# Patient Record
Sex: FEMALE | Race: WHITE | NOT HISPANIC OR LATINO | Employment: OTHER | URBAN - METROPOLITAN AREA
[De-identification: names, ages, dates, MRNs, and addresses within clinical notes are randomized per-mention and may not be internally consistent; named-entity substitution may affect disease eponyms.]

---

## 2017-03-08 ENCOUNTER — PATIENT MESSAGE (OUTPATIENT)
Dept: OBSTETRICS AND GYNECOLOGY | Facility: CLINIC | Age: 57
End: 2017-03-08

## 2017-03-08 ENCOUNTER — TELEPHONE (OUTPATIENT)
Dept: OBSTETRICS AND GYNECOLOGY | Facility: CLINIC | Age: 57
End: 2017-03-08

## 2017-03-08 DIAGNOSIS — Z12.31 VISIT FOR SCREENING MAMMOGRAM: Primary | ICD-10-CM

## 2017-03-08 NOTE — TELEPHONE ENCOUNTER
Orders placed! Per pt request    Hi Dr. Riggs!  I have an appointment for my annual physical on March 17, 2017.  I got a letter that I need a referral for a Mammogram.  Can you please provide a referral?     Thanks!   Mariah Rice

## 2017-03-17 ENCOUNTER — OFFICE VISIT (OUTPATIENT)
Dept: OBSTETRICS AND GYNECOLOGY | Facility: CLINIC | Age: 57
End: 2017-03-17
Payer: COMMERCIAL

## 2017-03-17 VITALS
SYSTOLIC BLOOD PRESSURE: 102 MMHG | HEIGHT: 64 IN | WEIGHT: 150.13 LBS | BODY MASS INDEX: 25.63 KG/M2 | DIASTOLIC BLOOD PRESSURE: 64 MMHG

## 2017-03-17 DIAGNOSIS — Z01.419 ENCOUNTER FOR GYNECOLOGICAL EXAMINATION WITHOUT ABNORMAL FINDING: Primary | ICD-10-CM

## 2017-03-17 DIAGNOSIS — R10.2 PELVIC CRAMPING: ICD-10-CM

## 2017-03-17 DIAGNOSIS — N95.1 PERIMENOPAUSAL: ICD-10-CM

## 2017-03-17 LAB
BILIRUB UR QL STRIP: NEGATIVE
CLARITY UR REFRACT.AUTO: CLEAR
COLOR UR AUTO: YELLOW
GLUCOSE UR QL STRIP: NEGATIVE
HGB UR QL STRIP: NEGATIVE
KETONES UR QL STRIP: NEGATIVE
LEUKOCYTE ESTERASE UR QL STRIP: NEGATIVE
MICROSCOPIC COMMENT: NORMAL
NITRITE UR QL STRIP: NEGATIVE
PH UR STRIP: 5 [PH] (ref 5–8)
PROT UR QL STRIP: NEGATIVE
RBC #/AREA URNS AUTO: 0 /HPF (ref 0–4)
SP GR UR STRIP: 1.03 (ref 1–1.03)
SQUAMOUS #/AREA URNS AUTO: 0 /HPF
URN SPEC COLLECT METH UR: NORMAL
UROBILINOGEN UR STRIP-ACNC: NEGATIVE EU/DL
WBC #/AREA URNS AUTO: 0 /HPF (ref 0–5)

## 2017-03-17 PROCEDURE — 99999 PR PBB SHADOW E&M-EST. PATIENT-LVL III: CPT | Mod: PBBFAC,,, | Performed by: OBSTETRICS & GYNECOLOGY

## 2017-03-17 PROCEDURE — 87086 URINE CULTURE/COLONY COUNT: CPT

## 2017-03-17 PROCEDURE — 99396 PREV VISIT EST AGE 40-64: CPT | Mod: S$GLB,,, | Performed by: OBSTETRICS & GYNECOLOGY

## 2017-03-17 PROCEDURE — 81001 URINALYSIS AUTO W/SCOPE: CPT

## 2017-03-17 NOTE — MR AVS SNAPSHOT
[facility] - OB/GYN 5th Floor  [address]  [facility] [address]  Phone: [phone]                  Marilee Colmenares   3/17/2017 2:15 PM   Office Visit    Description:  Female : 1960   Provider:  Roseline Riggs MD   Department:  [facility] - OB/GYN 5th Floor           Reason for Visit     Well Woman                To Do List           Future Appointments        Provider Department Dept Phone    3/20/2017 9:40 AM [facility] [id number] SCREEN INT MED [facility] [phone]      Goals (5 Years of Data)     None      [facility]     [facility] Care [facility] -  Assistance  Registered nurses in the [facility] provide clinical advisement, health education, appointment booking, and other advisory services.  Call for this free service at [phone].             Medications           Message regarding Medications     Verify the changes and/or additions to your medication regime listed below are the same as discussed with your clinician today.  If any of these changes or additions are incorrect, please notify your healthcare provider.             Verify that the below list of medications is an accurate representation of the medications you are currently taking.  If none reported, the list may be blank. If incorrect, please contact your healthcare provider. Carry this list with you in case of emergency.           Current Medications     albuterol 90 mcg/actuation inhaler Inhale 2 puffs into the lungs every 6 (six) hours as needed for Wheezing.    aspirin (ECOTRIN) 325 MG EC tablet Take 1 tablet (325 mg total) by mouth once daily.    atorvastatin (LIPITOR) 20 MG tablet TAKE 1 TABLET (20 MG TOTAL) BY MOUTH ONCE DAILY.    desloratadine (CLARINEX) 5 mg tablet TAKE 1 TABLET (5 MG TOTAL) BY MOUTH ONCE DAILY.    FLUARIX QUAD 7191-7052, PF, 60 mcg (15 mcg x 4)/0.5 mL Syrg TO BE ADMINISTERED BY PHARMACIST FOR IMMUNIZATION    guaifenesin (MUCINEX) 600 mg 12 hr tablet  "Take 1,200 mg by mouth 2 (two) times daily.    montelukast (SINGULAIR) 10 mg tablet TAKE 1 TABLET (10 MG TOTAL) BY MOUTH EVERY EVENING.    ranitidine (ZANTAC) 150 MG capsule Take 150 mg by mouth 2 (two) times daily.             Clinical Reference Information           Your Vitals Were     BP Height Weight Last Period BMI    102/64 5' 4" (1.626 m) 68.1 kg (150 lb 2.1 oz) 04/21/2012 25.77 kg/m2      Blood Pressure          Most Recent Value    BP  102/64      Allergies as of 3/17/2017     Tetracyclines    Ceclor [Cefaclor]    Keflex [Cephalexin]      Immunizations Administered on Date of Encounter - 3/17/2017     None      Language Assistance Services     ATTENTION: Language assistance services are available, free of charge. Please call [phone].      ATENCIÓN: Si jacqueline art, tiene a palomares disposición servicios gratuitos de asistencia lingüística. Llame al [phone].     DONNIE Ý: N?u b?n nói Ti?ng Vi?t, có các d?ch v? h? tr? ngôn ng? mi?n phí dành cho b?n. G?i s? [phone].         [facility] - OB[facility]N 5th Floor complies with applicable Federal civil rights laws and does not discriminate on the basis of race, color, national origin, age, disability, or sex.        "

## 2017-03-17 NOTE — PROGRESS NOTES
CC: Well woman exam    Marilee Colmenares is a 56 y.o. female  presents for a well woman exam.  LMP: Patient's last menstrual period was 2012..    She had an episode where her lower pelvic area was cramping as if she was going to have a cycle.  Has a cycle about once a year. NO other   GYN concerns    Past Medical History:   Diagnosis Date    Abnormal Pap smear     Asthma     DJD (degenerative joint disease) of knee     GERD (gastroesophageal reflux disease)     Stroke      Past Surgical History:   Procedure Laterality Date    BREAST SURGERY   and     right side and left side/begin fibroid tumor    CERVIX SURGERY      colpo 2002      skin cancers      TONSILLECTOMY      childhood    TONSILLECTOMY, ADENOIDECTOMY      childhood     Social History     Social History    Marital status:      Spouse name: N/A    Number of children: N/A    Years of education: N/A     Occupational History          Social History Main Topics    Smoking status: Former Smoker    Smokeless tobacco: Never Used      Comment: quit >28 years ago    Alcohol use 6.0 oz/week     10 Glasses of wine per week      Comment: has at least one drink daily/10-12 per week    Drug use: No    Sexual activity: Yes     Partners: Male     Birth control/ protection: Post-menopausal     Other Topics Concern    None     Social History Narrative    Retired from     Now working on [employer]         Family History   Problem Relation Age of Onset    Stroke Mother     Stroke Father     Diabetes Maternal Aunt     Cancer Maternal Aunt      thyroid cancer    Melanoma Maternal Aunt     Heart disease Maternal Uncle     Heart disease Paternal Uncle     Cancer Paternal Uncle      prostate cancer    Heart disease Maternal Grandmother     Diabetes Maternal Grandmother     Heart disease Maternal Grandfather     Heart disease Paternal Grandmother     Heart disease  "Paternal Grandfather     Cancer Paternal Grandfather      bone cancer    Cancer Paternal Aunt      stomach cancer    Breast cancer Neg Hx     Colon cancer Neg Hx     Ovarian cancer Neg Hx      OB History      Para Term  AB TAB SAB Ectopic Multiple Living    1 1                  /64  Ht 5' 4" (1.626 m)  Wt 68.1 kg (150 lb 2.1 oz)  LMP 2012  BMI 25.77 kg/m2      ROS:    ROS:  GENERAL: Denies weight gain or weight loss. Feeling well overall.   SKIN: Denies rash or lesions.   HEAD: Denies head injury or headache.   NODES: Denies enlarged lymph nodes.   CHEST: Denies chest pain or shortness of breath.   CARDIOVASCULAR: Denies palpitations or left sided chest pain.   ABDOMEN: No abdominal pain, constipation, diarrhea, nausea, vomiting or rectal bleeding.   URINARY: No frequency, dysuria, hematuria, or burning on urination.  REPRODUCTIVE: See HPI.   BREASTS: The patient performs breast self-examination and denies pain, lumps, or nipple discharge.   HEMATOLOGIC: No easy bruisability or excessive bleeding.   MUSCULOSKELETAL: Denies joint pain or swelling.   NEUROLOGIC: Denies syncope or weakness.   PSYCHIATRIC: Denies depression, anxiety or mood swings.    PHYSICAL EXAM:    APPEARANCE: Well nourished, well developed, in no acute distress.  AFFECT: WNL, alert and oriented x 3  SKIN: No acne or hirsutism  NECK: Neck symmetric without masses or thyromegaly  NODES: No inguinal, cervical, axillary, or femoral lymph node enlargement  CHEST: Good respiratory effect  ABDOMEN: Soft.  No tenderness or masses.  No hepatosplenomegaly.  No hernias.  BREASTS: Symmetrical, no skin changes or visible lesions.  No palpable masses, nipple discharge bilaterally.  PELVIC: Normal external genitalia without lesions.  Normal hair distribution.  Adequate perineal body, normal urethral meatus.  Vagina moist and well rugated without lesions or discharge.  Cervix pink, without lesions, discharge or tenderness.  No " significant cystocele or rectocele.  Bimanual exam shows uterus to be normal size, regular, mobile and nontender.  Adnexa without masses or tenderness.    RECTAL: Rectovaginal exam confirms above with normal sphincter tone, no masses.  EXTREMITIES: No edema.      ICD-10-CM ICD-9-CM    1. Encounter for gynecological examination without abnormal finding Z01.419 V72.31    2. Pelvic cramping R10.2 625.9 Urine culture      Urinalysis   3. Perimenopausal N95.1 627.2          Patient was counseled today on A.C.S. Pap guidelines and recommendations for yearly pelvic exams, mammograms and monthly self breast exams; to see her PCP for other health maintenance.     F/U with me if she has another episode of pelvic pain- CONSIDER pelvic US.   Precautions given

## 2017-03-19 LAB — BACTERIA UR CULT: NO GROWTH

## 2017-03-20 ENCOUNTER — HOSPITAL ENCOUNTER (OUTPATIENT)
Dept: RADIOLOGY | Facility: HOSPITAL | Age: 57
Discharge: HOME | End: 2017-03-20
Attending: OBSTETRICS & GYNECOLOGY
Payer: COMMERCIAL

## 2017-03-20 DIAGNOSIS — Z12.31 VISIT FOR SCREENING MAMMOGRAM: ICD-10-CM

## 2017-03-20 PROCEDURE — 77067 SCR MAMMO BI INCL CAD: CPT | Mod: 26,,, | Performed by: RADIOLOGY

## 2017-03-20 PROCEDURE — 77067 SCR MAMMO BI INCL CAD: CPT | Mod: TC

## 2017-08-10 RX ORDER — DESLORATADINE 5 MG/1
TABLET ORAL
Qty: 90 TABLET | Refills: 2 | Status: SHIPPED | OUTPATIENT
Start: 2017-08-10 | End: 2018-05-29 | Stop reason: SDUPTHER

## 2017-09-07 RX ORDER — ATORVASTATIN CALCIUM 20 MG/1
TABLET, FILM COATED ORAL
Qty: 90 TABLET | Refills: 3 | OUTPATIENT
Start: 2017-09-07

## 2017-09-12 ENCOUNTER — OFFICE VISIT (OUTPATIENT)
Dept: INTERNAL MEDICINE | Facility: CLINIC | Age: 57
End: 2017-09-12
Payer: COMMERCIAL

## 2017-09-12 VITALS
WEIGHT: 153.88 LBS | BODY MASS INDEX: 26.27 KG/M2 | HEIGHT: 64 IN | RESPIRATION RATE: 16 BRPM | DIASTOLIC BLOOD PRESSURE: 53 MMHG | SYSTOLIC BLOOD PRESSURE: 98 MMHG | HEART RATE: 69 BPM | TEMPERATURE: 98 F

## 2017-09-12 DIAGNOSIS — G47.62 NOCTURNAL LEG CRAMPS: ICD-10-CM

## 2017-09-12 DIAGNOSIS — Z00.00 ANNUAL PHYSICAL EXAM: Primary | ICD-10-CM

## 2017-09-12 DIAGNOSIS — E01.0 THYROMEGALY: ICD-10-CM

## 2017-09-12 DIAGNOSIS — E78.5 DYSLIPIDEMIA: ICD-10-CM

## 2017-09-12 DIAGNOSIS — I63.9 COMPLETED STROKE: ICD-10-CM

## 2017-09-12 PROCEDURE — 99396 PREV VISIT EST AGE 40-64: CPT | Mod: S$GLB,,, | Performed by: INTERNAL MEDICINE

## 2017-09-12 PROCEDURE — 99999 PR PBB SHADOW E&M-EST. PATIENT-LVL III: CPT | Mod: PBBFAC,,, | Performed by: INTERNAL MEDICINE

## 2017-09-12 RX ORDER — ATORVASTATIN CALCIUM 20 MG/1
20 TABLET, FILM COATED ORAL DAILY
Qty: 90 TABLET | Refills: 3 | Status: SHIPPED | OUTPATIENT
Start: 2017-09-12 | End: 2018-08-30 | Stop reason: SDUPTHER

## 2017-09-14 ENCOUNTER — LAB VISIT (OUTPATIENT)
Dept: LAB | Facility: HOSPITAL | Age: 57
End: 2017-09-14
Attending: INTERNAL MEDICINE
Payer: COMMERCIAL

## 2017-09-14 DIAGNOSIS — E78.5 DYSLIPIDEMIA: ICD-10-CM

## 2017-09-14 DIAGNOSIS — I63.9 COMPLETED STROKE: ICD-10-CM

## 2017-09-14 DIAGNOSIS — Z00.00 ANNUAL PHYSICAL EXAM: ICD-10-CM

## 2017-09-14 DIAGNOSIS — E01.0 THYROMEGALY: ICD-10-CM

## 2017-09-14 LAB
ALBUMIN SERPL BCP-MCNC: 3.9 G/DL
ALP SERPL-CCNC: 72 U/L
ALT SERPL W/O P-5'-P-CCNC: 21 U/L
ANION GAP SERPL CALC-SCNC: 10 MMOL/L
AST SERPL-CCNC: 28 U/L
BASOPHILS # BLD AUTO: 0.05 K/UL
BASOPHILS NFR BLD: 1.1 %
BILIRUB SERPL-MCNC: 0.8 MG/DL
BUN SERPL-MCNC: 15 MG/DL
CALCIUM SERPL-MCNC: 9.4 MG/DL
CHLORIDE SERPL-SCNC: 106 MMOL/L
CHOLEST SERPL-MCNC: 169 MG/DL
CHOLEST/HDLC SERPL: 2.2 {RATIO}
CK SERPL-CCNC: 85 U/L
CO2 SERPL-SCNC: 25 MMOL/L
CREAT SERPL-MCNC: 0.8 MG/DL
DIFFERENTIAL METHOD: ABNORMAL
EOSINOPHIL # BLD AUTO: 0.3 K/UL
EOSINOPHIL NFR BLD: 6.8 %
ERYTHROCYTE [DISTWIDTH] IN BLOOD BY AUTOMATED COUNT: 13.1 %
EST. GFR  (AFRICAN AMERICAN): >60 ML/MIN/1.73 M^2
EST. GFR  (NON AFRICAN AMERICAN): >60 ML/MIN/1.73 M^2
GLUCOSE SERPL-MCNC: 94 MG/DL
HCT VFR BLD AUTO: 45.3 %
HDLC SERPL-MCNC: 77 MG/DL
HDLC SERPL: 45.6 %
HGB BLD-MCNC: 15.1 G/DL
LDLC SERPL CALC-MCNC: 79.4 MG/DL
LYMPHOCYTES # BLD AUTO: 1.8 K/UL
LYMPHOCYTES NFR BLD: 40.4 %
MCH RBC QN AUTO: 32.4 PG
MCHC RBC AUTO-ENTMCNC: 33.3 G/DL
MCV RBC AUTO: 97 FL
MONOCYTES # BLD AUTO: 0.4 K/UL
MONOCYTES NFR BLD: 10 %
NEUTROPHILS # BLD AUTO: 1.8 K/UL
NEUTROPHILS NFR BLD: 41.5 %
NONHDLC SERPL-MCNC: 92 MG/DL
PLATELET # BLD AUTO: 229 K/UL
PMV BLD AUTO: 10.2 FL
POTASSIUM SERPL-SCNC: 4.4 MMOL/L
PROT SERPL-MCNC: 6.9 G/DL
RBC # BLD AUTO: 4.66 M/UL
SODIUM SERPL-SCNC: 141 MMOL/L
T4 FREE SERPL-MCNC: 0.82 NG/DL
TRIGL SERPL-MCNC: 63 MG/DL
TSH SERPL DL<=0.005 MIU/L-ACNC: 4.38 UIU/ML
WBC # BLD AUTO: 4.38 K/UL

## 2017-09-14 PROCEDURE — 82550 ASSAY OF CK (CPK): CPT

## 2017-09-14 PROCEDURE — 36415 COLL VENOUS BLD VENIPUNCTURE: CPT | Mod: PO

## 2017-09-14 PROCEDURE — 80061 LIPID PANEL: CPT

## 2017-09-14 PROCEDURE — 80053 COMPREHEN METABOLIC PANEL: CPT

## 2017-09-14 PROCEDURE — 84439 ASSAY OF FREE THYROXINE: CPT

## 2017-09-14 PROCEDURE — 84443 ASSAY THYROID STIM HORMONE: CPT

## 2017-09-14 PROCEDURE — 85025 COMPLETE CBC W/AUTO DIFF WBC: CPT

## 2017-09-18 NOTE — PROGRESS NOTES
Subjective:       Patient ID: Marilee Colmenares is a 57 y.o. female.    Chief Complaint: Annual Exam  HISTORY OF PRESENT ILLNESS:  A 57-year-old white female patient who had seen Dr. Parter but she is currently left the clinic.  She is coming in for an annual   review.  The patient had lab work done at the time of this exam, urinalysis was   normal.  Thyroid function normal.  CBC normal, lipids normal, chemistries   normal.  Her TSH is 4.38.  CPK is normal.  T4 is normal.  The patient sees Dr. Dexter for allergies,,  gynecologist per .  She sees skin doctor Dr. Parikh and   used to see Dr. Vizcaino for Neurology, but is no longer seeing those doctors   since they have left.  She had a stroke involving the right hand in 2015.  She   does have a family history of thyroid disease in her aunt who had cancer, and    sister who had hyperthyroidism.  The only complaint she has is left leg cramps,   mostly at night.  She does not do much in the way of physical activity.  The   patient has no other complaints.    PHYSICAL EXAMINATION:  VITAL SIGNS:  Normal.  SKIN:  Fair and healthy.  HEENT:  Clear.  NECK:  Shows no stiffness, adenopathy or thyroid enlargement.  CHEST:  Clear to percussion and auscultation.  HEART:  There is no murmur or gallop.  ABDOMEN:  Nontender without any organomegaly.  EXTREMITIES:  Show normal muscles, joints and pulses.  She has no tenderness in   her legs currently.  She has no nodularity to the muscles.  She has good pulses.    No edema.  NEUROLOGIC:  Appears normal including relative to right hand function.    The patient did have an enlarged right side of her thyroid.  On the exam,   because of her cramps, I sent her for CPK and that was normal then she was sent   for ultrasound of her thyroid and that is pending.    IMPRESSION:  1.  Status post stroke in 2015 involving the right hand with good recovery.  2.  Right thyromegaly with an elevated TSH.  I have told although that likely   will put  her on a small dose of thyroid just based on her thyroid being   enlarged, but under these circumstances that she does not have any abnormality   on her scan, we will put her on a dose that point of 0.05.  3.  Leg cramps.  She is concerned that might be from her statin medication,   which is a 20 mg dose of Lipitor.  She wanted to know if we could reduce her   dose of Lipitor based on her cholesterol, which is 169 and I will likely tell   her that it is probably not advisable and that her leg cramps are not from the   medication most likely.  I have told her that already, telling her that mostly   it is with activity with the statins and instead to try magnesium tablet   over-the-counter at night, daily as needed for the cramping.  4.  History of nasal allergies.  I will be her PCP if she wishes.  Otherwise, we   will follow up on her thyroid and the discussion about her Lipitor in about   three weeks.      BREANN/BISI  dd: 09/17/2017 22:25:46 (CDT)  td: 09/18/2017 02:01:59 (CDT)  Doc ID   #[id number]  Job ID #[id number]    CC:     [facility] [id number]  [facility] [place]  Review of Systems   Constitutional: Negative.  Negative for activity change and unexpected weight change.   HENT: Positive for rhinorrhea. Negative for congestion, hearing loss, sinus pressure, sneezing, sore throat, trouble swallowing and voice change.    Eyes: Positive for discharge and visual disturbance.   Respiratory: Positive for wheezing. Negative for cough, chest tightness and shortness of breath.    Cardiovascular: Negative.  Negative for chest pain, palpitations and leg swelling.   Gastrointestinal: Negative.  Negative for blood in stool, constipation, diarrhea and vomiting.   Endocrine: Negative for polydipsia and polyuria.   Genitourinary: Negative for difficulty urinating, dysuria, flank pain, frequency, hematuria, menstrual problem, pelvic pain, urgency, vaginal bleeding and vaginal discharge.   Musculoskeletal: Positive for arthralgias and myalgias. Negative for joint  swelling, neck pain and neck stiffness.   Skin: Negative.    Neurological: Negative.  Negative for dizziness, seizures, weakness, light-headedness, numbness and headaches.   Psychiatric/Behavioral: Negative for agitation, behavioral problems, confusion, dysphoric mood and sleep disturbance. The patient is not nervous/anxious.        Objective:      Physical Exam   Constitutional: She is oriented to person, place, and time. She appears well-developed and well-nourished.   Eyes: EOM are normal. Pupils are equal, round, and reactive to light.   Neck: Normal range of motion. Neck supple. No JVD present. Thyromegaly present.   Cardiovascular: Normal rate, regular rhythm, normal heart sounds and intact distal pulses.  Exam reveals no gallop.    No murmur heard.  Pulmonary/Chest: Breath sounds normal. She has no wheezes. She has no rales.   Abdominal: Soft. Bowel sounds are normal. She exhibits no mass. There is no tenderness.   Musculoskeletal: Normal range of motion.   Lymphadenopathy:     She has no cervical adenopathy.   Neurological: She is alert and oriented to person, place, and time. She has normal reflexes. No cranial nerve deficit.   Skin: No rash noted. No erythema.   Psychiatric: She has a normal mood and affect. Judgment normal.       Assessment:       1. Annual physical exam    2. Completed stroke    3. Dyslipidemia    4. Thyromegaly    5. Nocturnal leg cramps        Plan:

## 2017-09-19 ENCOUNTER — HOSPITAL ENCOUNTER (OUTPATIENT)
Dept: RADIOLOGY | Facility: HOSPITAL | Age: 57
Discharge: HOME | End: 2017-09-19
Attending: INTERNAL MEDICINE
Payer: COMMERCIAL

## 2017-09-19 DIAGNOSIS — E01.0 THYROMEGALY: ICD-10-CM

## 2017-09-19 PROCEDURE — 76536 US EXAM OF HEAD AND NECK: CPT | Mod: TC

## 2017-09-19 PROCEDURE — 76536 US EXAM OF HEAD AND NECK: CPT | Mod: 26,,, | Performed by: RADIOLOGY

## 2017-09-25 ENCOUNTER — HOSPITAL ENCOUNTER (EMERGENCY)
Facility: HOSPITAL | Age: 57
Discharge: HOME | End: 2017-09-25
Attending: EMERGENCY MEDICINE | Admitting: ORTHOPAEDIC SURGERY
Payer: COMMERCIAL

## 2017-09-25 VITALS
RESPIRATION RATE: 20 BRPM | OXYGEN SATURATION: 98 % | DIASTOLIC BLOOD PRESSURE: 73 MMHG | BODY MASS INDEX: 25.61 KG/M2 | HEIGHT: 64 IN | TEMPERATURE: 98 F | WEIGHT: 150 LBS | HEART RATE: 67 BPM | SYSTOLIC BLOOD PRESSURE: 140 MMHG
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
## 2017-09-25 DIAGNOSIS — S52.532A CLOSED COLLES' FRACTURE OF LEFT RADIUS, INITIAL ENCOUNTER: Primary | ICD-10-CM
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
## 2017-09-25 DIAGNOSIS — T14.8XXA FRACTURE: ICD-10-CM
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
## 2017-09-25 DIAGNOSIS — S52.592A OTHER CLOSED FRACTURE OF DISTAL END OF LEFT RADIUS, INITIAL ENCOUNTER: ICD-10-CM

## 2017-09-25 LAB
ANION GAP SERPL CALC-SCNC: 10 MMOL/L
BASOPHILS # BLD AUTO: 0.04 K/UL
BASOPHILS NFR BLD: 0.5 %
BUN SERPL-MCNC: 20 MG/DL
CALCIUM SERPL-MCNC: 10 MG/DL
CHLORIDE SERPL-SCNC: 107 MMOL/L
CO2 SERPL-SCNC: 24 MMOL/L
CREAT SERPL-MCNC: 0.8 MG/DL
DIFFERENTIAL METHOD: ABNORMAL
EOSINOPHIL # BLD AUTO: 0.2 K/UL
EOSINOPHIL NFR BLD: 2.9 %
ERYTHROCYTE [DISTWIDTH] IN BLOOD BY AUTOMATED COUNT: 13.4 %
EST. GFR  (AFRICAN AMERICAN): >60 ML/MIN/1.73 M^2
EST. GFR  (NON AFRICAN AMERICAN): >60 ML/MIN/1.73 M^2
GLUCOSE SERPL-MCNC: 120 MG/DL
HCT VFR BLD AUTO: 44.4 %
HGB BLD-MCNC: 15.5 G/DL
INR PPP: 1
LYMPHOCYTES # BLD AUTO: 1.1 K/UL
LYMPHOCYTES NFR BLD: 15.4 %
MCH RBC QN AUTO: 32.4 PG
MCHC RBC AUTO-ENTMCNC: 34.9 G/DL
MCV RBC AUTO: 93 FL
MONOCYTES # BLD AUTO: 0.5 K/UL
MONOCYTES NFR BLD: 6.6 %
NEUTROPHILS # BLD AUTO: 5.4 K/UL
NEUTROPHILS NFR BLD: 74.5 %
PLATELET # BLD AUTO: 219 K/UL
PMV BLD AUTO: 10.1 FL
POTASSIUM SERPL-SCNC: 4.2 MMOL/L
PROTHROMBIN TIME: 11 SEC
RBC # BLD AUTO: 4.78 M/UL
SODIUM SERPL-SCNC: 141 MMOL/L
WBC # BLD AUTO: 7.28 K/UL

## 2017-09-25 PROCEDURE — 63600175 PHARM REV CODE 636 W HCPCS: Performed by: EMERGENCY MEDICINE

## 2017-09-25 PROCEDURE — 96375 TX/PRO/DX INJ NEW DRUG ADDON: CPT

## 2017-09-25 PROCEDURE — 93005 ELECTROCARDIOGRAM TRACING: CPT

## 2017-09-25 PROCEDURE — 85025 COMPLETE CBC W/AUTO DIFF WBC: CPT

## 2017-09-25 PROCEDURE — 99285 EMERGENCY DEPT VISIT HI MDM: CPT | Mod: 25

## 2017-09-25 PROCEDURE — 63600175 PHARM REV CODE 636 W HCPCS: Performed by: ORTHOPAEDIC SURGERY

## 2017-09-25 PROCEDURE — 93010 ELECTROCARDIOGRAM REPORT: CPT | Mod: ,,, | Performed by: INTERNAL MEDICINE

## 2017-09-25 PROCEDURE — 25605 CLTX DST RDL FX/EPHYS SEP W/: CPT | Mod: LT

## 2017-09-25 PROCEDURE — 96374 THER/PROPH/DIAG INJ IV PUSH: CPT

## 2017-09-25 PROCEDURE — 99285 EMERGENCY DEPT VISIT HI MDM: CPT | Mod: ,,, | Performed by: EMERGENCY MEDICINE

## 2017-09-25 PROCEDURE — 85610 PROTHROMBIN TIME: CPT

## 2017-09-25 PROCEDURE — 25000003 PHARM REV CODE 250: Performed by: ORTHOPAEDIC SURGERY

## 2017-09-25 PROCEDURE — 80048 BASIC METABOLIC PNL TOTAL CA: CPT

## 2017-09-25 RX ORDER — MORPHINE SULFATE 4 MG/ML
4 INJECTION, SOLUTION INTRAMUSCULAR; INTRAVENOUS
Status: COMPLETED | OUTPATIENT
Start: 2017-09-25 | End: 2017-09-25

## 2017-09-25 RX ORDER — LIDOCAINE HYDROCHLORIDE 10 MG/ML
20 INJECTION INFILTRATION; PERINEURAL ONCE
Status: COMPLETED | OUTPATIENT
Start: 2017-09-25 | End: 2017-09-25

## 2017-09-25 RX ORDER — MIDAZOLAM HYDROCHLORIDE 1 MG/ML
2 INJECTION INTRAMUSCULAR; INTRAVENOUS ONCE
Status: COMPLETED | OUTPATIENT
Start: 2017-09-25 | End: 2017-09-25

## 2017-09-25 RX ORDER — HYDROCODONE BITARTRATE AND ACETAMINOPHEN 5; 325 MG/1; MG/1
1 TABLET ORAL EVERY 4 HOURS PRN
Qty: 18 TABLET | Refills: 0 | Status: SHIPPED | OUTPATIENT
Start: 2017-09-25 | End: 2018-03-14

## 2017-09-25 RX ORDER — ONDANSETRON 2 MG/ML
4 INJECTION INTRAMUSCULAR; INTRAVENOUS
Status: COMPLETED | OUTPATIENT
Start: 2017-09-25 | End: 2017-09-25

## 2017-09-25 RX ADMIN — MORPHINE SULFATE 4 MG: 4 INJECTION INTRAVENOUS at 06:09

## 2017-09-25 RX ADMIN — MIDAZOLAM HYDROCHLORIDE 2 MG: 1 INJECTION, SOLUTION INTRAMUSCULAR; INTRAVENOUS at 07:09

## 2017-09-25 RX ADMIN — LIDOCAINE HYDROCHLORIDE 20 ML: 10 INJECTION, SOLUTION INFILTRATION; PERINEURAL at 07:09

## 2017-09-25 RX ADMIN — ONDANSETRON 4 MG: 2 INJECTION INTRAMUSCULAR; INTRAVENOUS at 06:09

## 2017-09-25 NOTE — ED PROVIDER NOTES
Encounter Date: 9/25/2017    SCRIBE #1 NOTE: I, Fred Duran, am scribing for, and in the presence of,  Dr. Brown . I have scribed the entire note.       History     Chief Complaint   Patient presents with    Fall     trip and fall, left wrist and hand deformity, gave 100 fentantyl given per ems.      Time patient was seen by the provider: 5:48 PM    The patient is a 57 y.o. Right hand dominant female with hx of: hyperlipidemia, prior stroke with no residual deficits presents with a complaint of left wrist pain. Pt states she was walking her dog and missed a step and fell on her outstretched left arm. Pt denies head trama, LOC, headache, chest pain, SOB, abdominal pain. No n/t.          The history is provided by the patient.     Review of patient's allergies indicates:   Allergen Reactions    Tetracyclines Dermatitis    Ceclor [cefaclor] Other (See Comments)     Canker sores    Keflex [cephalexin] Other (See Comments)     Canker sores     Past Medical History:   Diagnosis Date    Abnormal Pap smear     Asthma     DJD (degenerative joint disease) of knee     GERD (gastroesophageal reflux disease)     Stroke 2015     Past Surgical History:   Procedure Laterality Date    BREAST SURGERY  1990 and 2001    right side and left side/begin fibroid tumor    CERVIX SURGERY      colpo 2002      skin cancers      TONSILLECTOMY      childhood    TONSILLECTOMY, ADENOIDECTOMY      childhood     Family History   Problem Relation Age of Onset    Stroke Mother     Stroke Father     Diabetes Maternal Aunt     Cancer Maternal Aunt      thyroid cancer    Melanoma Maternal Aunt     Heart disease Maternal Uncle     Heart disease Paternal Uncle     Cancer Paternal Uncle      prostate cancer    Heart disease Maternal Grandmother     Diabetes Maternal Grandmother     Heart disease Maternal Grandfather     Heart disease Paternal Grandmother     Heart disease Paternal Grandfather     Cancer Paternal  Grandfather      bone cancer    Cancer Paternal Aunt      stomach cancer    Breast cancer Neg Hx     Colon cancer Neg Hx     Ovarian cancer Neg Hx      Social History   Substance Use Topics    Smoking status: Former Smoker    Smokeless tobacco: Never Used      Comment: quit >28 years ago    Alcohol use 6.0 oz/week     10 Glasses of wine per week      Comment: has at least one drink daily/10-12 per week     Review of Systems   Constitutional: Negative for fever.   HENT: Negative for sore throat.    Respiratory: Negative for shortness of breath.    Cardiovascular: Negative for chest pain.   Gastrointestinal: Negative for nausea.   Genitourinary: Negative for dysuria.   Musculoskeletal: Negative for back pain.        Pt endorses left wrist pain    Skin: Negative for rash.   Neurological: Negative for dizziness, weakness and headaches.   Hematological: Does not bruise/bleed easily.       Physical Exam     Initial Vitals [09/25/17 1622]   BP Pulse Resp Temp SpO2   130/69 65 20 98.2 °F (36.8 °C) 100 %      MAP       89.33         Physical Exam    Nursing note and vitals reviewed.  Constitutional: She appears well-developed and well-nourished. She is not diaphoretic. No distress.   Pt has a good radial pulse and a good cap refill    HENT:   Head: Normocephalic and atraumatic.   Eyes: Pupils are equal, round, and reactive to light.   Neck: Normal range of motion.   Cardiovascular: Normal rate, regular rhythm, normal heart sounds and intact distal pulses.   No murmur heard.  Pulmonary/Chest: Breath sounds normal. No respiratory distress.   Abdominal: Soft. She exhibits no distension. There is no tenderness.   Musculoskeletal:   left wrist deformity  neurovascularly intact   Neurological: She is alert and oriented to person, place, and time.   Pt has normal sensation to light touch distally    Skin: Skin is warm and dry.         ED Course   Procedures  Labs Reviewed   CBC W/ AUTO DIFFERENTIAL   BASIC METABOLIC PANEL    PROTIME-INR     EKG Readings: (Independently Interpreted)   Sinus rhythm rate 62, no acute ischemic changes       X-Rays:   Independently Interpreted Readings:   Other Readings:  XR Lt wrist- comminuted distal radius fx with dorsal ang, involving joint  XR forearm- no additional acute fx  XR elbow-  No acute fracture    Medical Decision Making:   Initial Assessment:   58 y/o Rt hand dominant F with lt wrist pain after fall  Obvious deformity of Lt wrist, no e/o break in skin, neurovascularly intact  XR wrist to evaluate fx as well as XR forearm and elbow  Preop labs sent as well as ecg  Ortho consulted for reduction  Post reduction XR ordered    Clinical Tests:   Lab Tests: Ordered and Reviewed  Radiological Study: Ordered and Reviewed            Scribe Attestation:   Scribe #1: I performed the above scribed service and the documentation accurately describes the services I performed. I attest to the accuracy of the note.    Attending Attestation:           Physician Attestation for Scribe:  Physician Attestation Statement for Scribe #1: I, Dr. Brown, reviewed documentation, as scribed by Fred Duran  in my presence, and it is both accurate and complete.                 ED Course      Clinical Impression:   Diagnoses of Fall, Fall, and Fall were pertinent to this visit.    Disposition:   Disposition: Discharged  Condition: Stable                        Lizette Brown MD  09/25/17 0403

## 2017-09-25 NOTE — ED NOTES
Family member came to triage, wanting ice pack , given to pt and in 10/10 pain, deformity noted, charge nurse aware changed to irma 2

## 2017-09-25 NOTE — ED TRIAGE NOTES
Marilee Colmenares, a 57 y.o. female presents to the ED w/ a c/c of a left wrist deformity. Pt states she was walking dog when the dog tripped her.  Denies hitting head/LOC. Given 100mcg of fentanyl via EMS. ABCs intact.       Chief Complaint   Patient presents with    Fall     trip and fall, left wrist and hand deformity, gave 100 fentantyl given per ems.      Review of patient's allergies indicates:   Allergen Reactions    Tetracyclines Dermatitis    Ceclor [cefaclor] Other (See Comments)     Canker sores    Keflex [cephalexin] Other (See Comments)     Canker sores     Past Medical History:   Diagnosis Date    Abnormal Pap smear     Asthma     DJD (degenerative joint disease) of knee     GERD (gastroesophageal reflux disease)     Stroke 2015

## 2017-09-25 NOTE — ED NOTES
LOC: The patient is awake, alert and aware of environment with an appropriate affect, the patient is oriented x 3 and speaking appropriately.  APPEARANCE: Patient resting comfortably and in no acute distress, patient is clean and well groomed, patient's clothing is properly fastened.  SKIN: The skin is warm and dry, patient has normal skin turgor and moist mucus membranes, skin intact, no breakdown or brusing noted.  MUSKULOSKELETAL: Pt has hand and wrist deformity of the left wrist. Cap refill <3.   RESPIRATORY: Airway is open and patent, respirations are spontaneous, patient has a normal effort and rate. Breath sounds are clear and equal bilaterally.  CARDIAC: Normal heart sounds. No peripheral edema.  ABDOMEN: Soft and non tender to palpation, no distention noted. Bowel sounds present.  NEURO: No neuro deficits, hand grasp equal, no drift noted, no facial droop noted. Speech is clear.

## 2017-09-26 ENCOUNTER — TELEPHONE (OUTPATIENT)
Dept: ORTHOPEDICS | Facility: CLINIC | Age: 57
End: 2017-09-26

## 2017-09-26 DIAGNOSIS — S52.502A DISTAL RADIUS FRACTURE, LEFT: ICD-10-CM

## 2017-09-26 DIAGNOSIS — S62.102A WRIST FRACTURE, LEFT, CLOSED, INITIAL ENCOUNTER: Primary | ICD-10-CM

## 2017-09-26 NOTE — CONSULTS
Orthopaedic Surgery  Consult Note    Marileebandar Colmenares  09/25/2017    HPI:    CC: left wrist pain    Patient is a 57 y.o. female with PMHx significant for asthma, OA of the knee, GERD, and stroke presents with left wrist pain after a fall earlier today. She was walking her dog when the dog pulled at the leash and she fell forward onto her outstretched left hand. She did not hit her head or lose consciousness. She denies pain in any other locations. She does not have numbness or tingling.     Past Medical History:   Diagnosis Date    Abnormal Pap smear     Asthma     DJD (degenerative joint disease) of knee     GERD (gastroesophageal reflux disease)     Stroke 2015     Past Surgical History:   Procedure Laterality Date    BREAST SURGERY  1990 and 2001    right side and left side/begin fibroid tumor    CERVIX SURGERY      colpo 2002      skin cancers      TONSILLECTOMY      childhood    TONSILLECTOMY, ADENOIDECTOMY      childhood     Family History   Problem Relation Age of Onset    Stroke Mother     Stroke Father     Diabetes Maternal Aunt     Cancer Maternal Aunt      thyroid cancer    Melanoma Maternal Aunt     Heart disease Maternal Uncle     Heart disease Paternal Uncle     Cancer Paternal Uncle      prostate cancer    Heart disease Maternal Grandmother     Diabetes Maternal Grandmother     Heart disease Maternal Grandfather     Heart disease Paternal Grandmother     Heart disease Paternal Grandfather     Cancer Paternal Grandfather      bone cancer    Cancer Paternal Aunt      stomach cancer    Breast cancer Neg Hx     Colon cancer Neg Hx     Ovarian cancer Neg Hx      Social History     Social History    Marital status:      Spouse name: N/A    Number of children: N/A    Years of education: N/A     Occupational History          Social History Main Topics    Smoking status: Former Smoker    Smokeless tobacco: Never Used      Comment: quit >28 years  ago    Alcohol use 6.0 oz/week     10 Glasses of wine per week      Comment: has at least one drink daily/10-12 per week    Drug use: No    Sexual activity: Yes     Partners: Male     Birth control/ protection: Post-menopausal     Other Topics Concern    Not on file     Social History Narrative    Retired from     Now working on novel writing and blogger         No current facility-administered medications on file prior to encounter.      Current Outpatient Prescriptions on File Prior to Encounter   Medication Sig    albuterol 90 mcg/actuation inhaler Inhale 2 puffs into the lungs every 6 (six) hours as needed for Wheezing.    aspirin (ECOTRIN) 325 MG EC tablet Take 1 tablet (325 mg total) by mouth once daily.    atorvastatin (LIPITOR) 20 MG tablet Take 1 tablet (20 mg total) by mouth once daily.    desloratadine (CLARINEX) 5 mg tablet TAKE 1 TABLET (5 MG TOTAL) BY MOUTH ONCE DAILY.    FLUARIX QUAD 9000-5041, PF, 60 mcg (15 mcg x 4)/0.5 mL Syrg TO BE ADMINISTERED BY PHARMACIST FOR IMMUNIZATION    guaifenesin (MUCINEX) 600 mg 12 hr tablet Take 1,200 mg by mouth 2 (two) times daily.    montelukast (SINGULAIR) 10 mg tablet TAKE 1 TABLET (10 MG TOTAL) BY MOUTH EVERY EVENING.    ranitidine (ZANTAC) 150 MG capsule Take 150 mg by mouth 2 (two) times daily.         Review of Systems:    Patient denies constitutional symptoms, cardiac symptoms, respiratory symptoms, GI symptoms, psychiatric symptoms, endocrine related symptoms.  The remainder of the musculoskeletal ROS is included in the HPI.    Physical Exam:    Temp:  [98.2 °F (36.8 °C)] 98.2 °F (36.8 °C)  Pulse:  [62-65] 62  Resp:  [20] 20  SpO2:  [99 %-100 %] 99 %  BP: (130-151)/(69-74) 148/74    PE:    AA&O x 4.  NAD  HEENT:  NCAT, sclera nonicteric  Lungs:  Respirations are equal and unlabored.  CV:  2+ bilateral upper and lower extremity pulses.  Skin:  Intact throughout.    MSK:  LUE: Skin intact, moderate swelling and obvious deformity  of the wrist; TTP around wrist; SILT throughout M/U/R nerve distributions; grossly motor intact AIN/PIN/R/U nerves; brisk cap refill, 2+ DR pulse    Diagnostic Results:  Radiographs of left wrist demonstrate dorsally displaced, comminuted distal radius fracture    A/P:  57 y.o. female with a closed left distal radius fracture  - Reduced and splinted in ED under hematoma block  - NWB to LUE, pt encouraged to keep extremity iced and elevated at all times  - Pt will need operative fixation of this fracture. I have explained the risks, benefits, and alternatives of the procedure in detail. The patient voices understanding and all questions have been answered. The patient agrees to proceed as planned. Pre-op workup in ED.  - F/u this week with Dr. Rojo for pre-op with plans for ORIF.    Procedure note:  After time out was performed and patient ID, side, and site were verified, the left wrist was sterilly prepped in the standard fashion. A 22-gauge needle was introduced into the fracture site without complication. 20cc of 1% lidocaine was then injected to the fracture site without difficulty. After adequate analgesia, the fracture was closed reduced under c-arm guidance and adequate reduction was obtained. A sugartong splint was applied and then post-reduction films were obtained which verified maintenance of the reduction. The patient tolerated the procedure well with no complications. Blood loss was minimal.    Rosanne Green MD PGY-2  Orthopaedic Surgery Resident

## 2017-09-26 NOTE — DISCHARGE INSTRUCTIONS
Tylenol of needed for pain.  Call Dr. Rojo tomorrow for an appointment as soon as possible.  Return to ED for numbness, tingling, worsening pain or any other concerns.

## 2017-09-26 NOTE — ED NOTES
Report received from Milvia RN. Care assumed. Pt resting comfortably and independently repositioned in stretcher with bed locked in lowest position for safety. NAD and patient states she feels a little better. Respirations even and unlabored and visible chest rise noted. Patient offered bathroom assistance and denies need at this time. Pt instructed to call if assistance is needed.. No needs at this time. Will continue to monitor. Call light within reach. Family at bedside.

## 2017-09-26 NOTE — TELEPHONE ENCOUNTER
----- Message from Kyleigh Henderson sent at 9/26/2017  1:03 PM CDT -----  Contact: pt  X_  1st Request  _  2nd Request  _  3rd Request    ---FST Request---    Who:BLOSSOM GOODRICH [[id number]]    Why: Patient states she has a fractured left wrist patient would like to be seen today... Please contact to further discuss and advise     What Number to Call Back: [phone]    When to Expect a call back: (Before the end of the day)   -- if call after 3:00 call back will be tomorrow.

## 2017-09-27 ENCOUNTER — ANESTHESIA EVENT (OUTPATIENT)
Dept: SURGERY | Facility: OTHER | Age: 57
End: 2017-09-27
Payer: COMMERCIAL

## 2017-09-27 ENCOUNTER — ANESTHESIA (OUTPATIENT)
Dept: SURGERY | Facility: OTHER | Age: 57
End: 2017-09-27
Payer: COMMERCIAL

## 2017-09-27 ENCOUNTER — HOSPITAL ENCOUNTER (OUTPATIENT)
Facility: OTHER | Age: 57
Discharge: HOME | End: 2017-09-27
Attending: ORTHOPAEDIC SURGERY | Admitting: ORTHOPAEDIC SURGERY
Payer: COMMERCIAL

## 2017-09-27 VITALS
BODY MASS INDEX: 25.61 KG/M2 | RESPIRATION RATE: 18 BRPM | WEIGHT: 150 LBS | OXYGEN SATURATION: 97 % | DIASTOLIC BLOOD PRESSURE: 64 MMHG | SYSTOLIC BLOOD PRESSURE: 128 MMHG | HEART RATE: 70 BPM | HEIGHT: 64 IN | TEMPERATURE: 98 F

## 2017-09-27 DIAGNOSIS — S52.502A DISTAL RADIUS FRACTURE, LEFT: ICD-10-CM

## 2017-09-27 DIAGNOSIS — S62.102A WRIST FRACTURE, LEFT, CLOSED, INITIAL ENCOUNTER: ICD-10-CM

## 2017-09-27 PROCEDURE — 25609 OPTX DST RD XART FX/EP SEP3+: CPT | Mod: LT,,, | Performed by: ORTHOPAEDIC SURGERY

## 2017-09-27 PROCEDURE — S0077 INJECTION, CLINDAMYCIN PHOSP: HCPCS | Performed by: ORTHOPAEDIC SURGERY

## 2017-09-27 PROCEDURE — C1769 GUIDE WIRE: HCPCS | Performed by: ORTHOPAEDIC SURGERY

## 2017-09-27 PROCEDURE — 71000015 HC POSTOP RECOV 1ST HR: Performed by: ORTHOPAEDIC SURGERY

## 2017-09-27 PROCEDURE — 25000003 PHARM REV CODE 250: Performed by: ORTHOPAEDIC SURGERY

## 2017-09-27 PROCEDURE — 63600175 PHARM REV CODE 636 W HCPCS: Performed by: ORTHOPAEDIC SURGERY

## 2017-09-27 PROCEDURE — C1713 ANCHOR/SCREW BN/BN,TIS/BN: HCPCS | Performed by: ORTHOPAEDIC SURGERY

## 2017-09-27 PROCEDURE — 27201423 OPTIME MED/SURG SUP & DEVICES STERILE SUPPLY: Performed by: ORTHOPAEDIC SURGERY

## 2017-09-27 PROCEDURE — 25000003 PHARM REV CODE 250: Performed by: ANESTHESIOLOGY

## 2017-09-27 PROCEDURE — 63600175 PHARM REV CODE 636 W HCPCS: Performed by: ANESTHESIOLOGY

## 2017-09-27 PROCEDURE — 37000008 HC ANESTHESIA 1ST 15 MINUTES: Performed by: ORTHOPAEDIC SURGERY

## 2017-09-27 PROCEDURE — 36000711: Performed by: ORTHOPAEDIC SURGERY

## 2017-09-27 PROCEDURE — 36000710: Performed by: ORTHOPAEDIC SURGERY

## 2017-09-27 PROCEDURE — 63600175 PHARM REV CODE 636 W HCPCS: Performed by: NURSE ANESTHETIST, CERTIFIED REGISTERED

## 2017-09-27 PROCEDURE — 37000009 HC ANESTHESIA EA ADD 15 MINS: Performed by: ORTHOPAEDIC SURGERY

## 2017-09-27 DEVICE — SCREW BONE 2.3 X 18MM: Type: IMPLANTABLE DEVICE | Site: WRIST | Status: FUNCTIONAL

## 2017-09-27 DEVICE — SCREW BNE LOK HEXLB 3.5X10: Type: IMPLANTABLE DEVICE | Site: WRIST | Status: FUNCTIONAL

## 2017-09-27 DEVICE — GUIDEWIRE ORTHO .054 X 6 IN: Type: IMPLANTABLE DEVICE | Site: WRIST | Status: FUNCTIONAL

## 2017-09-27 DEVICE — SCREW BNE LOK HEXLB 3.5X12: Type: IMPLANTABLE DEVICE | Site: WRIST | Status: FUNCTIONAL

## 2017-09-27 DEVICE — SCREW BNE N LOK HEXLB 3.5X12: Type: IMPLANTABLE DEVICE | Site: WRIST | Status: FUNCTIONAL

## 2017-09-27 DEVICE — SCREW BONE 2.3 X 16MM: Type: IMPLANTABLE DEVICE | Site: WRIST | Status: FUNCTIONAL

## 2017-09-27 DEVICE — SCREW BONE LOK CONG 2.3X20MM: Type: IMPLANTABLE DEVICE | Site: WRIST | Status: FUNCTIONAL

## 2017-09-27 DEVICE — PLATE BONE ACULOC 2 STANDARD: Type: IMPLANTABLE DEVICE | Site: WRIST | Status: FUNCTIONAL

## 2017-09-27 DEVICE — IMPLANTABLE DEVICE: Type: IMPLANTABLE DEVICE | Site: WRIST | Status: FUNCTIONAL

## 2017-09-27 RX ORDER — MIDAZOLAM HYDROCHLORIDE 1 MG/ML
2 INJECTION INTRAMUSCULAR; INTRAVENOUS ONCE
Status: COMPLETED | OUTPATIENT
Start: 2017-09-27 | End: 2017-09-27

## 2017-09-27 RX ORDER — FENTANYL CITRATE 50 UG/ML
100 INJECTION, SOLUTION INTRAMUSCULAR; INTRAVENOUS EVERY 5 MIN PRN
Status: COMPLETED | OUTPATIENT
Start: 2017-09-27 | End: 2017-09-27

## 2017-09-27 RX ORDER — PROPOFOL 10 MG/ML
VIAL (ML) INTRAVENOUS CONTINUOUS PRN
Status: DISCONTINUED | OUTPATIENT
Start: 2017-09-27 | End: 2017-09-27

## 2017-09-27 RX ORDER — DIPHENHYDRAMINE HYDROCHLORIDE 50 MG/ML
12.5 INJECTION INTRAMUSCULAR; INTRAVENOUS EVERY 30 MIN PRN
Status: DISCONTINUED | OUTPATIENT
Start: 2017-09-27 | End: 2017-09-27 | Stop reason: HOSPADM

## 2017-09-27 RX ORDER — ROPIVACAINE HYDROCHLORIDE 5 MG/ML
INJECTION, SOLUTION EPIDURAL; INFILTRATION; PERINEURAL
Status: DISCONTINUED | OUTPATIENT
Start: 2017-09-27 | End: 2017-09-27

## 2017-09-27 RX ORDER — OXYCODONE AND ACETAMINOPHEN 7.5; 325 MG/1; MG/1
1 TABLET ORAL EVERY 4 HOURS PRN
Qty: 30 TABLET | Refills: 0 | Status: SHIPPED | OUTPATIENT
Start: 2017-09-27 | End: 2018-03-14

## 2017-09-27 RX ORDER — ONDANSETRON 8 MG/1
8 TABLET, ORALLY DISINTEGRATING ORAL EVERY 8 HOURS PRN
Status: DISCONTINUED | OUTPATIENT
Start: 2017-09-27 | End: 2017-09-27 | Stop reason: HOSPADM

## 2017-09-27 RX ORDER — OXYCODONE HYDROCHLORIDE 5 MG/1
5 TABLET ORAL
Status: DISCONTINUED | OUTPATIENT
Start: 2017-09-27 | End: 2017-09-27 | Stop reason: HOSPADM

## 2017-09-27 RX ORDER — SODIUM CHLORIDE, SODIUM LACTATE, POTASSIUM CHLORIDE, CALCIUM CHLORIDE 600; 310; 30; 20 MG/100ML; MG/100ML; MG/100ML; MG/100ML
INJECTION, SOLUTION INTRAVENOUS CONTINUOUS PRN
Status: DISCONTINUED | OUTPATIENT
Start: 2017-09-27 | End: 2017-09-27

## 2017-09-27 RX ORDER — KETOROLAC TROMETHAMINE 30 MG/ML
15 INJECTION, SOLUTION INTRAMUSCULAR; INTRAVENOUS ONCE
Status: COMPLETED | OUTPATIENT
Start: 2017-09-27 | End: 2017-09-27

## 2017-09-27 RX ORDER — CLINDAMYCIN PHOSPHATE 900 MG/50ML
900 INJECTION, SOLUTION INTRAVENOUS
Status: COMPLETED | OUTPATIENT
Start: 2017-09-27 | End: 2017-09-27

## 2017-09-27 RX ORDER — MEPERIDINE HYDROCHLORIDE 50 MG/ML
12.5 INJECTION INTRAMUSCULAR; INTRAVENOUS; SUBCUTANEOUS ONCE AS NEEDED
Status: DISCONTINUED | OUTPATIENT
Start: 2017-09-27 | End: 2017-09-27 | Stop reason: HOSPADM

## 2017-09-27 RX ORDER — BACITRACIN 50000 [IU]/1
INJECTION, POWDER, FOR SOLUTION INTRAMUSCULAR
Status: DISCONTINUED | OUTPATIENT
Start: 2017-09-27 | End: 2017-09-27 | Stop reason: HOSPADM

## 2017-09-27 RX ORDER — LIDOCAINE HCL/EPINEPHRINE/PF 2%-1:200K
VIAL (ML) INJECTION
Status: DISCONTINUED | OUTPATIENT
Start: 2017-09-27 | End: 2017-09-27

## 2017-09-27 RX ORDER — SODIUM CHLORIDE 0.9 % (FLUSH) 0.9 %
3 SYRINGE (ML) INJECTION
Status: DISCONTINUED | OUTPATIENT
Start: 2017-09-27 | End: 2017-09-27 | Stop reason: HOSPADM

## 2017-09-27 RX ORDER — HYDROMORPHONE HYDROCHLORIDE 2 MG/ML
0.4 INJECTION, SOLUTION INTRAMUSCULAR; INTRAVENOUS; SUBCUTANEOUS EVERY 5 MIN PRN
Status: DISCONTINUED | OUTPATIENT
Start: 2017-09-27 | End: 2017-09-27 | Stop reason: HOSPADM

## 2017-09-27 RX ORDER — ACETAMINOPHEN 325 MG/1
650 TABLET ORAL EVERY 4 HOURS PRN
Status: DISCONTINUED | OUTPATIENT
Start: 2017-09-27 | End: 2017-09-27 | Stop reason: HOSPADM

## 2017-09-27 RX ORDER — ONDANSETRON 2 MG/ML
4 INJECTION INTRAMUSCULAR; INTRAVENOUS DAILY PRN
Status: DISCONTINUED | OUTPATIENT
Start: 2017-09-27 | End: 2017-09-27 | Stop reason: HOSPADM

## 2017-09-27 RX ORDER — FENTANYL CITRATE 50 UG/ML
25 INJECTION, SOLUTION INTRAMUSCULAR; INTRAVENOUS EVERY 5 MIN PRN
Status: DISCONTINUED | OUTPATIENT
Start: 2017-09-27 | End: 2017-09-27 | Stop reason: HOSPADM

## 2017-09-27 RX ORDER — OXYCODONE HYDROCHLORIDE 5 MG/1
10 TABLET ORAL EVERY 4 HOURS PRN
Status: DISCONTINUED | OUTPATIENT
Start: 2017-09-27 | End: 2017-09-27 | Stop reason: HOSPADM

## 2017-09-27 RX ORDER — KETOROLAC TROMETHAMINE 30 MG/ML
15 INJECTION, SOLUTION INTRAMUSCULAR; INTRAVENOUS ONCE
Status: DISCONTINUED | OUTPATIENT
Start: 2017-09-27 | End: 2017-09-27

## 2017-09-27 RX ORDER — ACETAMINOPHEN 10 MG/ML
INJECTION, SOLUTION INTRAVENOUS
Status: DISCONTINUED | OUTPATIENT
Start: 2017-09-27 | End: 2017-09-27

## 2017-09-27 RX ADMIN — KETOROLAC TROMETHAMINE 15 MG: 30 INJECTION, SOLUTION INTRAMUSCULAR at 07:09

## 2017-09-27 RX ADMIN — ROPIVACAINE HYDROCHLORIDE 20 ML: 5 INJECTION, SOLUTION EPIDURAL; INFILTRATION; PERINEURAL at 04:09

## 2017-09-27 RX ADMIN — SODIUM CHLORIDE, SODIUM LACTATE, POTASSIUM CHLORIDE, AND CALCIUM CHLORIDE: 600; 310; 30; 20 INJECTION, SOLUTION INTRAVENOUS at 07:09

## 2017-09-27 RX ADMIN — MIDAZOLAM HYDROCHLORIDE 2 MG: 1 INJECTION INTRAMUSCULAR; INTRAVENOUS at 05:09

## 2017-09-27 RX ADMIN — PROPOFOL 50 MCG/KG/MIN: 10 INJECTION, EMULSION INTRAVENOUS at 05:09

## 2017-09-27 RX ADMIN — FENTANYL CITRATE 100 MCG: 50 INJECTION, SOLUTION INTRAMUSCULAR; INTRAVENOUS at 04:09

## 2017-09-27 RX ADMIN — ACETAMINOPHEN 1000 MG: 10 INJECTION, SOLUTION INTRAVENOUS at 04:09

## 2017-09-27 RX ADMIN — FENTANYL CITRATE 25 MCG: 50 INJECTION, SOLUTION INTRAMUSCULAR; INTRAVENOUS at 07:09

## 2017-09-27 RX ADMIN — MIDAZOLAM HYDROCHLORIDE 2 MG: 1 INJECTION INTRAMUSCULAR; INTRAVENOUS at 04:09

## 2017-09-27 RX ADMIN — SODIUM CHLORIDE, SODIUM LACTATE, POTASSIUM CHLORIDE, AND CALCIUM CHLORIDE: 600; 310; 30; 20 INJECTION, SOLUTION INTRAVENOUS at 04:09

## 2017-09-27 RX ADMIN — CLINDAMYCIN PHOSPHATE 900 MG: 18 INJECTION, SOLUTION INTRAVENOUS at 05:09

## 2017-09-27 RX ADMIN — LIDOCAINE HYDROCHLORIDE,EPINEPHRINE BITARTRATE 20 ML: 20; .005 INJECTION, SOLUTION EPIDURAL; INFILTRATION; INTRACAUDAL; PERINEURAL at 04:09

## 2017-09-27 NOTE — INTERVAL H&P NOTE
The patient has been examined and the H&P has been reviewed:    I concur with the findings and changes have been noted since the H&P was written: Patient is admitted now for ORIF left wrist fracture    Anesthesia/Surgery risks, benefits and alternative options discussed and understood by patient/family.          Active Hospital Problems    Diagnosis  POA    Distal radius fracture, left [S52.599A]  Yes      Resolved Hospital Problems    Diagnosis Date Resolved POA   No resolved problems to display.

## 2017-09-27 NOTE — H&P (VIEW-ONLY)
Orthopaedic Surgery  Consult Note    Marileebandar Colmenares  09/25/2017    HPI:    CC: left wrist pain    Patient is a 57 y.o. female with PMHx significant for asthma, OA of the knee, GERD, and stroke presents with left wrist pain after a fall earlier today. She was walking her dog when the dog pulled at the leash and she fell forward onto her outstretched left hand. She did not hit her head or lose consciousness. She denies pain in any other locations. She does not have numbness or tingling.     Past Medical History:   Diagnosis Date    Abnormal Pap smear     Asthma     DJD (degenerative joint disease) of knee     GERD (gastroesophageal reflux disease)     Stroke 2015     Past Surgical History:   Procedure Laterality Date    BREAST SURGERY  1990 and 2001    right side and left side/begin fibroid tumor    CERVIX SURGERY      colpo 2002      skin cancers      TONSILLECTOMY      childhood    TONSILLECTOMY, ADENOIDECTOMY      childhood     Family History   Problem Relation Age of Onset    Stroke Mother     Stroke Father     Diabetes Maternal Aunt     Cancer Maternal Aunt      thyroid cancer    Melanoma Maternal Aunt     Heart disease Maternal Uncle     Heart disease Paternal Uncle     Cancer Paternal Uncle      prostate cancer    Heart disease Maternal Grandmother     Diabetes Maternal Grandmother     Heart disease Maternal Grandfather     Heart disease Paternal Grandmother     Heart disease Paternal Grandfather     Cancer Paternal Grandfather      bone cancer    Cancer Paternal Aunt      stomach cancer    Breast cancer Neg Hx     Colon cancer Neg Hx     Ovarian cancer Neg Hx      Social History     Social History    Marital status:      Spouse name: N/A    Number of children: N/A    Years of education: N/A     Occupational History          Social History Main Topics    Smoking status: Former Smoker    Smokeless tobacco: Never Used      Comment: quit >28 years  ago    Alcohol use 6.0 oz/week     10 Glasses of wine per week      Comment: has at least one drink daily/10-12 per week    Drug use: No    Sexual activity: Yes     Partners: Male     Birth control/ protection: Post-menopausal     Other Topics Concern    Not on file     Social History Narrative    Retired from     Now working on novel writing and blogger         No current facility-administered medications on file prior to encounter.      Current Outpatient Prescriptions on File Prior to Encounter   Medication Sig    albuterol 90 mcg/actuation inhaler Inhale 2 puffs into the lungs every 6 (six) hours as needed for Wheezing.    aspirin (ECOTRIN) 325 MG EC tablet Take 1 tablet (325 mg total) by mouth once daily.    atorvastatin (LIPITOR) 20 MG tablet Take 1 tablet (20 mg total) by mouth once daily.    desloratadine (CLARINEX) 5 mg tablet TAKE 1 TABLET (5 MG TOTAL) BY MOUTH ONCE DAILY.    FLUARIX QUAD 3002-4621, PF, 60 mcg (15 mcg x 4)/0.5 mL Syrg TO BE ADMINISTERED BY PHARMACIST FOR IMMUNIZATION    guaifenesin (MUCINEX) 600 mg 12 hr tablet Take 1,200 mg by mouth 2 (two) times daily.    montelukast (SINGULAIR) 10 mg tablet TAKE 1 TABLET (10 MG TOTAL) BY MOUTH EVERY EVENING.    ranitidine (ZANTAC) 150 MG capsule Take 150 mg by mouth 2 (two) times daily.         Review of Systems:    Patient denies constitutional symptoms, cardiac symptoms, respiratory symptoms, GI symptoms, psychiatric symptoms, endocrine related symptoms.  The remainder of the musculoskeletal ROS is included in the HPI.    Physical Exam:    Temp:  [98.2 °F (36.8 °C)] 98.2 °F (36.8 °C)  Pulse:  [62-65] 62  Resp:  [20] 20  SpO2:  [99 %-100 %] 99 %  BP: (130-151)/(69-74) 148/74    PE:    AA&O x 4.  NAD  HEENT:  NCAT, sclera nonicteric  Lungs:  Respirations are equal and unlabored.  CV:  2+ bilateral upper and lower extremity pulses.  Skin:  Intact throughout.    MSK:  LUE: Skin intact, moderate swelling and obvious deformity  of the wrist; TTP around wrist; SILT throughout M/U/R nerve distributions; grossly motor intact AIN/PIN/R/U nerves; brisk cap refill, 2+ DR pulse    Diagnostic Results:  Radiographs of left wrist demonstrate dorsally displaced, comminuted distal radius fracture    A/P:  57 y.o. female with a closed left distal radius fracture  - Reduced and splinted in ED under hematoma block  - NWB to LUE, pt encouraged to keep extremity iced and elevated at all times  - Pt will need operative fixation of this fracture. I have explained the risks, benefits, and alternatives of the procedure in detail. The patient voices understanding and all questions have been answered. The patient agrees to proceed as planned. Pre-op workup in ED.  - F/u this week with Dr. Rjoo for pre-op with plans for ORIF.    Procedure note:  After time out was performed and patient ID, side, and site were verified, the left wrist was sterilly prepped in the standard fashion. A 22-gauge needle was introduced into the fracture site without complication. 20cc of 1% lidocaine was then injected to the fracture site without difficulty. After adequate analgesia, the fracture was closed reduced under c-arm guidance and adequate reduction was obtained. A sugartong splint was applied and then post-reduction films were obtained which verified maintenance of the reduction. The patient tolerated the procedure well with no complications. Blood loss was minimal.    Rosanne Green MD PGY-2  Orthopaedic Surgery Resident

## 2017-09-27 NOTE — ANESTHESIA PREPROCEDURE EVALUATION
09/27/2017  Marilee Mckee is a 57 y.o., female.    Anesthesia Evaluation    I have reviewed the Patient Summary Reports.    I have reviewed the Nursing Notes.   I have reviewed the Medications.     Review of Systems  Anesthesia Hx:  Denies Family Hx of Anesthesia complications.   Denies Personal Hx of Anesthesia complications.   Social:  Non-Smoker    Hematology/Oncology:  Hematology Normal   Oncology Normal     EENT/Dental:EENT/Dental Normal   Cardiovascular:   Exercise tolerance: good hyperlipidemia    Pulmonary:   Asthma (no inhaler x months)    Renal/:  Renal/ Normal     Hepatic/GI:   GERD    Musculoskeletal:   Arthritis     Neurological:   CVA (unknown etiology), no residual symptoms    Endocrine:  Endocrine Normal    Dermatological:  Skin Normal    Psych:  Psychiatric Normal           Physical Exam  General:  Well nourished    Airway/Jaw/Neck:  Airway Findings: Mouth Opening: Normal Mallampati: II  TM Distance: Normal, at least 6 cm  Jaw/Neck Findings:   TMJ pops    Dental:  Dental Findings: In tact        Mental Status:  Mental Status Findings:  Cooperative, Alert and Oriented         Anesthesia Plan  Type of Anesthesia, risks & benefits discussed:  Anesthesia Type:  regional  Patient's Preference:   Intra-op Monitoring Plan: standard ASA monitors  Intra-op Monitoring Plan Comments:   Post Op Pain Control Plan:   Post Op Pain Control Plan Comments:   Induction:   IV  Beta Blocker:         Informed Consent: Patient understands risks and agrees with Anesthesia plan.  Questions answered. Anesthesia consent signed with patient.  ASA Score: 3     Day of Surgery Review of History & Physical:    H&P update referred to the surgeon.         Ready For Surgery From Anesthesia Perspective.

## 2017-09-27 NOTE — ANESTHESIA PROCEDURE NOTES
Axillary block    Patient location during procedure: holding area    Reason for block: primary anesthetic   Diagnosis:  (Wrist fracture, left, closed, initial encounter (S62.102A))   Start time: 9/27/2017 4:16 PM  Timeout: 9/27/2017 4:15 PM   End time: 9/27/2017 4:22 PM  Staffing  Anesthesiologist: DENICE DALEY  Performed: anesthesiologist   Preanesthetic Checklist  Completed: patient identified, site marked, surgical consent, pre-op evaluation, timeout performed, IV checked, risks and benefits discussed and monitors and equipment checked  Peripheral Block  Patient position: supine  Prep: ChloraPrep  Patient monitoring: heart rate, cardiac monitor, continuous pulse ox and frequent blood pressure checks  Block type: axillary  Laterality: left  Injection technique: single shot  Needle  Needle type: short-bevel   Needle gauge: 22 G  Needle length: 3.5 in  Needle localization: nerve stimulator and ultrasound guidance   -ultrasound image captured on disc.  Assessment  Injection assessment: negative aspiration, negative parasthesia and local visualized surrounding nerve  Heart rate change: no  Medications:  Bolus administered: 20 mL of 1.5 lidocaine (+ rop 0.5% 20cc)  Epinephrine added: 5 mcg/mL (1/200,000)

## 2017-09-28 ENCOUNTER — TELEPHONE (OUTPATIENT)
Dept: ORTHOPEDICS | Facility: CLINIC | Age: 57
End: 2017-09-28

## 2017-09-28 NOTE — TELEPHONE ENCOUNTER
----- Message from Liane Fischer sent at 9/28/2017 11:07 AM CDT -----  Contact: BLOSSOM MORENO [[id number]]  _x  1st Request  _  2nd Request  _  3rd Request        Who: BLOSSOM MORENO [[id number]]    Why: Patient would like to schedule a one week post op appt from her procedure she had on yesterday 9/27.  (next available 11/1)    What Number to Call Back: [phone]    When to Expect a call back: (Before the end of the day)   -- if call after 3:00 call back will be tomorrow.

## 2017-09-28 NOTE — DISCHARGE INSTRUCTIONS
Anesthesia: Monitored Anesthesia Care (MAC)    Anesthesia Safety  · Have an adult family member or friend drive you home after the procedure.  · For the first 24 hours after your surgery:  ¨ Do not drive or use heavy equipment.  ¨ Do not make important decisions or sign documents.  ¨ Avoid alcohol.  ¨ Have someone stay with you, if possible. They can watch for problems and help keep you safe.

## 2017-09-28 NOTE — PLAN OF CARE
Marilee VENUS Mckee has met all discharge criteria from Phase II. Vital Signs are stable, ambulating  without difficulty. Discharge instructions given, patient verbalized understanding. Discharged from facility via wheelchair in stable condition.

## 2017-09-28 NOTE — ANESTHESIA POSTPROCEDURE EVALUATION
"Anesthesia Post Evaluation    Patient: Marilee Mckee    Procedure(s) Performed: Procedure(s) (LRB):  OPEN REDUCTION INTERNAL FIXATION- DISTAL RADIUS (Left)    Final Anesthesia Type: regional  Patient location during evaluation: [facility]  Patient participation: Yes- Able to Participate  Level of consciousness: awake and alert  Post-procedure vital signs: reviewed and stable  Pain management: adequate  Airway patency: patent  PONV status at discharge: No PONV  Anesthetic complications: no      Cardiovascular status: blood pressure returned to baseline  Respiratory status: unassisted and spontaneous ventilation  Hydration status: euvolemic  Follow-up not needed.        Visit Vitals  /74 (BP Location: Left arm, Patient Position: Sitting)   Pulse 70   Temp 37.1 °C (98.8 °F) (Oral)   Resp 16   Ht 5' 4" (1.626 m)   Wt 68 kg (150 lb)   LMP 04/21/2012   SpO2 96%   BMI 25.75 kg/m²       Pain/Karie Score: Pain Assessment Performed: Yes (9/27/2017  3:25 PM)  Presence of Pain: complains of pain/discomfort (9/27/2017  3:25 PM)      "

## 2017-09-28 NOTE — BRIEF OP NOTE
[facility]  Brief Operative Note     SUMMARY     Surgery Date: 9/27/2017     Surgeon(s) and Role:     * Gary Regan Jr., MD - Primary    Assisting Surgeon: None    Pre-op Diagnosis:  Wrist fracture, left, closed, initial encounter [S62.102A]    Post-op Diagnosis:  Post-Op Diagnosis Codes:     * Wrist fracture, left, closed, initial encounter [S62.102A]    Procedure(s) (LRB):  OPEN REDUCTION INTERNAL FIXATION- DISTAL RADIUS (Left)    Anesthesia: Regional    Description of the findings of the procedure: left wrist fx    Findings/Key Components: ORIF left wrist    Estimated Blood Loss: * No values recorded between 9/27/2017  5:44 PM and 9/27/2017  7:17 PM *         Specimens:   Specimen (12h ago through future)    None          Discharge Note    SUMMARY     Admit Date: 9/27/2017    Discharge Date and Time:  09/27/2017 7:17 PM    Hospital Course (synopsis of major diagnoses, care, treatment, and services provided during the course of the hospital stay): see op note     Final Diagnosis: Post-Op Diagnosis Codes:     * Wrist fracture, left, closed, initial encounter [S62.102A]    Disposition: Home or Self Care    Follow Up/Patient Instructions:     Medications:  Reconciled Home Medications:   Current Discharge Medication List      START taking these medications    Details   oxycodone-acetaminophen (PERCOCET) 7.5-325 mg per tablet Take 1 tablet by mouth every 4 (four) hours as needed for Pain.  Qty: 30 tablet, Refills: 0         CONTINUE these medications which have NOT CHANGED    Details   atorvastatin (LIPITOR) 20 MG tablet Take 1 tablet (20 mg total) by mouth once daily.  Qty: 90 tablet, Refills: 3      desloratadine (CLARINEX) 5 mg tablet TAKE 1 TABLET (5 MG TOTAL) BY MOUTH ONCE DAILY.  Qty: 90 tablet, Refills: 2      guaifenesin (MUCINEX) 600 mg 12 hr tablet Take 1,200 mg by mouth 2 (two) times daily.      hydrocodone-acetaminophen 5-325mg (NORCO) 5-325 mg per tablet Take 1 tablet by mouth  every 4 (four) hours as needed for Pain.  Qty: 18 tablet, Refills: 0      montelukast (SINGULAIR) 10 mg tablet TAKE 1 TABLET (10 MG TOTAL) BY MOUTH EVERY EVENING.  Qty: 90 tablet, Refills: 3      ranitidine (ZANTAC) 150 MG capsule Take 150 mg by mouth 2 (two) times daily.        albuterol 90 mcg/actuation inhaler Inhale 2 puffs into the lungs every 6 (six) hours as needed for Wheezing.  Qty: 18 g, Refills: 0      aspirin (ECOTRIN) 325 MG EC tablet Take 1 tablet (325 mg total) by mouth once daily.  Refills: 0      FLUARIX QUAD 1017-6488, PF, 60 mcg (15 mcg x 4)/0.5 mL Syrg TO BE ADMINISTERED BY PHARMACIST FOR IMMUNIZATION  Refills: 0             Discharge Procedure Orders  Diet general     Shower on day dressing removed (No bath)     Call MD for:  temperature >100.4     Call MD for:  persistent nausea and vomiting     Call MD for:  severe uncontrolled pain     Leave dressing on - Keep it clean, dry, and intact until clinic visit     Keep surgical extremity elevated       Follow-up Information     [facility]. Schedule an appointment as soon as possible for a visit in 1 week.    Specialty:  Orthopedics  Why:  For wound re-check  Contact information:  [address], [address]  [facility] [address] [phone]  Additional information:  [facility], [address], [address]

## 2017-09-28 NOTE — OP NOTE
DATE OF PROCEDURE:  09/27/2017.    PREOPERATIVE DIAGNOSIS:  Left distal radius fracture, three or more fragments,   intraarticular, displaced.    POSTOPERATIVE DIAGNOSIS:  Left distal radius fracture, three or more fragments,   intraarticular, displaced.    OPERATIVE PROCEDURE:  Open reduction and internal fixation of left distal radius   fracture, three or more fragments intraarticular using Acumed volar locking   plate.    SURGEON:  Gary Regan Jr., M.D.    ANESTHESIA:  Regional block.    ESTIMATED BLOOD LOSS:  20 mL    COMPLICATIONS:  None.    SPECIMENS:  None.    BRIEF INDICATIONS:  This is a 57-year-old female fell on her outstretched left   hand sustained complex fracture, intra-articular, displaced left distal radius,   taken to Surgery for the above procedure.    OPERATIVE PROCEDURE IN DETAIL:  After operative consent was obtained, the   patient was brought to the Operating Room, placed supine on the operating room   table.  Anesthesia by regional block was performed by the Anesthesia staff.    After the left arm was fully anesthetized, the tourniquet was applied to the   left arm and the left upper extremity was prepped and draped out in the normal   sterile fashion.  Esmarch used to exsanguinate the limb and the tourniquet   inflated to 225 mmHg.    Following this, a volar longitudinal incision was made over the FCR tendon with   a #15 blade.  Full-thickness skin flaps were elevated and hemostasis was   achieved with the Bovie.  Deep fascia was opened.  Deep retractors were placed   and the fracture site was identified.  It was very comminuted.  It was carefully   disimpacted with the [place] elevator reducing the articular surface and   elevating it.  All fragments were carefully interdigitated in and an Acumed   volar locking plate was applied and pins distally and proximally.  The C-arm   brought in to confirm the reduction to be near anatomic.  Screws were then   placed accordingly with a combination  of locking and nonlocking screws   proximally and distally achieving good purchase.  Care was taken use   fluoroscopy.  An x-ray views to ensure that no intraarticular screws were   penetrating and the screw lengths adjusted accordingly.  After final placement   of screws, range of motion of the wrist was checked including DRUJ and flexion   extension noted to be good and full without impingement.  The wound was   irrigated with antibiotic saline solution.  The deep fascia was closed with 3-0   Vicryl, subcutaneous tissue closed with 3-0 Vicryl, Steri-Strips on the skin.    Sterile bulky bandage was applied followed by volar splint.  Tourniquet was   deflated.  The patient was brought to the Recovery Room in stable condition.    All sponge and needle counts were reported as correct.  No complications.      GERRY/BISI  dd: 09/27/2017 19:20:51 (CDT)  td: 09/28/2017 02:01:03 (CDT)  Doc ID   #[id number]  Job ID #[id number]    CC:

## 2017-09-28 NOTE — TELEPHONE ENCOUNTER
I spoke with the patient and made her a post op appointment. She was made aware of date, time and location.

## 2017-10-04 ENCOUNTER — OFFICE VISIT (OUTPATIENT)
Dept: ORTHOPEDICS | Facility: CLINIC | Age: 57
End: 2017-10-04
Attending: ORTHOPAEDIC SURGERY
Payer: COMMERCIAL

## 2017-10-04 ENCOUNTER — TELEPHONE (OUTPATIENT)
Dept: INTERNAL MEDICINE | Facility: CLINIC | Age: 57
End: 2017-10-04

## 2017-10-04 VITALS — BODY MASS INDEX: 25.61 KG/M2 | HEIGHT: 64 IN | WEIGHT: 150 LBS

## 2017-10-04 DIAGNOSIS — E01.0 THYROMEGALY: Primary | ICD-10-CM

## 2017-10-04 DIAGNOSIS — S52.532D CLOSED COLLES' FRACTURE OF LEFT RADIUS WITH ROUTINE HEALING, SUBSEQUENT ENCOUNTER: Primary | ICD-10-CM

## 2017-10-04 PROCEDURE — 99999 PR PBB SHADOW E&M-EST. PATIENT-LVL II: CPT | Mod: PBBFAC,,, | Performed by: ORTHOPAEDIC SURGERY

## 2017-10-04 PROCEDURE — 99024 POSTOP FOLLOW-UP VISIT: CPT | Mod: S$GLB,,, | Performed by: ORTHOPAEDIC SURGERY

## 2017-10-04 RX ORDER — HYDROCODONE BITARTRATE AND ACETAMINOPHEN 7.5; 325 MG/1; MG/1
1 TABLET ORAL EVERY 4 HOURS PRN
Qty: 40 TABLET | Refills: 0 | Status: SHIPPED | OUTPATIENT
Start: 2017-10-04 | End: 2018-03-14

## 2017-10-04 NOTE — TELEPHONE ENCOUNTER
New patient had labs 9/14 and ultrasound 9/19.  Don't see we ever reported to patient results.    Ultrasound shows nodules.      Looks like she was in er 5 days after ultrasound for fracture and surgery.  Please advise what I should report to patient.    Thanks jami

## 2017-10-05 NOTE — TELEPHONE ENCOUNTER
Her testing was done except for the ultrassound.  I told her that we would put her on small dose of thyroid and recheck levels in 3 mo.  Ultrasound shows several small nodules but they think these should be aspirated to I have ordered that to be done with radiology

## 2017-10-05 NOTE — TELEPHONE ENCOUNTER
I told her dr's comments on labs, and ultrasound.  She is willing to schedule biopsy 1st week of November.  Recently fracture her arm and doesn' t have full use and  has oral surgery 10/20.    I sent raiza message to help her get appt booked.

## 2017-10-13 ENCOUNTER — TELEPHONE (OUTPATIENT)
Dept: INTERNAL MEDICINE | Facility: CLINIC | Age: 57
End: 2017-10-13

## 2017-10-13 NOTE — TELEPHONE ENCOUNTER
----- Message from Renée Luna sent at 10/13/2017  8:20 AM CDT -----  Just to make you aware.    Dr Andujar entered a referral for patient to have a FNA (US FINE NEEDLE ASPIRATION THYROID MULTI SITE (XPD).  When I spoke with patient to schedule she wanted to have this procedure done in November due to previous commitments.   The [place] Dept/Doctor has authorized this procedure for patient and she is on the list to be scheduled for November when that schedule opens up.  Patient is aware of all the above information.    Thyromegaly [E01.0]    Thanks, Isha

## 2017-10-18 ENCOUNTER — HOSPITAL ENCOUNTER (OUTPATIENT)
Dept: RADIOLOGY | Facility: OTHER | Age: 57
Discharge: HOME | End: 2017-10-18
Attending: ORTHOPAEDIC SURGERY
Payer: COMMERCIAL

## 2017-10-18 ENCOUNTER — OFFICE VISIT (OUTPATIENT)
Dept: ORTHOPEDICS | Facility: CLINIC | Age: 57
End: 2017-10-18
Attending: ORTHOPAEDIC SURGERY
Payer: COMMERCIAL

## 2017-10-18 VITALS — WEIGHT: 150 LBS | BODY MASS INDEX: 25.61 KG/M2 | HEIGHT: 64 IN

## 2017-10-18 DIAGNOSIS — M25.532 LEFT WRIST PAIN: ICD-10-CM

## 2017-10-18 DIAGNOSIS — M25.532 LEFT WRIST PAIN: Primary | ICD-10-CM

## 2017-10-18 DIAGNOSIS — S52.532D CLOSED COLLES' FRACTURE OF LEFT RADIUS WITH ROUTINE HEALING, SUBSEQUENT ENCOUNTER: ICD-10-CM

## 2017-10-18 PROCEDURE — 99024 POSTOP FOLLOW-UP VISIT: CPT | Mod: S$GLB,,, | Performed by: ORTHOPAEDIC SURGERY

## 2017-10-18 PROCEDURE — 99999 PR PBB SHADOW E&M-EST. PATIENT-LVL II: CPT | Mod: PBBFAC,,, | Performed by: ORTHOPAEDIC SURGERY

## 2017-10-18 PROCEDURE — 73100 X-RAY EXAM OF WRIST: CPT | Mod: TC,LT

## 2017-10-18 PROCEDURE — 73100 X-RAY EXAM OF WRIST: CPT | Mod: 26,LT,, | Performed by: RADIOLOGY

## 2017-10-18 NOTE — PROGRESS NOTES
HISTORY OF PRESENT ILLNESS:  Ms. Rice is three weeks out, ORIF of left   distal radius fracture.  She is doing well.  No problems reported.  No numbness   or tingling reported.    PHYSICAL EXAMINATION:  LEFT HAND AND WRIST:  Incision looks good, well healed volarly.  Swelling   minimal.  Range of motion of fingers full.   strength decreased.  Sensation   intact.  Range of motion of wrists limited.    X-RAYS:  AP and lateral, left wrist, demonstrate well-aligned distal radius   fracture, good position, hardware intact.    PLAN:  We will order some therapy at the [facility] to start OT all her   hand and wrist.  She can also do some exercise at home including range of motion   and warm water soaks.  Routine wound care, part-time use of the wrist splint.    She can have it off at home.  No heavy lifting.  Follow up in three to four   weeks.      KERI  dd: 10/18/2017 14:01:29 (CDT)  td: 10/19/2017 05:03:02 (CDT)  Doc ID   #[id number]  Job ID #[id number]    CC:

## 2017-10-23 ENCOUNTER — CLINICAL SUPPORT (OUTPATIENT)
Dept: REHABILITATION | Facility: HOSPITAL | Age: 57
End: 2017-10-23
Attending: ORTHOPAEDIC SURGERY
Payer: COMMERCIAL

## 2017-10-23 DIAGNOSIS — M25.532 LEFT WRIST PAIN: ICD-10-CM

## 2017-10-23 DIAGNOSIS — Z74.09 IMPAIRED MOBILITY AND ADLS: ICD-10-CM

## 2017-10-23 DIAGNOSIS — Z78.9 IMPAIRED MOBILITY AND ADLS: ICD-10-CM

## 2017-10-23 DIAGNOSIS — M25.639 DECREASED ROM OF WRIST: ICD-10-CM

## 2017-10-23 PROCEDURE — G8988 SELF CARE GOAL STATUS: HCPCS | Mod: CK,PO

## 2017-10-23 PROCEDURE — G8987 SELF CARE CURRENT STATUS: HCPCS | Mod: CL,PO

## 2017-10-23 PROCEDURE — 97165 OT EVAL LOW COMPLEX 30 MIN: CPT | Mod: PO

## 2017-10-23 PROCEDURE — 97018 PARAFFIN BATH THERAPY: CPT | Mod: PO

## 2017-10-23 NOTE — PATIENT INSTRUCTIONS
Extension (Active With Finger Flexion)        With fingers curled, bend hand back at wrist. Hold ____ seconds.  Repeat ____ times. Do ____ sessions per day.           Flexion (Active)        Bend wrist down with fingers curled.  Repeat ____ times. Do ____ sessions per day.            AROM: Wrist Ulnar Deviation        With right thumb down, bend wrist up.  Repeat ____ times per set. Do ____ sets per session. Do ____ sessions per day         Radial Deviation (Active With Finger Flexion)        With fingers curled, bend wrist toward thumb side.  Repeat ____ times. Do ____ sessions per day.          AROM: Forearm Pronation / Supination        With right arm in handshake position, slowly rotate palm down until stretch is felt. Relax. Then rotate palm up until stretch is felt.  Repeat ____ times per set. Do ____ sets per session. Do ____ sessions per day.    Copyright © [employer]. All rights reserved.

## 2017-10-23 NOTE — PLAN OF CARE
Occupational Therapy -Hand / Wrist  Evaluation    Patient: Marilee Mckee  Date of Evaluation: 10/23/2017  Referring Physician: Gary Regan Jr., *  Diagnosis: S/P ORIF   1. Left wrist pain     2. Decreased ROM of wrist     3. Impaired mobility and ADLs       MRN: [id number]  Age: 57 y.o.  Sex: female     Referral Orders:   Eval and treat       Start Time: 3  End Time: 4  Total Time: 60     Hand dominance: Right      Subjective     Marilee is a 57 y.o. female that presents to clinic s/p ORIF of L wrist secondary to  resulting from fall Injury/surgery occurred on 4 weeks ago.  X-ray and MRI was taken and revealed comminuted intra artiucluar distal radius fracture. Pt reports that motion increases her pain and  reports her pain at worst is a 5 on the VAS. No cultural, environmental, or spiritual barriers identified to treatment or learning.    Pain:  Functional Pain Scale Rating 0-10: 3/10 on average  Location: Left Wrist  Description: Aching and Burning      Functional Limitations: Patient presents with the following functional Limitations affecting ADLS, work and leisure tasks:   Previous functional status includes: Independent with all ADLs.     Current FunctionalStatus:   Exercise routine prior to onset:    DME owned:  None   Home/Living environment : lives with their spouse      Limitation of Functional Status as follows:   ADLs/IADLs:     - Feeding: Cutting and preparing food     - Bathing: Unable to shave under arms     - Dressing: Difficulty with butttons and fasteners     - Driving: Driving one handed and requires extended time      Leisure: Needlework/Sewing      Occupation:  Retired      Past Medical History/Physical Systems Review:   Past Medical History:   Diagnosis Date    Abnormal Pap smear     Asthma     DJD (degenerative joint disease) of knee     GERD (gastroesophageal reflux disease)     Stroke 2015     Review of patient's allergies indicates:   Allergen Reactions    Tetracyclines  Dermatitis    Ceclor [cefaclor] Other (See Comments)     Canker sores    Keflex [cephalexin] Other (See Comments)     Canker sores    Coconut Hives    Shrimp Hives     Current Outpatient Prescriptions   Medication Sig    albuterol 90 mcg/actuation inhaler Inhale 2 puffs into the lungs every 6 (six) hours as needed for Wheezing.    aspirin (ECOTRIN) 325 MG EC tablet Take 1 tablet (325 mg total) by mouth once daily.    atorvastatin (LIPITOR) 20 MG tablet Take 1 tablet (20 mg total) by mouth once daily.    desloratadine (CLARINEX) 5 mg tablet TAKE 1 TABLET (5 MG TOTAL) BY MOUTH ONCE DAILY.    FLUARIX QUAD 4544-9410, PF, 60 mcg (15 mcg x 4)/0.5 mL Syrg TO BE ADMINISTERED BY PHARMACIST FOR IMMUNIZATION    guaifenesin (MUCINEX) 600 mg 12 hr tablet Take 1,200 mg by mouth 2 (two) times daily.    hydrocodone-acetaminophen 5-325mg (NORCO) 5-325 mg per tablet Take 1 tablet by mouth every 4 (four) hours as needed for Pain.    hydrocodone-acetaminophen 7.5-325mg (NORCO) 7.5-325 mg per tablet Take 1 tablet by mouth every 4 (four) hours as needed for Pain.    montelukast (SINGULAIR) 10 mg tablet TAKE 1 TABLET (10 MG TOTAL) BY MOUTH EVERY EVENING.    oxycodone-acetaminophen (PERCOCET) 7.5-325 mg per tablet Take 1 tablet by mouth every 4 (four) hours as needed for Pain.    ranitidine (ZANTAC) 150 MG capsule Take 150 mg by mouth 2 (two) times daily.       No current facility-administered medications for this visit.          Barriers:  Environmental Concerns/ Fall Risk:  None  Barriers to Learning: None   Cultural/Spiritual : None   Developmental/Education: None   Abuse/ Neglect: none   Nutritional Deficit: None   Language: None   Hearing/Vision Deficit: None   Other: Hx of CVA    Objective     Observation:   Arrived with pre hetal wrist cock up donned  Slight swelling noted dorsally on wrist    Sensation:   Grossly Intact    Edema: Circumferential measurements:    Right Left   MPs 17.7 cm 18.1 cm   PPC (Proximal Swann  Crease) 18.5 cm 18.7 cm   PWC (Proximal Wrist Crease) 15.0 cm 17.0 cm       Range of Motion:      AROM   THUMB                          MP Ext/Flex 0/45                         IP Ext/Flex 0/52                         Swann ABD 45                         Radial ABD 40                         Opposition 0       Wrist ROM: left   EVAL   Flexion 43   Extension 27   Radial Deviation 15   Ulnar Deviation 20   Supination 70   Pronation 75       OUTCOME MEASURE: FOTO    Category: Self Care      Current Score  = 31% or 69% impaired  Goal at Discharge Score = 57% or 47% impaired    Score interpretation is as follows:  CL = least 60% but < 80% impaired, limited or restricted    Treatment today included: Patient received paraffin bath to L hand(s) for 10 minutes to increase blood flow, circulation, pain management and for tissue elasticity prior to participation in HEP (wrist flex/ext, rd/ud, sup/pro) x 30 reps.     Assessment     This is a 57 y.o. female referred to outpatient occupational/hand therapy and presents with a medical diagnosis of s/p distal radius ORIF and demonstrates limitations as described in the problem list/chart below. Following brief medical record review it is determined that pt will benefit from occupational therapy services in order to maximize pain free and/or functional use of left wrist. The following goals were discussed with the patient and patient is in agreement with them as to be addressed in the treatment plan. Pt was given a HEP consisting of wrist flex/ext, rd/ud, sup/pro and scar massage. Pt verbally understood the instructions as they were given and demonstrated proper form and technique during therapy. Pt was advise to perform these exercises free of pain, and to stop performing them if pain occurs. The patient's rehab potential is good.     Profile and History Assessment of Occupational Performance Level of Clinical Decision Making Complexity Score   Occupational Profile:   Marilee DONOVAN  Rafi is a 57 y.o. female who lives with their spouse and is currently retiredAnnemarie M Rafi has difficulty with  feeding, bathing and dressing  driving/transportation management, housework/household chores and medication management  affecting his/her daily functional abilities. His/her main goal for therapy is pain free motion.     Comorbidities:   hx of CVA    Medical and Therapy History Review:   Brief               Performance Deficits    Physical:  Joint Mobility  Muscle Power/Strength  Skin Integrity/Scar Formation  Edema   Strength  Pinch Strength  Fine Motor Coordination  Pain    Cognitive:  No Deficits    Psychosocial:    No Deficits     Clinical Decision Making:  low    Assessment Process:  Comprehensive Assessments    Modification/Need for Assistance:  Not Necessary    Intervention Selection:  Multiple Treatment Options       low  Based on PMHX, co morbidities , data from assessments and functional level of assistance required with task and clinical presentation directly impacting function.         Goals: ( 8 weeks)   1)   Patient to be IND with HEP and modalities for pain management  2)   Increase ROM 10 degrees in all wrist planes of motion to increase functional hand use for dressing.  3)   Assess  and pinch.   4)   Decrease edema .2-.3 cm to increase joint mobility /flexibility for functional hand use.   5)   Patient to score at 57% or more on FOTO to demonstrate improved perception of functional LUE use.        Plan     Pt to be treated by Occupational Therapy 2 times per week for 8 weeks during the certification period from 10/23/17 to 12/18/17 to achieve the established goals.     Treatment to include: Paraffin, Fluidotherapy, Manual therapy/joint mobilizations, Modalities for pain management, US 3 mhz, Therapeutic exercises/activities., Strengthening, Orthotic Fabrication/Fit/Training, Edema Control, Scar Management, Electrical Modalities, Joint Protection and Energy Conservation,  as well as any other treatments deemed necessary based on the patient's needs or progress.

## 2017-10-26 ENCOUNTER — CLINICAL SUPPORT (OUTPATIENT)
Dept: REHABILITATION | Facility: HOSPITAL | Age: 57
End: 2017-10-26
Attending: ORTHOPAEDIC SURGERY
Payer: COMMERCIAL

## 2017-10-26 DIAGNOSIS — Z78.9 IMPAIRED MOBILITY AND ADLS: ICD-10-CM

## 2017-10-26 DIAGNOSIS — Z74.09 IMPAIRED MOBILITY AND ADLS: ICD-10-CM

## 2017-10-26 DIAGNOSIS — M25.639 DECREASED ROM OF WRIST: ICD-10-CM

## 2017-10-26 DIAGNOSIS — M25.532 LEFT WRIST PAIN: ICD-10-CM

## 2017-10-26 PROCEDURE — 97140 MANUAL THERAPY 1/> REGIONS: CPT | Mod: PO

## 2017-10-26 PROCEDURE — 97110 THERAPEUTIC EXERCISES: CPT | Mod: PO

## 2017-10-26 PROCEDURE — 97018 PARAFFIN BATH THERAPY: CPT | Mod: PO

## 2017-10-26 PROCEDURE — 97035 APP MDLTY 1+ULTRASOUND EA 15: CPT | Mod: PO

## 2017-10-26 NOTE — PROGRESS NOTES
"OT Daily Progress Note    Patient:  Marilee DONOVAN [facility] #:  [id number]   Date of Note: 10/26/2017   Referring Physician:  Gary Regan Jr., *  Diagnosis:  S/P Distal Radius ORIF  Encounter Diagnoses   Name Primary?    Left wrist pain     Decreased ROM of wrist     Impaired mobility and ADLs         Start Time: 11  End Time: 12  Total Time: 60 min  Group Time: -    Visit 2 of 20 authorized    Number of Cancellations: 0  Number of No Shows: 0    Subjective:   "The scar massage hurts"  Pain: 4 out of 10     Objective:   Patient seen by OT this session. Treatment  consist of the following:  Paraffin, Fluidotherapy, Ultrasound, manual therapy/joint mobilization, Therapeutic exercises, Joint protection and Entergy conservation    Patient received paraffin bath to L hand(s) for 10 minutes to increase blood flow, circulation, pain management and for tissue elasticity prior to therex. Patient received ultrasound to  L area to increase blood flow, circulation, tissue elasticity, pain management and for wound/scar management for 8 minutes @ 3.3 Mhz, Intensity 0.6 w/cm2 at 100* duty cycle. Performed scar massage to L volar wrist area for 10 minutes to decrease adhesions and improve tensile glide. Pt was instructed on and performed the following therapuetic exercises while in fludio for improved ROM: flex/ext, rd/ud, sup/pro, hand adduction, and light ball squeeze x 2 min each. Performed juxtaciser task for improved ROM of wrist x 3 min.     Assessment:  Pt will continue to benefit from skilled OT intervention.  Tolerated all treatment well today with no adverse effects.  Still stiffness noted with all movements. Compliant with HEP. Patient continues to demonstrate limitation  with  ROM, Joint mobility, Stiffness, Decreased functional use, Decreased strength, Continued pain and Continued inflammation all of which interfere with pt's vocational and leisurely pursuits.         New/Revised Goals: Continue POC  1)   " Patient to be IND with HEP and modalities for pain management  2)   Increase ROM 10 degrees in all wrist planes of motion to increase functional hand use for dressing.  3)   Assess  and pinch.   4)   Decrease edema .2-.3 cm to increase joint mobility /flexibility for functional hand use.   5)   Patient to score at 57% or more on FOTO to demonstrate improved perception of functional LUE use.      Plan:  Continue 2x week x 6 weeks  during the certification period,.

## 2017-10-31 ENCOUNTER — CLINICAL SUPPORT (OUTPATIENT)
Dept: REHABILITATION | Facility: HOSPITAL | Age: 57
End: 2017-10-31
Attending: ORTHOPAEDIC SURGERY
Payer: COMMERCIAL

## 2017-10-31 DIAGNOSIS — Z78.9 IMPAIRED MOBILITY AND ADLS: ICD-10-CM

## 2017-10-31 DIAGNOSIS — M25.532 LEFT WRIST PAIN: ICD-10-CM

## 2017-10-31 DIAGNOSIS — M25.639 DECREASED ROM OF WRIST: ICD-10-CM

## 2017-10-31 DIAGNOSIS — Z74.09 IMPAIRED MOBILITY AND ADLS: ICD-10-CM

## 2017-10-31 PROCEDURE — 97140 MANUAL THERAPY 1/> REGIONS: CPT | Mod: PO

## 2017-10-31 PROCEDURE — 97018 PARAFFIN BATH THERAPY: CPT | Mod: PO

## 2017-10-31 PROCEDURE — 97110 THERAPEUTIC EXERCISES: CPT | Mod: PO

## 2017-10-31 PROCEDURE — 97035 APP MDLTY 1+ULTRASOUND EA 15: CPT | Mod: PO

## 2017-10-31 NOTE — PROGRESS NOTES
"OT Daily Progress Note    Patient:  Marilee DONOVAN [facility] #:  [id number]   Date of Note: 10/31/2017   Referring Physician:  Gary Regan Jr., *  Diagnosis:  S/P Distal Radius ORIF  Encounter Diagnoses   Name Primary?    Left wrist pain     Decreased ROM of wrist     Impaired mobility and ADLs         Start Time: 10  End Time: 11  Total Time: 60 min  Group Time: -    Visit 3 of 20 authorized    Number of Cancellations: 0  Number of No Shows: 0    Subjective:   "its doing okay"  Pain: 4 out of 10     Objective:   Patient seen by OT this session. Treatment  consist of the following:  Paraffin, Fluidotherapy, Ultrasound, manual therapy/joint mobilization, Therapeutic exercises, Joint protection and Entergy conservation    Patient received paraffin bath to L hand(s) for 10 minutes to increase blood flow, circulation, pain management and for tissue elasticity prior to therex. Patient received ultrasound to  L area to increase blood flow, circulation, tissue elasticity, pain management and for wound/scar management for 8 minutes @ 3.3 Mhz, Intensity 0.6 w/cm2 at 100* duty cycle. Performed scar massage to L volar wrist area for 10 minutes to decrease adhesions and improve tensile glide. Pt was instructed on and performed the following therapuetic exercises: juxtaciser, tennis bal bounce and catch (supination/pronation),     Assessment:  Pt will continue to benefit from skilled OT intervention.  Tolerated all treatment well today with no adverse effects.  Still stiffness noted with all movements. Compliant with HEP. Patient continues to demonstrate limitation  with  ROM, Joint mobility, Stiffness, Decreased functional use, Decreased strength, Continued pain and Continued inflammation all of which interfere with pt's vocational and leisurely pursuits.         New/Revised Goals: Continue POC  1)   Patient to be IND with HEP and modalities for pain management  2)   Increase ROM 10 degrees in all wrist planes of motion " to increase functional hand use for dressing.  3)   Assess  and pinch.   4)   Decrease edema .2-.3 cm to increase joint mobility /flexibility for functional hand use.   5)   Patient to score at 57% or more on FOTO to demonstrate improved perception of functional LUE use.      Plan:  Continue 2x week x 6 weeks  during the certification period,.

## 2017-11-02 ENCOUNTER — CLINICAL SUPPORT (OUTPATIENT)
Dept: REHABILITATION | Facility: HOSPITAL | Age: 57
End: 2017-11-02
Attending: ORTHOPAEDIC SURGERY
Payer: COMMERCIAL

## 2017-11-02 DIAGNOSIS — Z74.09 IMPAIRED MOBILITY AND ADLS: ICD-10-CM

## 2017-11-02 DIAGNOSIS — M25.639 DECREASED ROM OF WRIST: ICD-10-CM

## 2017-11-02 DIAGNOSIS — Z78.9 IMPAIRED MOBILITY AND ADLS: ICD-10-CM

## 2017-11-02 DIAGNOSIS — M25.532 LEFT WRIST PAIN: ICD-10-CM

## 2017-11-02 PROCEDURE — 97018 PARAFFIN BATH THERAPY: CPT | Mod: PO

## 2017-11-02 PROCEDURE — 97035 APP MDLTY 1+ULTRASOUND EA 15: CPT | Mod: PO

## 2017-11-02 PROCEDURE — 97110 THERAPEUTIC EXERCISES: CPT | Mod: PO

## 2017-11-02 NOTE — PROGRESS NOTES
"OT Daily Progress Note    Patient:  Marilee DONOVAN [facility] #:  [id number]   Date of Note: 11/02/2017   Referring Physician:  Gary Regan Jr., *  Diagnosis:  S/P Distal Radius ORIF  Encounter Diagnoses   Name Primary?    Left wrist pain     Decreased ROM of wrist     Impaired mobility and ADLs         Start Time: 3  End Time: 4  Total Time: 60 min  Group Time: -    Visit 4 of 20 authorized    Number of Cancellations: 0  Number of No Shows: 0    Subjective:   "its doing ok, but its really hard for me to take out my contacts, I cant pinch"  Pain: 4 out of 10     Objective:   Patient seen by OT this session. Treatment  consist of the following:  Paraffin, Fluidotherapy, Ultrasound, manual therapy/joint mobilization, Therapeutic exercises, Joint protection and Entergy conservation    Patient received paraffin bath to L hand(s) for 10 minutes to increase blood flow, circulation, pain management and for tissue elasticity prior to therex. Patient received ultrasound to  L area to increase blood flow, circulation, tissue elasticity, pain management and for wound/scar management for 8 minutes @ 3.3 Mhz, Intensity 0.6 w/cm2 at 100* duty cycle. Performed scar massage to L volar wrist area for 10 minutes to decrease adhesions and improve tensile glide. Pt was instructed on and performed the following therapuetic exercises: juxtaciser, coordination balls tennis bal bounce and catch (supination/pronation), and added putty program (gross  and pinches) with yellow putty.    Assessment:  Pt will continue to benefit from skilled OT intervention.  Tolerated all treatment well today with no adverse effects.  Still stiffness noted with all movements. Edema controlled. Verbalized understanding with putty program. Compliant with HEP. Patient continues to demonstrate limitation  with  ROM, Joint mobility, Stiffness, Decreased functional use, Decreased strength, Continued pain and Continued inflammation all of which interfere " with pt's vocational and leisurely pursuits.         New/Revised Goals: Continue POC  1)   Patient to be IND with HEP and modalities for pain management  2)   Increase ROM 10 degrees in all wrist planes of motion to increase functional hand use for dressing.  3)   Assess  and pinch.   4)   Decrease edema .2-.3 cm to increase joint mobility /flexibility for functional hand use.   5)   Patient to score at 57% or more on FOTO to demonstrate improved perception of functional LUE use.      Plan:  Continue 2x week x 6 weeks  during the certification period,.

## 2017-11-06 ENCOUNTER — HOSPITAL ENCOUNTER (OUTPATIENT)
Dept: RADIOLOGY | Facility: HOSPITAL | Age: 57
Discharge: HOME | End: 2017-11-06
Attending: INTERNAL MEDICINE
Payer: COMMERCIAL

## 2017-11-06 DIAGNOSIS — E01.0 THYROMEGALY: ICD-10-CM

## 2017-11-06 PROCEDURE — 10022 US FINE NEEDLE ASPIRATION WITH IMAGING: CPT | Mod: ,,, | Performed by: RADIOLOGY

## 2017-11-06 PROCEDURE — 76942 ECHO GUIDE FOR BIOPSY: CPT | Mod: TC

## 2017-11-06 PROCEDURE — 88173 CYTOPATH EVAL FNA REPORT: CPT | Performed by: PATHOLOGY

## 2017-11-06 PROCEDURE — 76942 ECHO GUIDE FOR BIOPSY: CPT | Mod: 26,,, | Performed by: RADIOLOGY

## 2017-11-06 NOTE — H&P
Radiology History & Physical      SUBJECTIVE:     Chief Complaint: Thyroid nodules    History of Present Illness:  Marilee Mckee is a 57 y.o. female who presents for ultrasound-guided fine needle aspiration of bilateral thyroid nodules.  Past Medical History:   Diagnosis Date    Abnormal Pap smear     Asthma     DJD (degenerative joint disease) of knee     GERD (gastroesophageal reflux disease)     Stroke 2015     Past Surgical History:   Procedure Laterality Date    BREAST SURGERY  1990 and 2001    right side and left side/begin fibroid tumor    CERVIX SURGERY      colpo 2002      skin cancers      TONSILLECTOMY      childhood    TONSILLECTOMY, ADENOIDECTOMY      childhood       Home Meds:   Prior to Admission medications    Medication Sig Start Date End Date Taking? Authorizing Provider   albuterol 90 mcg/actuation inhaler Inhale 2 puffs into the lungs every 6 (six) hours as needed for Wheezing. 12/9/16 12/9/17  Nirav Prater MD   aspirin (ECOTRIN) 325 MG EC tablet Take 1 tablet (325 mg total) by mouth once daily. 5/21/15 12/9/16  Charlene Sue MD   atorvastatin (LIPITOR) 20 MG tablet Take 1 tablet (20 mg total) by mouth once daily. 9/12/17   Amadeo Andujar MD   desloratadine (CLARINEX) 5 mg tablet TAKE 1 TABLET (5 MG TOTAL) BY MOUTH ONCE DAILY. 8/10/17   Nirav Prater MD   FLUARIX QUAD [id number], PF, 60 mcg (15 mcg x 4)/0.5 mL Syrg TO BE ADMINISTERED BY PHARMACIST FOR IMMUNIZATION 9/15/16   Historical Provider, MD   guaifenesin (MUCINEX) 600 mg 12 hr tablet Take 1,200 mg by mouth 2 (two) times daily.    Historical Provider, MD   hydrocodone-acetaminophen 5-325mg (NORCO) 5-325 mg per tablet Take 1 tablet by mouth every 4 (four) hours as needed for Pain. 9/25/17   Lizette Brown MD   hydrocodone-acetaminophen 7.5-325mg (NORCO) 7.5-325 mg per tablet Take 1 tablet by mouth every 4 (four) hours as needed for Pain. 10/4/17   Gary Regan Jr., MD   montelukast (SINGULAIR) 10 mg tablet  TAKE 1 TABLET (10 MG TOTAL) BY MOUTH EVERY EVENING. 11/11/16   Nirav Prater MD   oxycodone-acetaminophen (PERCOCET) 7.5-325 mg per tablet Take 1 tablet by mouth every 4 (four) hours as needed for Pain. 9/27/17   Gary Regan Jr., MD   ranitidine (ZANTAC) 150 MG capsule Take 150 mg by mouth 2 (two) times daily.      Historical Provider, MD     Anticoagulants/Antiplatelets: aspirin    Allergies:   Review of patient's allergies indicates:   Allergen Reactions    Tetracyclines Dermatitis    Ceclor [cefaclor] Other (See Comments)     Canker sores    Keflex [cephalexin] Other (See Comments)     Canker sores    Coconut Hives    Shrimp Hives     Sedation History:  no adverse reactions    Review of Systems:   Hematological: no known coagulopathies  Respiratory: no shortness of breath  Cardiovascular: no chest pain  Gastrointestinal: no abdominal pain  Genito-Urinary: no dysuria  Musculoskeletal: negative  Neurological: no TIA or stroke symptoms         OBJECTIVE:     Vital Signs (Most Recent)       Physical Exam:    General: no acute distress  Mental Status: alert and oriented to person, place and time  HEENT: normocephalic, atraumatic  Chest: unlabored breathing  Heart: regular heart rate  Abdomen: nondistended  Extremity: moves all extremities    Laboratory  Lab Results   Component Value Date    INR 1.0 09/25/2017       Lab Results   Component Value Date    WBC 7.28 09/25/2017    HGB 15.5 09/25/2017    HCT 44.4 09/25/2017    MCV 93 09/25/2017     09/25/2017      Lab Results   Component Value Date     (H) 09/25/2017     09/25/2017    K 4.2 09/25/2017     09/25/2017    CO2 24 09/25/2017    BUN 20 09/25/2017    CREATININE 0.8 09/25/2017    CALCIUM 10.0 09/25/2017    ALT 21 09/14/2017    AST 28 09/14/2017    ALBUMIN 3.9 09/14/2017    BILITOT 0.8 09/14/2017    BILIDIR 0.3 06/10/2015       ASSESSMENT/PLAN:     Sedation Plan: local only  Patient will undergo ultrasound-guided fine needle  aspiration of bilateral thyroid nodules.    Anna Gamino MD  PGY-4  Department of Radiology  Pager: [phone]

## 2017-11-06 NOTE — PROCEDURES
Radiology Post-Procedure Note    Pre Op Diagnosis: Thyroid nodule    Post Op Diagnosis: same    Procedure: Ultrasound guided thyroid biopsy    Procedure performed by: Arabella Sykes MD    Written Informed Consent Obtained: Yes    Specimen Removed: YES 5 passes    Estimated Blood Loss: Minimal    Findings: Local anesthesia was used.    Fine needle aspiration was performed for evaluation of right sided thyroid nodule using ultrasound guidance.  Cytology was present during the examination to evaluate adequacy of the specimen which was sent to the laboratory for further analysis.    There were no complications and the patient tolerated the procedure well.  Please see Imaging report for further details.    Anna Gamino MD  PGY-4  Department of Radiology  Pager: [phone]

## 2017-11-07 ENCOUNTER — CLINICAL SUPPORT (OUTPATIENT)
Dept: REHABILITATION | Facility: HOSPITAL | Age: 57
End: 2017-11-07
Attending: ORTHOPAEDIC SURGERY
Payer: COMMERCIAL

## 2017-11-07 DIAGNOSIS — M25.532 LEFT WRIST PAIN: ICD-10-CM

## 2017-11-07 DIAGNOSIS — Z74.09 IMPAIRED MOBILITY AND ADLS: ICD-10-CM

## 2017-11-07 DIAGNOSIS — Z78.9 IMPAIRED MOBILITY AND ADLS: ICD-10-CM

## 2017-11-07 DIAGNOSIS — M25.639 DECREASED ROM OF WRIST: ICD-10-CM

## 2017-11-07 PROCEDURE — 97035 APP MDLTY 1+ULTRASOUND EA 15: CPT | Mod: PO

## 2017-11-07 PROCEDURE — 97018 PARAFFIN BATH THERAPY: CPT | Mod: PO

## 2017-11-07 PROCEDURE — 97110 THERAPEUTIC EXERCISES: CPT | Mod: PO

## 2017-11-07 PROCEDURE — 97140 MANUAL THERAPY 1/> REGIONS: CPT | Mod: PO

## 2017-11-07 NOTE — PROGRESS NOTES
"OT Daily Progress Note    Patient:  Marilee DONOVAN [facility] #:  [id number]   Date of Note: 11/07/2017   Referring Physician:  Gary Regan Jr., *  Diagnosis:  S/P Distal Radius ORIF  Encounter Diagnoses   Name Primary?    Left wrist pain     Decreased ROM of wrist     Impaired mobility and ADLs         Start Time: 3  End Time: 4  Total Time: 60 min  Group Time: -    Visit 4 of 20 authorized    Number of Cancellations: 0  Number of No Shows: 0    Subjective:   "its doing ok, but its really hard for me to take out my contacts, I cant pinch"  Pain: 4 out of 10     Objective:   Patient seen by OT this session. Treatment  consist of the following:  Paraffin, Fluidotherapy, Ultrasound, manual therapy/joint mobilization, Therapeutic exercises, Joint protection and Entergy conservation    Patient received paraffin bath to L hand(s) for 10 minutes to increase blood flow, circulation, pain management and for tissue elasticity prior to therex. Patient received ultrasound to  L area to increase blood flow, circulation, tissue elasticity, pain management and for wound/scar management for 8 minutes @ 3.3 Mhz, Intensity 0.6 w/cm2 at 100* duty cycle. Performed scar massage to L volar wrist area for 10 minutes to decrease adhesions and improve tensile glide. Pt was instructed on and performed the following therapuetic exercises: juxtaciser, coordination balls tennis bal bounce and catch (supination/pronation), and added putty program (gross  and pinches) with yellow putty. Received light PROM and LLPS in wrist flexion x 3 sets of 15 secs.     Assessment:  Pt will continue to benefit from skilled OT intervention.  Tolerated all treatment well today with no adverse effects.  Still stiffness noted with all movements. Edema controlled. Verbalized understanding with putty program. Continues to improve daily. Compliant with HEP. Patient continues to demonstrate limitation  with  ROM, Joint mobility, Stiffness, Decreased " functional use, Decreased strength, Continued pain and Continued inflammation all of which interfere with pt's vocational and leisurely pursuits.         New/Revised Goals: Continue POC  1)   Patient to be IND with HEP and modalities for pain management  2)   Increase ROM 10 degrees in all wrist planes of motion to increase functional hand use for dressing.  3)   Assess  and pinch.   4)   Decrease edema .2-.3 cm to increase joint mobility /flexibility for functional hand use.   5)   Patient to score at 57% or more on FOTO to demonstrate improved perception of functional LUE use.      Plan:  Continue 2x week x 6 weeks  during the certification period,.

## 2017-11-09 ENCOUNTER — CLINICAL SUPPORT (OUTPATIENT)
Dept: REHABILITATION | Facility: HOSPITAL | Age: 57
End: 2017-11-09
Attending: ORTHOPAEDIC SURGERY
Payer: COMMERCIAL

## 2017-11-09 DIAGNOSIS — M25.639 DECREASED ROM OF WRIST: ICD-10-CM

## 2017-11-09 DIAGNOSIS — Z74.09 IMPAIRED MOBILITY AND ADLS: ICD-10-CM

## 2017-11-09 DIAGNOSIS — Z78.9 IMPAIRED MOBILITY AND ADLS: ICD-10-CM

## 2017-11-09 DIAGNOSIS — M25.532 LEFT WRIST PAIN: ICD-10-CM

## 2017-11-09 PROCEDURE — 97110 THERAPEUTIC EXERCISES: CPT | Mod: PO

## 2017-11-09 PROCEDURE — 97018 PARAFFIN BATH THERAPY: CPT | Mod: PO

## 2017-11-09 NOTE — PROGRESS NOTES
"OT Daily Progress Note    Patient:  Marilee DONOVAN [facility] #:  [id number]   Date of Note: 11/09/2017   Referring Physician:  Gary Regan Jr., *  Diagnosis:  S/P Distal Radius ORIF  Encounter Diagnoses   Name Primary?    Left wrist pain     Decreased ROM of wrist     Impaired mobility and ADLs         Start Time: 2  End Time: 3  Total Time: 60 min  Group Time: -    Visit 5 of 20 authorized    Number of Cancellations: 0  Number of No Shows: 0    Subjective:   "I am doing so much better with my contacts"  Pain: 4 out of 10     Objective:   Patient seen by OT this session. Treatment  consist of the following:  Paraffin, Fluidotherapy, Ultrasound, manual therapy/joint mobilization, Therapeutic exercises, Joint protection and Entergy conservation    Patient received paraffin bath to L hand(s) for 10 minutes to increase blood flow, circulation, pain management and for tissue elasticity prior to therex. Patient received ultrasound to  L area to increase blood flow, circulation, tissue elasticity, pain management and for wound/scar management for 8 minutes @ 3.3 Mhz, Intensity 0.6 w/cm2 at 100* duty cycle. Performed scar massage to L volar wrist area for 10 minutes to decrease adhesions and improve tensile glide. Pt was instructed on and performed the following therapuetic exercises: juxtaciser, coordination balls tennis bal bounce and catch (supination/pronation), and added blooming to putty program. Received light PROM and LLPS in wrist flexion x 3 sets of 15 secs.     Assessment:  Pt will continue to benefit from skilled OT intervention.  Tolerated all treatment well today with no adverse effects.  Still stiffness noted with all movements. Edema controlled. Verbalized understanding with putty program. Minimal soreness reported but pt cotinues to  improve daily. Compliant with HEP. Patient continues to demonstrate limitation  with  ROM, Joint mobility, Stiffness, Decreased functional use, Decreased strength, " Continued pain and Continued inflammation all of which interfere with pt's vocational and leisurely pursuits.         New/Revised Goals: Continue POC  1)   Patient to be IND with HEP and modalities for pain management  2)   Increase ROM 10 degrees in all wrist planes of motion to increase functional hand use for dressing.  3)   Assess  and pinch.   4)   Decrease edema .2-.3 cm to increase joint mobility /flexibility for functional hand use.   5)   Patient to score at 57% or more on FOTO to demonstrate improved perception of functional LUE use.      Plan:  Continue 2x week x 6 weeks  during the certification period,.

## 2017-11-14 ENCOUNTER — CLINICAL SUPPORT (OUTPATIENT)
Dept: REHABILITATION | Facility: HOSPITAL | Age: 57
End: 2017-11-14
Attending: ORTHOPAEDIC SURGERY
Payer: COMMERCIAL

## 2017-11-14 DIAGNOSIS — Z74.09 IMPAIRED MOBILITY AND ADLS: ICD-10-CM

## 2017-11-14 DIAGNOSIS — Z78.9 IMPAIRED MOBILITY AND ADLS: ICD-10-CM

## 2017-11-14 DIAGNOSIS — M25.639 DECREASED ROM OF WRIST: ICD-10-CM

## 2017-11-14 DIAGNOSIS — M25.532 LEFT WRIST PAIN: ICD-10-CM

## 2017-11-14 PROCEDURE — 97110 THERAPEUTIC EXERCISES: CPT | Mod: PO

## 2017-11-14 PROCEDURE — 97140 MANUAL THERAPY 1/> REGIONS: CPT | Mod: PO

## 2017-11-14 PROCEDURE — 97035 APP MDLTY 1+ULTRASOUND EA 15: CPT | Mod: PO

## 2017-11-14 PROCEDURE — 97018 PARAFFIN BATH THERAPY: CPT | Mod: PO

## 2017-11-14 NOTE — Clinical Note
Pt. Sees you tomorrow. Attached note has current ROM measures. Flexion is area needing most attention. She is improving in all other areas. OT needs to continue. Sendy Mai OT is primary therapist.  Thanks, Krysta

## 2017-11-14 NOTE — PROGRESS NOTES
"OT Daily Progress Note    Patient:  Marilee DONOVAN [facility] #:  [id number]   Date of Note: 11/14/2017   Referring Physician:  Gary Regan Jr., *  Diagnosis:  S/P Distal Radius ORIF  Encounter Diagnoses   Name Primary?    Left wrist pain     Decreased ROM of wrist     Impaired mobility and ADLs         Start Time: 10  End Time: 11  Total Time: 60 min  Group Time: 0    Visit 6 of 20 authorized    Number of Cancellations: 0  Number of No Shows: 0    Subjective:   "I feel like the swelling is impeding my motion"  Pain: 4 out of 10     Objective:     Wrist ROM: left    EVAL 11/14/17   Flexion 43 35   Extension 27 35   Radial Deviation 15 16   Ulnar Deviation 20 30   Supination 70 88   Pronation 75 90       Patient seen by OT this session. Treatment  consist of the following:  Paraffin,  Ultrasound, manual therapy/joint mobilization, Therapeutic exercises,   Paraffin: 10 minutes with flexed wrist to increase blood flow, circulation, pain management and for tissue elasticity prior to therex.   US:  L  scar to increase flow, circulation, tissue elasticity, pain management and for wound/scar management for 8 minutes @ 3.3 Mhz, Intensity 0.6 w/cm2 at 100* duty cycle.  Manual therapy:  Performed scar massage to L volar wrist area for 10 minutes to decrease adhesions and improve tensile glide. Pt. Instructed on self massage for home.  Therapuetic exercises: juxtaciser, Received light PROM and LLPS in wrist flexion x 3 sets of 15 secs.     Assessment:  Pt will continue to benefit from skilled OT intervention.  Tolerated all treatment well today with no adverse effects.  Still stiffness noted with all movements. Edema controlled. Verbalized understanding with putty program. Minimal soreness reported but pt cotinues to  improve daily. Compliant with HEP. Patient continues to demonstrate limitation  with  ROM, Joint mobility, Stiffness, Decreased functional use, Decreased strength, Continued pain and Continued inflammation " all of which interfere with pt's vocational and leisurely pursuits.         New/Revised Goals: Continue POC  1)   Patient to be IND with HEP and modalities for pain management  2)   Increase ROM 10 degrees in all wrist planes of motion to increase functional hand use for dressing.  3)   Assess  and pinch.   4)   Decrease edema .2-.3 cm to increase joint mobility /flexibility for functional hand use.   5)   Patient to score at 57% or more on FOTO to demonstrate improved perception of functional LUE use.      Plan:  Continue 2x week x 6 weeks  during the certification period,.

## 2017-11-15 ENCOUNTER — HOSPITAL ENCOUNTER (OUTPATIENT)
Dept: RADIOLOGY | Facility: OTHER | Age: 57
Discharge: HOME | End: 2017-11-15
Attending: ORTHOPAEDIC SURGERY
Payer: COMMERCIAL

## 2017-11-15 ENCOUNTER — PATIENT MESSAGE (OUTPATIENT)
Dept: INTERNAL MEDICINE | Facility: CLINIC | Age: 57
End: 2017-11-15

## 2017-11-15 ENCOUNTER — OFFICE VISIT (OUTPATIENT)
Dept: ORTHOPEDICS | Facility: CLINIC | Age: 57
End: 2017-11-15
Attending: ORTHOPAEDIC SURGERY
Payer: COMMERCIAL

## 2017-11-15 VITALS — HEIGHT: 64 IN | BODY MASS INDEX: 25.61 KG/M2 | WEIGHT: 150 LBS

## 2017-11-15 DIAGNOSIS — M25.539 PAIN IN WRIST, UNSPECIFIED LATERALITY: ICD-10-CM

## 2017-11-15 DIAGNOSIS — S52.532D CLOSED COLLES' FRACTURE OF LEFT RADIUS WITH ROUTINE HEALING, SUBSEQUENT ENCOUNTER: ICD-10-CM

## 2017-11-15 DIAGNOSIS — M25.539 PAIN IN WRIST, UNSPECIFIED LATERALITY: Primary | ICD-10-CM

## 2017-11-15 PROCEDURE — 99999 PR PBB SHADOW E&M-EST. PATIENT-LVL III: CPT | Mod: PBBFAC,,, | Performed by: ORTHOPAEDIC SURGERY

## 2017-11-15 PROCEDURE — 73100 X-RAY EXAM OF WRIST: CPT | Mod: TC,LT

## 2017-11-15 PROCEDURE — 99024 POSTOP FOLLOW-UP VISIT: CPT | Mod: S$GLB,,, | Performed by: ORTHOPAEDIC SURGERY

## 2017-11-15 PROCEDURE — 73100 X-RAY EXAM OF WRIST: CPT | Mod: 26,LT,, | Performed by: RADIOLOGY

## 2017-11-15 RX ORDER — DICLOFENAC SODIUM 10 MG/G
2 GEL TOPICAL 2 TIMES DAILY
Qty: 1 TUBE | Refills: 3 | Status: SHIPPED | OUTPATIENT
Start: 2017-11-15 | End: 2017-12-20 | Stop reason: SDUPTHER

## 2017-11-15 NOTE — PROGRESS NOTES
HISTORY OF PRESENT ILLNESS:  Ms. Rice is about 7 weeks out from ORIF of   left distal radius fracture.  She is currently in therapy, making some progress,   still having some pain, but improving.  Therapist has recommended another month   of therapy and I agree.    PHYSICAL EXAMINATION:  LEFT HAND AND WRIST:  The volar incision looks good, well healed.  No evidence   of infection.  Minimal swelling.  Minimal tenderness.  Range of motion is   limited, however.  She has limited flexion to only about 20 degrees and   extension about 45.  Good pronation and supination.    PLAN:  I will order some more therapy for her and also I want her to discontinue   the brace that may help with some of her movement as well.  Increase activities   as tolerated.    X-rays AP and lateral of left wrist demonstrate almost completely healed distal   radius fracture, good position.    We may also try her on some Voltaren gel topically, especially since she has   some stomach issues related to aspirin.  Follow up in 1 month.      GERRY/IN  dd: 11/15/2017 14:40:36 (CST)  td: 11/16/2017 09:31:25 (CST)  Doc ID   #[id number]  Job ID #[id number]    CC:

## 2017-11-16 ENCOUNTER — CLINICAL SUPPORT (OUTPATIENT)
Dept: REHABILITATION | Facility: HOSPITAL | Age: 57
End: 2017-11-16
Attending: ORTHOPAEDIC SURGERY
Payer: COMMERCIAL

## 2017-11-16 DIAGNOSIS — Z74.09 IMPAIRED MOBILITY AND ADLS: ICD-10-CM

## 2017-11-16 DIAGNOSIS — M25.639 DECREASED ROM OF WRIST: ICD-10-CM

## 2017-11-16 DIAGNOSIS — Z78.9 IMPAIRED MOBILITY AND ADLS: ICD-10-CM

## 2017-11-16 DIAGNOSIS — M25.532 LEFT WRIST PAIN: ICD-10-CM

## 2017-11-16 PROCEDURE — 97110 THERAPEUTIC EXERCISES: CPT | Mod: PO

## 2017-11-16 PROCEDURE — 97018 PARAFFIN BATH THERAPY: CPT | Mod: PO

## 2017-11-16 PROCEDURE — 97035 APP MDLTY 1+ULTRASOUND EA 15: CPT | Mod: PO

## 2017-11-16 RX ORDER — MONTELUKAST SODIUM 10 MG/1
10 TABLET ORAL NIGHTLY
Qty: 90 TABLET | Refills: 3 | Status: SHIPPED | OUTPATIENT
Start: 2017-11-16 | End: 2018-11-07 | Stop reason: SDUPTHER

## 2017-11-16 NOTE — PROGRESS NOTES
"OT Daily Progress Note    Patient:  Marilee DONOVAN [facility] #:  [id number]   Date of Note: 11/16/2017   Referring Physician:  Gary Regan Jr., *  Diagnosis:  S/P Distal Radius ORIF  Encounter Diagnoses   Name Primary?    Left wrist pain     Decreased ROM of wrist     Impaired mobility and ADLs         Start Time: 1:00  End Time: 1:45   Total Time: 45 min  Group Time: 0    Visit 7 of 20 authorized    Number of Cancellations: 0  Number of No Shows: 0    Subjective:   "It feels pretty good today." pt reports compliance with HEP   Pain: 2 out of 10 - pain appears to increase with LLPS as noted in pt's facial expressions     Objective:   Patient seen by OT this session. Treatment  consist of the following:  Paraffin, Fluidotherapy, Ultrasound, manual therapy/joint mobilization, Therapeutic exercises, Joint protection and Entergy conservation     Patient received paraffin bath to L hand(s) for 10 minutes to increase blood flow, circulation, pain management and for tissue elasticity prior to therex. Patient received ultrasound to  L area to increase blood flow, circulation, tissue elasticity, pain management and for wound/scar management for 8 minutes @ 3.3 Mhz, Intensity 0.6 w/cm2 at 100% duty cycle. Pt was instructed on and performed the following therapuetic exercises: juxtaciser x 2 min, hammer stretch for supination/pronation x 15 reps, yellow putty gross squeezes x 20 reps; exercises performed for stretching, ROM, and strengthening.  Received light PROM and LLPS /c 2# weight in wrist flexion x 4 sets of 20 secs.       Assessment:  Pt will continue to benefit from skilled OT intervention.  Tolerated all treatment well today with no adverse effects.  Still stiffness noted with all movements; wrist flexion most limited range; demonstrates good supination/pronation with hammer stretch.  Edema controlled. Verbalized understanding with putty program. Minimal soreness reported but pt cotinues to  improve daily. " Compliant with HEP. Patient continues to demonstrate limitation  with  ROM, Joint mobility, Stiffness, Decreased functional use, Decreased strength, Continued pain and Continued inflammation all of which interfere with pt's vocational and leisurely pursuits.     New/Revised Goals: Continue POC  1)   Patient to be IND with HEP and modalities for pain management  2)   Increase ROM 10 degrees in all wrist planes of motion to increase functional hand use for dressing.  3)   Assess  and pinch.   4)   Decrease edema .2-.3 cm to increase joint mobility /flexibility for functional hand use.   5)   Patient to score at 57% or more on FOTO to demonstrate improved perception of functional LUE use.      Plan:  POC extended: 2 times weekly for 4 weeks to continue working towards goals.

## 2017-11-21 ENCOUNTER — CLINICAL SUPPORT (OUTPATIENT)
Dept: REHABILITATION | Facility: HOSPITAL | Age: 57
End: 2017-11-21
Attending: ORTHOPAEDIC SURGERY
Payer: COMMERCIAL

## 2017-11-21 DIAGNOSIS — Z74.09 IMPAIRED MOBILITY AND ADLS: ICD-10-CM

## 2017-11-21 DIAGNOSIS — M25.639 DECREASED ROM OF WRIST: ICD-10-CM

## 2017-11-21 DIAGNOSIS — Z78.9 IMPAIRED MOBILITY AND ADLS: ICD-10-CM

## 2017-11-21 DIAGNOSIS — M25.532 LEFT WRIST PAIN: ICD-10-CM

## 2017-11-21 PROCEDURE — 97018 PARAFFIN BATH THERAPY: CPT | Mod: PO

## 2017-11-21 PROCEDURE — 97110 THERAPEUTIC EXERCISES: CPT | Mod: PO

## 2017-11-21 PROCEDURE — 97140 MANUAL THERAPY 1/> REGIONS: CPT | Mod: PO

## 2017-11-21 NOTE — PROGRESS NOTES
"OT Daily Progress Note    Patient:  Marilee DONOVAN [facility] #:  [id number]   Date of Note: 11/21/2017   Referring Physician:  Gary Regan Jr., *  Diagnosis:  S/P Distal Radius ORIF  Encounter Diagnoses   Name Primary?    Left wrist pain     Decreased ROM of wrist     Impaired mobility and ADLs         Start Time: 1:00  End Time: 2pm  Total Time: 60 min  Group Time: 0    Visit 8 of 20 authorized    Number of Cancellations: 0  Number of No Shows: 0    Subjective:   "It feels pretty good today." pt reports compliance with HEP   Pain: 2 out of 10 - pain appears to increase with LLPS as noted in pt's facial expressions     Objective:   Patient seen by OT this session. Treatment  consist of the following:  Paraffin, Fluidotherapy, Ultrasound, manual therapy/joint mobilization, Therapeutic exercises, Joint protection and Entergy conservation     Patient received paraffin bath to L hand(s) for 10 minutes to increase blood flow, circulation, pain management and for tissue elasticity prior to therex. Patient received ultrasound to  L area to increase blood flow, circulation, tissue elasticity, pain management and for wound/scar management for 8 minutes @ 3.3 Mhz, Intensity 0.6 w/cm2 at 100% duty cycle. Pt was instructed on and performed the following therapuetic exercises: juxtaciser x 2 min, hammer stretch for supination/pronation x 15 reps, yellow putty gross squeezes x 20 reps; exercises performed for stretching, ROM, and strengthening.  Received light PROM and LLPS /c 2# weight in wrist flexion x 4 sets of 20 secs.       Assessment:  Pt will continue to benefit from skilled OT intervention.  Tolerated all treatment well today with no adverse effects.  Still stiffness noted with all movements; wrist flexion most limited range; demonstrates good supination/pronation with hammer stretch. Added flexor stretches to HEP and pt demo in clinic prior to leaving today. Edema controlled. Verbalized understanding with putty " program. Minimal soreness reported but pt cotinues to  improve daily. Compliant with HEP. Patient continues to demonstrate limitation  with  ROM, Joint mobility, Stiffness, Decreased functional use, Decreased strength, Continued pain and Continued inflammation all of which interfere with pt's vocational and leisurely pursuits.     New/Revised Goals: Continue POC  1)   Patient to be IND with HEP and modalities for pain management  2)   Increase ROM 10 degrees in all wrist planes of motion to increase functional hand use for dressing.  3)   Assess  and pinch.   4)   Decrease edema .2-.3 cm to increase joint mobility /flexibility for functional hand use.   5)   Patient to score at 57% or more on FOTO to demonstrate improved perception of functional LUE use.      Plan:  POC extended: 2 times weekly for 4 weeks to continue working towards goals.

## 2017-11-28 ENCOUNTER — CLINICAL SUPPORT (OUTPATIENT)
Dept: REHABILITATION | Facility: HOSPITAL | Age: 57
End: 2017-11-28
Attending: ORTHOPAEDIC SURGERY
Payer: COMMERCIAL

## 2017-11-28 DIAGNOSIS — M25.639 DECREASED ROM OF WRIST: ICD-10-CM

## 2017-11-28 DIAGNOSIS — Z78.9 IMPAIRED MOBILITY AND ADLS: ICD-10-CM

## 2017-11-28 DIAGNOSIS — M25.532 LEFT WRIST PAIN: ICD-10-CM

## 2017-11-28 DIAGNOSIS — Z74.09 IMPAIRED MOBILITY AND ADLS: ICD-10-CM

## 2017-11-28 PROCEDURE — 97018 PARAFFIN BATH THERAPY: CPT | Mod: PO

## 2017-11-28 PROCEDURE — 97110 THERAPEUTIC EXERCISES: CPT | Mod: PO

## 2017-11-28 PROCEDURE — 97035 APP MDLTY 1+ULTRASOUND EA 15: CPT | Mod: PO

## 2017-11-28 NOTE — PROGRESS NOTES
"OT Daily Progress Note    Patient:  Marilee DONOVAN [facility] #:  [id number]   Date of Note: 11/28/2017   Referring Physician:  Gary Regan Jr., *  Diagnosis:  S/P Distal Radius ORIF  Encounter Diagnoses   Name Primary?    Left wrist pain     Decreased ROM of wrist     Impaired mobility and ADLs         Start Time: 1100  End Time: 1147  Total Time: 47 min  Group Time: 0    Visit 9 of 20 authorized    Number of Cancellations: 0  Number of No Shows: 0    Subjective:   "It's pretty sore today. I was in the kitchen all weekend cooking and holding pots and pans and then I crocheted a lot, so I think I just over did it. I iced and it felt better, but then I did my putty exercises yesterday so that made it sore again."     Pain: 7 out of 10 L wrist     Objective:   Patient seen by OT this session. Treatment  consist of the following:  Paraffin, Fluidotherapy, Ultrasound, manual therapy/joint mobilization, Therapeutic exercises, Joint protection and Entergy conservation     Patient received paraffin bath to L hand(s) for 10 minutes to increase blood flow, circulation, pain management and for tissue elasticity prior to therex. Patient received ultrasound to L scar area to increase blood flow, circulation, tissue elasticity, pain management and for wound/scar management for 8 minutes @ 3.3 Mhz, Intensity 0.6 w/cm2 at 100% duty cycle. Pt was instructed on and performed the following therapuetic exercises: juxtaciser x 2 min, hammer stretch for supination/pronation x 2 min, yellow putty gross squeezes x 20 reps; exercises performed for stretching, ROM, and strengthening.  Performed 2# free weight for L wrist flexion/extension (2 positions), RD/UD, supination/pronation x 15 reps x 2 sets each for wrist strengthening. Received light PROM and LLPS /c 2# weight in wrist flexion x 4 sets of 30 secs.       Assessment:  Pt will continue to benefit from skilled OT intervention.  Tolerated all treatment well today with no adverse " effects.  Still stiffness noted with all movements; wrist flexion most limited range; demonstrates good supination/pronation with hammer stretch. Pt reports compliance with flexor stretches added to HEP. Wrist flexion gradually improving, progress is slow but steady. Edema controlled. Verbalized understanding with putty program. Increased soreness reported this date due to increased activity over the weekend, but pt cotinues to functionally improve daily and soreness improves after icing wrist per subjective report. Compliant with HEP. Patient continues to demonstrate limitation  with  ROM, Joint mobility, Stiffness, Decreased functional use, Decreased strength, Continued pain and Continued inflammation all of which interfere with pt's vocational and leisurely pursuits.     New/Revised Goals: Continue POC  1)   Patient to be IND with HEP and modalities for pain management  2)   Increase ROM 10 degrees in all wrist planes of motion to increase functional hand use for dressing.  3)   Assess  and pinch.   4)   Decrease edema .2-.3 cm to increase joint mobility /flexibility for functional hand use.   5)   Patient to score at 57% or more on FOTO to demonstrate improved perception of functional LUE use.      Plan:  POC extended: 2 times weekly for 4 weeks to continue working towards goals.

## 2017-11-30 ENCOUNTER — CLINICAL SUPPORT (OUTPATIENT)
Dept: REHABILITATION | Facility: HOSPITAL | Age: 57
End: 2017-11-30
Attending: ORTHOPAEDIC SURGERY
Payer: COMMERCIAL

## 2017-11-30 ENCOUNTER — PATIENT MESSAGE (OUTPATIENT)
Dept: INTERNAL MEDICINE | Facility: CLINIC | Age: 57
End: 2017-11-30

## 2017-11-30 DIAGNOSIS — Z78.9 IMPAIRED MOBILITY AND ADLS: ICD-10-CM

## 2017-11-30 DIAGNOSIS — M25.639 DECREASED ROM OF WRIST: ICD-10-CM

## 2017-11-30 DIAGNOSIS — M25.532 LEFT WRIST PAIN: ICD-10-CM

## 2017-11-30 DIAGNOSIS — Z74.09 IMPAIRED MOBILITY AND ADLS: ICD-10-CM

## 2017-11-30 PROCEDURE — 97110 THERAPEUTIC EXERCISES: CPT | Mod: PO

## 2017-11-30 PROCEDURE — 97018 PARAFFIN BATH THERAPY: CPT | Mod: PO

## 2017-11-30 NOTE — PROGRESS NOTES
"OT Daily Progress Note    Patient:  Marilee DONOVAN [facility] #:  [id number]   Date of Note: 11/30/2017   Referring Physician:  Gary Regan Jr., *  Diagnosis:  S/P Distal Radius ORIF  Encounter Diagnoses   Name Primary?    Left wrist pain     Decreased ROM of wrist     Impaired mobility and ADLs         Start Time: 1100  End Time: 1147  Total Time: 47 min  Group Time: 0    Visit 9 of 20 authorized    Number of Cancellations: 0  Number of No Shows: 0    Subjective:   "It's pretty sore today. I was in the kitchen all weekend cooking and holding pots and pans and then I crocheted a lot, so I think I just over did it. I iced and it felt better, but then I did my putty exercises yesterday so that made it sore again."     Pain: 7 out of 10 L wrist     Objective:   Patient seen by OT this session. Treatment  consist of the following:  Paraffin, Fluidotherapy, Ultrasound, manual therapy/joint mobilization, Therapeutic exercises, Joint protection and Entergy conservation     Patient received paraffin bath to L hand(s) for 10 minutes to increase blood flow, circulation, pain management and for tissue elasticity prior to therex. Patient received ultrasound to L scar area to increase blood flow, circulation, tissue elasticity, pain management and for wound/scar management for 8 minutes @ 3.3 Mhz, Intensity 0.6 w/cm2 at 100% duty cycle. Pt was instructed on and performed the following therapuetic exercises: juxtaciser x 2 min, hammer stretch for supination/pronation x 2 min, yellow putty gross squeezes x 20 reps; exercises performed for stretching, ROM, and strengthening.  Performed 2# free weight for L wrist flexion/extension (2 positions), RD/UD, supination/pronation x 15 reps x 2 sets each for wrist strengthening. Received light PROM and LLPS /c 2# weight in wrist flexion x 4 sets of 30 secs. Patient performed puttycisers jar, bottle top, and key for hand strengthening x 10 reps each with red putty. "       Assessment:  Pt will continue to benefit from skilled OT intervention.  Tolerated all treatment well today with no adverse effects.  Obvious tremorrs noted with putty tools used today particularly jar and key simluator. Pt arrived with more flexion than in previous sessions. Confinues use of hand for functional tasks at home but continues to require extended time. Patient continues to demonstrate limitation  with  ROM, Joint mobility, Stiffness, Decreased functional use, Decreased strength, Continued pain and Continued inflammation all of which interfere with pt's vocational and leisurely pursuits.     New/Revised Goals: Continue POC  1)   Patient to be IND with HEP and modalities for pain management  2)   Increase ROM 10 degrees in all wrist planes of motion to increase functional hand use for dressing.  3)   Assess  and pinch.   4)   Decrease edema .2-.3 cm to increase joint mobility /flexibility for functional hand use.   5)   Patient to score at 57% or more on FOTO to demonstrate improved perception of functional LUE use.      Plan:  POC extended: 2 times weekly for 4 weeks to continue working towards goals.

## 2017-12-05 ENCOUNTER — CLINICAL SUPPORT (OUTPATIENT)
Dept: REHABILITATION | Facility: HOSPITAL | Age: 57
End: 2017-12-05
Attending: ORTHOPAEDIC SURGERY
Payer: COMMERCIAL

## 2017-12-05 ENCOUNTER — TELEPHONE (OUTPATIENT)
Dept: INTERNAL MEDICINE | Facility: CLINIC | Age: 57
End: 2017-12-05

## 2017-12-05 DIAGNOSIS — Z78.9 IMPAIRED MOBILITY AND ADLS: ICD-10-CM

## 2017-12-05 DIAGNOSIS — M25.639 DECREASED ROM OF WRIST: ICD-10-CM

## 2017-12-05 DIAGNOSIS — M25.532 LEFT WRIST PAIN: ICD-10-CM

## 2017-12-05 DIAGNOSIS — Z74.09 IMPAIRED MOBILITY AND ADLS: ICD-10-CM

## 2017-12-05 PROCEDURE — 97140 MANUAL THERAPY 1/> REGIONS: CPT | Mod: PO

## 2017-12-05 PROCEDURE — 97110 THERAPEUTIC EXERCISES: CPT | Mod: PO

## 2017-12-05 PROCEDURE — 97018 PARAFFIN BATH THERAPY: CPT | Mod: PO

## 2017-12-05 NOTE — PROGRESS NOTES
"OT Daily Progress Note    Patient:  Marilee DONOVAN [facility] #:  [id number]   Date of Note: 12/05/2017   Referring Physician:  Gary Regan Jr., *  Diagnosis:  S/P Distal Radius ORIF  Encounter Diagnoses   Name Primary?    Left wrist pain     Decreased ROM of wrist     Impaired mobility and ADLs         Start Time: 1100  End Time: 1147  Total Time: 47 min  Group Time: 0    Visit 10 of 20 authorized    Number of Cancellations: 0  Number of No Shows: 0    Subjective:   "It's getting a lot better but sometimes it hurts to  things"   Pain: 4 out of 10 L wrist     Objective:   Patient seen by OT this session. Treatment  consist of the following:  Paraffin, Fluidotherapy, Ultrasound, manual therapy/joint mobilization, Therapeutic exercises, Joint protection and Entergy conservation     Patient received paraffin bath to L hand(s) for 10 minutes to increase blood flow, circulation, pain management and for tissue elasticity prior to therex. Patient received ultrasound to L scar area to increase blood flow, circulation, tissue elasticity, pain management and for wound/scar management for 8 minutes @ 3.3 Mhz, Intensity 0.6 w/cm2 at 100% duty cycle. Pt was instructed on and performed the following therapuetic exercises: juxtaciser x 2 min, hammer stretch for supination/pronation x 2 min, yellow putty gross squeezes x 20 reps; exercises performed for stretching, ROM, and strengthening.  Performed 2# free weight for L wrist flexion/extension (2 positions), RD/UD, supination/pronation x 15 reps x 2 sets each for wrist strengthening. Received light PROM and LLPS /c 2# weight in wrist flexion x 4 sets of 30 secs. Patient performed puttycisers jar, bottle top, and key for hand strengthening x 10 reps each with red putty.       Assessment:  Pt will continue to benefit from skilled OT intervention.  Tolerated all treatment well today with no adverse effects. Pt arrived with more flexion than in previous sessions, " specifically 50 degrees. Confinues use of hand for functional tasks at home but continues to require extended time. Patient continues to demonstrate limitation  with  ROM, Joint mobility, Stiffness, Decreased functional use, Decreased strength, Continued pain and Continued inflammation all of which interfere with pt's vocational and leisurely pursuits.     New/Revised Goals: Continue POC  1)   Patient to be IND with HEP and modalities for pain management  2)   Increase ROM 10 degrees in all wrist planes of motion to increase functional hand use for dressing.  3)   Assess  and pinch.   4)   Decrease edema .2-.3 cm to increase joint mobility /flexibility for functional hand use.   5)   Patient to score at 57% or more on FOTO to demonstrate improved perception of functional LUE use.      Plan:  POC extended: 2 times weekly for 4 weeks to continue working towards goals.

## 2017-12-05 NOTE — TELEPHONE ENCOUNTER
I thought I had sent note that her biopsy was equivocal result, and they did not have enough material to actually say that it was definitively neg.  I would favor recheck her thyroid exam in 3-4 mo and see if this is shrinking and then repeat scan in 6 mo to figure out if she should have another biopsy

## 2017-12-05 NOTE — TELEPHONE ENCOUNTER
11/6/17 thyroid biopsy results:    FINAL PATHOLOGIC DIAGNOSIS  1. Thyroid nodule, right, fine-needle aspiration:  The [place] System for Reporting Thyroid Cytopathology category: I, Nondiagnostic or unsatisfactory.  Scant colloid and rare follicular epithelial cells, insufficient for evaluation.  Diagnosed by: Silvestre Flores M.D.      What should I tell patient?    Please advise.  Thanks jami

## 2017-12-06 ENCOUNTER — PATIENT MESSAGE (OUTPATIENT)
Dept: INTERNAL MEDICINE | Facility: CLINIC | Age: 57
End: 2017-12-06

## 2017-12-06 DIAGNOSIS — E01.0 THYROMEGALY: Primary | ICD-10-CM

## 2017-12-07 ENCOUNTER — CLINICAL SUPPORT (OUTPATIENT)
Dept: REHABILITATION | Facility: HOSPITAL | Age: 57
End: 2017-12-07
Attending: ORTHOPAEDIC SURGERY
Payer: COMMERCIAL

## 2017-12-07 DIAGNOSIS — Z74.09 IMPAIRED MOBILITY AND ADLS: ICD-10-CM

## 2017-12-07 DIAGNOSIS — M25.639 DECREASED ROM OF WRIST: ICD-10-CM

## 2017-12-07 DIAGNOSIS — Z78.9 IMPAIRED MOBILITY AND ADLS: ICD-10-CM

## 2017-12-07 DIAGNOSIS — M25.532 LEFT WRIST PAIN: ICD-10-CM

## 2017-12-07 PROCEDURE — 97110 THERAPEUTIC EXERCISES: CPT | Mod: PO

## 2017-12-07 PROCEDURE — 97140 MANUAL THERAPY 1/> REGIONS: CPT | Mod: PO

## 2017-12-07 PROCEDURE — 97018 PARAFFIN BATH THERAPY: CPT | Mod: PO

## 2017-12-07 PROCEDURE — 97035 APP MDLTY 1+ULTRASOUND EA 15: CPT | Mod: PO

## 2017-12-12 ENCOUNTER — CLINICAL SUPPORT (OUTPATIENT)
Dept: REHABILITATION | Facility: HOSPITAL | Age: 57
End: 2017-12-12
Attending: ORTHOPAEDIC SURGERY
Payer: COMMERCIAL

## 2017-12-12 DIAGNOSIS — M25.532 LEFT WRIST PAIN: ICD-10-CM

## 2017-12-12 DIAGNOSIS — Z78.9 IMPAIRED MOBILITY AND ADLS: ICD-10-CM

## 2017-12-12 DIAGNOSIS — Z74.09 IMPAIRED MOBILITY AND ADLS: ICD-10-CM

## 2017-12-12 DIAGNOSIS — M25.639 DECREASED ROM OF WRIST: ICD-10-CM

## 2017-12-12 PROCEDURE — 97110 THERAPEUTIC EXERCISES: CPT | Mod: PO

## 2017-12-12 PROCEDURE — 97018 PARAFFIN BATH THERAPY: CPT | Mod: PO

## 2017-12-13 NOTE — PROGRESS NOTES
"OT Daily Progress Note    Patient:  Marilee DONOVAN [facility] #:  [id number]   Date of Note: 12/13/2017   Referring Physician:  Gary Regan Jr., *  Diagnosis:  S/P Distal Radius ORIF  Encounter Diagnoses   Name Primary?    Left wrist pain     Decreased ROM of wrist     Impaired mobility and ADLs         Start Time: 11  End Time: 12  Total Time: 60 min  Group Time: 0    Visit 14 of 20 authorized    Number of Cancellations: 0  Number of No Shows: 0    Subjective:   "It's getting a lot better but I am still weak. I was tired over the weekend so I didn't do much"   Pain: 4 out of 10 L wrist     Objective:   Patient seen by OT this session. Treatment  consist of the following:  Paraffin, Fluidotherapy, Ultrasound, manual therapy/joint mobilization, Therapeutic exercises, Joint protection and Entergy conservation     Patient received paraffin bath to L hand(s) for 10 minutes to increase blood flow, circulation, pain management and for tissue elasticity prior to therex. Patient received ultrasound to L scar area to increase blood flow, circulation, tissue elasticity, pain management and for wound/scar management for 8 minutes @ 3.3 Mhz, Intensity 0.6 w/cm2 at 100% duty cycle. Pt was instructed on and performed the following therapuetic exercises: juxtaciser x 2 min, hammer stretch for supination/pronation x 2 min, yellow putty gross squeezes x 20 reps; exercises performed for stretching, ROM, and strengthening.  Performed 2# free weight for L wrist flexion/extension (2 positions), RD/UD, supination/pronation x 15 reps x 2 sets each for wrist strengthening. Received light PROM and LLPS /c 2# weight in wrist flexion x 4 sets of 30 secs.       Assessment:  Pt will continue to benefit from skilled OT intervention.  Tolerated all treatment well today with no adverse effects. Pt arrived with more flexion than in previous sessions, specifically 50 degrees. Confinues use of hand for functional tasks at home but continues to " require extended time. Still with some tremors with 2# wrist weight evident of decreased strengthening. Patient continues to demonstrate limitation  with  ROM, Joint mobility, Stiffness, Decreased functional use, Decreased strength, Continued pain and Continued inflammation all of which interfere with pt's vocational and leisurely pursuits.     New/Revised Goals: Continue POC  1)   Patient to be IND with HEP and modalities for pain management  2)   Increase ROM 10 degrees in all wrist planes of motion to increase functional hand use for dressing.  3)   Assess  and pinch.   4)   Decrease edema .2-.3 cm to increase joint mobility /flexibility for functional hand use.   5)   Patient to score at 57% or more on FOTO to demonstrate improved perception of functional LUE use.      Plan:  POC extended: 2 times weekly for 4 weeks to continue working towards goals.

## 2017-12-14 ENCOUNTER — CLINICAL SUPPORT (OUTPATIENT)
Dept: REHABILITATION | Facility: HOSPITAL | Age: 57
End: 2017-12-14
Attending: ORTHOPAEDIC SURGERY
Payer: COMMERCIAL

## 2017-12-14 DIAGNOSIS — Z74.09 IMPAIRED MOBILITY AND ADLS: ICD-10-CM

## 2017-12-14 DIAGNOSIS — M25.532 LEFT WRIST PAIN: ICD-10-CM

## 2017-12-14 DIAGNOSIS — Z78.9 IMPAIRED MOBILITY AND ADLS: ICD-10-CM

## 2017-12-14 DIAGNOSIS — M25.639 DECREASED ROM OF WRIST: ICD-10-CM

## 2017-12-14 PROCEDURE — 97140 MANUAL THERAPY 1/> REGIONS: CPT | Mod: PO

## 2017-12-14 PROCEDURE — 97110 THERAPEUTIC EXERCISES: CPT | Mod: PO

## 2017-12-14 PROCEDURE — 97018 PARAFFIN BATH THERAPY: CPT | Mod: PO

## 2017-12-14 NOTE — PROGRESS NOTES
"OT Daily Progress Note    Patient:  Marilee DONOVAN [facility] #:  [id number]   Date of Note: 12/14/2017   Referring Physician:  Gary Regan Jr., *  Diagnosis:  S/P Distal Radius ORIF  Encounter Diagnoses   Name Primary?    Left wrist pain     Decreased ROM of wrist     Impaired mobility and ADLs         Start Time: 11  End Time: 12  Total Time: 60 min  Group Time: 0    Visit 15 of 20 authorized    Number of Cancellations: 0  Number of No Shows: 0    Subjective:   "I feel like I really turned a corner everything is feeling good"  Pain: 4 out of 10 L wrist     Objective:   Patient seen by OT this session. Treatment  consist of the following:  Paraffin, Fluidotherapy, Ultrasound, manual therapy/joint mobilization, Therapeutic exercises, Joint protection and Entergy conservation     Patient received paraffin bath to L hand(s) for 10 minutes to increase blood flow, circulation, pain management and for tissue elasticity prior to therex. Pt was instructed on and performed the following therapuetic exercises: juxtaciser x 2 min, hammer stretch for supination/pronation x 2 min, yellow putty gross squeezes x 20 reps; exercises performed for stretching, ROM, and strengthening.  Performed 2# free weight for L wrist flexion/extension (2 positions), RD/UD, supination/pronation x 15 reps x 2 sets each for wrist strengthening. Received light PROM and LLPS /c 3# weight in wrist flexion x 4 sets of 30 secs.       Assessment:  Pt will continue to benefit from skilled OT intervention.  Tolerated all treatment well today with no adverse effects. Pt arrived with more flexion than in previous sessions, specifically 50 degrees. Confinues use of hand for functional tasks at home but continues to require extended time. Still with some tremors with 2# wrist weight evident of decreased strengthening. Patient continues to demonstrate limitation  with  ROM, Joint mobility, Stiffness, Decreased functional use, Decreased strength, Continued " pain and Continued inflammation all of which interfere with pt's vocational and leisurely pursuits.     New/Revised Goals: Continue POC  1)   Patient to be IND with HEP and modalities for pain management  2)   Increase ROM 10 degrees in all wrist planes of motion to increase functional hand use for dressing.  3)   Assess  and pinch.   4)   Decrease edema .2-.3 cm to increase joint mobility /flexibility for functional hand use.   5)   Patient to score at 57% or more on FOTO to demonstrate improved perception of functional LUE use.      Plan:  POC extended: 2 times weekly for 4 weeks to continue working towards goals.

## 2017-12-19 ENCOUNTER — CLINICAL SUPPORT (OUTPATIENT)
Dept: REHABILITATION | Facility: HOSPITAL | Age: 57
End: 2017-12-19
Attending: ORTHOPAEDIC SURGERY
Payer: COMMERCIAL

## 2017-12-19 DIAGNOSIS — M25.532 LEFT WRIST PAIN: ICD-10-CM

## 2017-12-19 DIAGNOSIS — Z74.09 IMPAIRED MOBILITY AND ADLS: ICD-10-CM

## 2017-12-19 DIAGNOSIS — Z78.9 IMPAIRED MOBILITY AND ADLS: ICD-10-CM

## 2017-12-19 DIAGNOSIS — M25.639 DECREASED ROM OF WRIST: ICD-10-CM

## 2017-12-19 PROCEDURE — 97018 PARAFFIN BATH THERAPY: CPT | Mod: PO

## 2017-12-19 PROCEDURE — 97110 THERAPEUTIC EXERCISES: CPT | Mod: PO

## 2017-12-19 PROCEDURE — 97140 MANUAL THERAPY 1/> REGIONS: CPT | Mod: PO

## 2017-12-19 NOTE — PROGRESS NOTES
"OT Daily Progress Note    Patient:  Marilee DONOVAN [facility] #:  [id number]   Date of Note: 12/19/2017   Referring Physician:  Gary Regan Jr., *  Diagnosis:  S/P Distal Radius ORIF  Encounter Diagnoses   Name Primary?    Left wrist pain     Decreased ROM of wrist     Impaired mobility and ADLs         Start Time: 11  End Time: 12  Total Time: 60 min  Group Time: 0    Visit 16 of 20 authorized    Number of Cancellations: 0  Number of No Shows: 0    Subjective:   "Everything is feeling good"  Pain: 4 out of 10 L wrist     Objective:   Patient seen by OT this session. Treatment  consist of the following:  Paraffin, Fluidotherapy, Ultrasound, manual therapy/joint mobilization, Therapeutic exercises, Joint protection and Entergy conservation     Patient received paraffin bath to L hand(s) for 10 minutes to increase blood flow, circulation, pain management and for tissue elasticity prior to therex. Pt was instructed on and performed the following therapuetic exercises: juxtaciser x 2 min, hammer stretch for supination/pronation x 2 min, yellow putty gross squeezes x 20 reps; exercises performed for stretching, ROM, and strengthening.  Performed 2# free weight for L wrist flexion/extension (2 positions), RD/UD, supination/pronation x 15 reps x 2 sets each for wrist strengthening. Received light PROM and LLPS /c 3# weight in wrist flexion x 4 sets of 30 secs.     Wrist ROM: left    EVAL 11/14/17 12/19/17   Flexion 43 35 55   Extension 27 35 60   Radial Deviation 15 16 20   Ulnar Deviation 20 30 30   Supination 70 88 90   Pronation 75 90 90      : Right // Left  70# // 45#      Assessment:  Pt will continue to benefit from skilled OT intervention. She has met most goals at this time. She will be discharged next session.     New/Revised Goals: Continue POC  1)   Patient to be IND with HEP and modalities for pain management--------met  2)   Increase ROM 10 degrees in all wrist planes of mo--------mettion to " increase functional hand use for dressing.--------met  3)   Assess  and pinch. --------met  4)   Decrease edema .2-.3 cm to increase joint mobility /flexibility for functional hand use. --------met  5)   Patient to score at 57% or more on FOTO to demonstrate improved perception of functional LUE use.--------met      Plan:  POC extended: 2 times weekly for 4 weeks to continue working towards goals.

## 2017-12-20 ENCOUNTER — HOSPITAL ENCOUNTER (OUTPATIENT)
Dept: RADIOLOGY | Facility: OTHER | Age: 57
Discharge: HOME | End: 2017-12-20
Attending: ORTHOPAEDIC SURGERY
Payer: COMMERCIAL

## 2017-12-20 ENCOUNTER — OFFICE VISIT (OUTPATIENT)
Dept: ORTHOPEDICS | Facility: CLINIC | Age: 57
End: 2017-12-20
Attending: ORTHOPAEDIC SURGERY
Payer: COMMERCIAL

## 2017-12-20 VITALS — HEIGHT: 64 IN | WEIGHT: 150 LBS | BODY MASS INDEX: 25.61 KG/M2

## 2017-12-20 DIAGNOSIS — M25.532 LEFT WRIST PAIN: Primary | ICD-10-CM

## 2017-12-20 DIAGNOSIS — M25.539 PAIN IN WRIST, UNSPECIFIED LATERALITY: ICD-10-CM

## 2017-12-20 DIAGNOSIS — S52.532D CLOSED COLLES' FRACTURE OF LEFT RADIUS WITH ROUTINE HEALING, SUBSEQUENT ENCOUNTER: ICD-10-CM

## 2017-12-20 PROCEDURE — 99024 POSTOP FOLLOW-UP VISIT: CPT | Mod: S$GLB,,, | Performed by: ORTHOPAEDIC SURGERY

## 2017-12-20 PROCEDURE — 73110 X-RAY EXAM OF WRIST: CPT | Mod: TC,LT

## 2017-12-20 PROCEDURE — 99999 PR PBB SHADOW E&M-EST. PATIENT-LVL II: CPT | Mod: PBBFAC,,, | Performed by: ORTHOPAEDIC SURGERY

## 2017-12-20 PROCEDURE — 73110 X-RAY EXAM OF WRIST: CPT | Mod: 26,LT,, | Performed by: INTERNAL MEDICINE

## 2017-12-20 RX ORDER — DICLOFENAC SODIUM 10 MG/G
2 GEL TOPICAL 3 TIMES DAILY
Qty: 1 TUBE | Refills: 3 | Status: SHIPPED | OUTPATIENT
Start: 2017-12-20 | End: 2019-09-26

## 2017-12-20 NOTE — PROGRESS NOTES
HISTORY OF PRESENT ILLNESS:  Ms. Clark is about three months out, ORIF left   distal radius fracture.  She is doing well, just about finished up in therapy.    Minimal pain reported.  She is getting better motion and better strength.  She   does use the Voltaren gel, which helps out quite a bit.  She would like to have   that refilled.    PHYSICAL EXAMINATION:  LEFT HAND:  The volar incision is well healed.  No significant swelling or   tenderness.  Range of motion in wrist, she has almost full extension and flexion   is still limited to about 45 degrees.   strength improving.  Sensation   intact.    X-RAYS:  AP and lateral, left wrist, demonstrate healed distal radius fracture,   good position, hardware intact.    PLAN:  I would like her to continue with the home exercise program for another   month or two.  Increase activities as tolerated.  We will also call in some   Voltaren gel for topical use.  Follow up in 6-8 weeks or as needed.      KERI  dd: 12/20/2017 15:09:39 (CST)  td: 12/21/2017 09:40:30 (CST)  Doc ID   #[id number]  Job ID #[id number]    CC:

## 2017-12-26 ENCOUNTER — CLINICAL SUPPORT (OUTPATIENT)
Dept: REHABILITATION | Facility: HOSPITAL | Age: 57
End: 2017-12-26
Attending: ORTHOPAEDIC SURGERY
Payer: COMMERCIAL

## 2017-12-26 DIAGNOSIS — Z78.9 IMPAIRED MOBILITY AND ADLS: ICD-10-CM

## 2017-12-26 DIAGNOSIS — Z74.09 IMPAIRED MOBILITY AND ADLS: ICD-10-CM

## 2017-12-26 DIAGNOSIS — M25.639 DECREASED ROM OF WRIST: ICD-10-CM

## 2017-12-26 DIAGNOSIS — M25.532 LEFT WRIST PAIN: ICD-10-CM

## 2017-12-26 PROCEDURE — 97018 PARAFFIN BATH THERAPY: CPT | Mod: PO

## 2017-12-26 PROCEDURE — 97110 THERAPEUTIC EXERCISES: CPT | Mod: PO

## 2017-12-26 NOTE — PROGRESS NOTES
"OT Daily Progress Note    Patient:  Marilee DONOVAN [facility] #:  [id number]   Date of Note: 12/26/2017   Referring Physician:  Gary Regan Jr., *  Diagnosis:  S/P Distal Radius ORIF  No diagnosis found.     Start Time: 11  End Time: 12  Total Time: 60 min  Group Time: 0    Visit 17 of 20 authorized    Number of Cancellations: 0  Number of No Shows: 0    Subjective:   "Everything is feeling good"  Pain: 4 out of 10 L wrist     Objective:   Patient seen by OT this session. Treatment  consist of the following:  Paraffin, Fluidotherapy, Ultrasound, manual therapy/joint mobilization, Therapeutic exercises, Joint protection and Entergy conservation     Patient received paraffin bath to L hand(s) for 10 minutes to increase blood flow, circulation, pain management and for tissue elasticity prior to therex. Pt was instructed on and performed the following therapuetic exercises: juxtaciser x 2 min, hammer stretch for supination/pronation x 2 min, yellow putty gross squeezes x 20 reps; exercises performed for stretching, ROM, and strengthening.  Performed 2# free weight for L wrist flexion/extension (2 positions), RD/UD, supination/pronation x 15 reps x 2 sets each for wrist strengthening. Received light PROM and LLPS /c 3# weight in wrist flexion x 4 sets of 30 secs.     Wrist ROM: left    EVAL 11/14/17 12/19/17   Flexion 43 35 55   Extension 27 35 60   Radial Deviation 15 16 20   Ulnar Deviation 20 30 30   Supination 70 88 90   Pronation 75 90 90      : Right // Left  70# // 45#      Assessment:  Pt has met maximum benefit from skilled OT and demonstrates independence with HEP.     New/Revised Goals: Continue POC  1)   Patient to be IND with HEP and modalities for pain management--------met  2)   Increase ROM 10 degrees in all wrist planes of mo--------mettion to increase functional hand use for dressing.--------met  3)   Assess  and pinch. --------met  4)   Decrease edema .2-.3 cm to increase joint mobility " /flexibility for functional hand use. --------met  5)   Patient to score at 57% or more on FOTO to demonstrate improved perception of functional LUE use.--------met      Plan:  Discharge to home with HEP.

## 2018-02-07 ENCOUNTER — OFFICE VISIT (OUTPATIENT)
Dept: ORTHOPEDICS | Facility: CLINIC | Age: 58
End: 2018-02-07
Attending: ORTHOPAEDIC SURGERY
Payer: COMMERCIAL

## 2018-02-07 VITALS — BODY MASS INDEX: 25.61 KG/M2 | WEIGHT: 150 LBS | HEIGHT: 64 IN

## 2018-02-07 DIAGNOSIS — S52.532D CLOSED COLLES' FRACTURE OF LEFT RADIUS WITH ROUTINE HEALING, SUBSEQUENT ENCOUNTER: Primary | ICD-10-CM

## 2018-02-07 PROCEDURE — 99499 UNLISTED E&M SERVICE: CPT | Mod: S$GLB,,, | Performed by: ORTHOPAEDIC SURGERY

## 2018-02-07 PROCEDURE — 99999 PR PBB SHADOW E&M-EST. PATIENT-LVL III: CPT | Mod: PBBFAC,,, | Performed by: ORTHOPAEDIC SURGERY

## 2018-02-07 NOTE — PROGRESS NOTES
HISTORY OF PRESENT ILLNESS:  Ms. Rice is about four months out from ORIF   left distal radius fracture.  She is doing well.  No problems reported.  She has   finished up therapy, currently on a home exercise program, very pleased with   the results.    PHYSICAL EXAMINATION:  The incision is well healed.  No significant swelling,   mild sensitivity of the scar.  Range of motion of wrist almost full.     strength good.  Sensation intact.    No x-rays today.    PLAN:  Return to full activities.  Continue strengthening and follow up as   needed only.      KERI  dd: 02/07/2018 14:09:20 (CST)  td: 02/08/2018 09:55:22 (CST)  Doc ID   #[id number]  Job ID #[id number]    CC:

## 2018-02-22 ENCOUNTER — OFFICE VISIT (OUTPATIENT)
Dept: OPTOMETRY | Facility: CLINIC | Age: 58
End: 2018-02-22
Payer: COMMERCIAL

## 2018-02-22 DIAGNOSIS — H52.13 MYOPIA OF BOTH EYES WITH ASTIGMATISM AND PRESBYOPIA: ICD-10-CM

## 2018-02-22 DIAGNOSIS — H52.4 MYOPIA OF BOTH EYES WITH ASTIGMATISM AND PRESBYOPIA: ICD-10-CM

## 2018-02-22 DIAGNOSIS — H52.203 MYOPIA OF BOTH EYES WITH ASTIGMATISM AND PRESBYOPIA: ICD-10-CM

## 2018-02-22 DIAGNOSIS — H25.13 NUCLEAR SCLEROSIS OF BOTH EYES: Primary | ICD-10-CM

## 2018-02-22 DIAGNOSIS — H43.813 POSTERIOR VITREOUS DETACHMENT OF BOTH EYES: ICD-10-CM

## 2018-02-22 DIAGNOSIS — Z83.511 FAMILY HISTORY OF GLAUCOMA: ICD-10-CM

## 2018-02-22 PROCEDURE — 99999 PR PBB SHADOW E&M-EST. PATIENT-LVL II: CPT | Mod: PBBFAC,,, | Performed by: OPTOMETRIST

## 2018-02-22 PROCEDURE — 92015 DETERMINE REFRACTIVE STATE: CPT | Mod: S$GLB,,, | Performed by: OPTOMETRIST

## 2018-02-22 PROCEDURE — 92004 COMPRE OPH EXAM NEW PT 1/>: CPT | Mod: S$GLB,,, | Performed by: OPTOMETRIST

## 2018-02-22 NOTE — MEDICAL/APP STUDENT
No changes in vision    Previous [facility] OD    Has synergeyes - starting a new pair. Doesn't need update    1987, metal in OS - ulcer

## 2018-02-22 NOTE — PROGRESS NOTES
HPI     Prev seen at [facility] vision  Wears synergize lenses, happy for now  Blur ou at near x mos, no assoc pain or red, constant, no relief over   time.  Glasses 3 years old  No flash or new floaters, had flare up of floaters around [place]    Last edited by David Ellison, OD on 2/22/2018  9:50 AM. (History)            Assessment /Plan     For exam results, see Encounter Report.    Nuclear sclerosis of both eyes    Myopia of both eyes with astigmatism and presbyopia    Family history of glaucoma    Posterior vitreous detachment of both eyes      1. Educated pt on presence of cataracts and effects on vision. No surgery at this time. Recheck in one year.  2. Spec Rx given. Different lens options discussed with patient. RTC 1 year full exam.  3. Monitor for condition. Patient to report any changes. RTC 1 year recheck.       4. No evidence of holes, tears or detachment of retina. Negative Shafers sign in vitreous. 90 diopter lens exam negative in all quadrants. Patient educated to signs and symptoms of retinal detachment and return to clinic immediately if signs or symptoms arise.

## 2018-03-14 ENCOUNTER — OFFICE VISIT (OUTPATIENT)
Dept: ENDOCRINOLOGY | Facility: CLINIC | Age: 58
End: 2018-03-14
Payer: COMMERCIAL

## 2018-03-14 VITALS
SYSTOLIC BLOOD PRESSURE: 110 MMHG | BODY MASS INDEX: 27.25 KG/M2 | HEIGHT: 64 IN | DIASTOLIC BLOOD PRESSURE: 80 MMHG | WEIGHT: 159.63 LBS | HEART RATE: 74 BPM

## 2018-03-14 DIAGNOSIS — R94.6 ABNORMAL THYROID FUNCTION TEST: ICD-10-CM

## 2018-03-14 DIAGNOSIS — E04.2 MULTIPLE THYROID NODULES: Primary | ICD-10-CM

## 2018-03-14 DIAGNOSIS — Z83.49 FAMILY HISTORY OF THYROID DISEASE: ICD-10-CM

## 2018-03-14 PROCEDURE — 99999 PR PBB SHADOW E&M-EST. PATIENT-LVL III: CPT | Mod: PBBFAC,,, | Performed by: INTERNAL MEDICINE

## 2018-03-14 PROCEDURE — 99204 OFFICE O/P NEW MOD 45 MIN: CPT | Mod: S$GLB,,, | Performed by: INTERNAL MEDICINE

## 2018-03-14 NOTE — PROGRESS NOTES
Subjective:     Patient ID: Marilee Mckee is a 57 y.o. female.    Chief Complaint: Thyromegaly    HPI:   Ms. Mckee is a 57 y.o. female who is here for a consult visit for evaluation multinodular goiter with previous fine needle aspiration demonstrating unsatisfactory specimen.   This was discovered on a visit with primary care physician.     Denies pre-existing thyroid disease. However reports increased fatigue and cold intolerance. Fractured left wrist after a fall. Walking the dog and caught her foot on grass and fell forward on her arm. Reports hoarseness but she attributes this to post nasal drip, and GERD. Denies anterior neck pressure or dysphagia. Denies exposure to radiation or treatment with radiation to the head or neck.     Previous imaging includes US 9/2017.  Denies previous fine needle aspiration, 11/6/2017.  Denies personal history of breast or colon cancer. (had breast benign fibroids removed)  Reports family history of thyroid cancer (maternal aunt) or thyroid disease. (daughter with thyroid disease, younger sister with hyperthyroidism treated with WYATT and older sister with hypothyroidism.)    Supplements:  Hot chocolate with fortified calcium  MVI  Does not take vitamin D3    Diet:  Dark leafy greens, broccoli  Yogurt    Review of Systems   Constitutional: Negative for chills and fever.   HENT: Negative for congestion and sinus pressure.    Eyes: Negative for visual disturbance.   Respiratory: Negative for chest tightness and shortness of breath.    Cardiovascular: Negative for chest pain, palpitations and leg swelling.   Gastrointestinal: Negative for abdominal pain and vomiting.   Genitourinary: Negative for dysuria.   Musculoskeletal: Negative for arthralgias.   Skin: Negative for rash.   Neurological: Negative for weakness.   Hematological: Does not bruise/bleed easily.   Psychiatric/Behavioral: Negative for sleep disturbance.     Objective:     Physical Exam    Vitals:    03/14/18 0750  "  BP: 110/80   BP Location: Left arm   Patient Position: Sitting   BP Method: Medium (Manual)   Pulse: 74   Weight: 72.4 kg (159 lb 9.8 oz)   Height: 5' 4" (1.626 m)     Results for BLOSSOM MORENO (MRN [id number]) as of 3/14/2018 08:04   Ref. Range 5/20/2015 23:11 12/1/2015 11:25 9/14/2017 07:00   TSH Latest Ref Range: 0.400 - 4.000 uIU/mL 5.731 (H) 1.882 4.380 (H)   Free T4 Latest Ref Range: 0.71 - 1.51 ng/dL 0.94 0.76 0.82     11/2017 SPECIMEN  1) FNA Thyroid (Clinician)  FINAL PATHOLOGIC DIAGNOSIS  1. Thyroid nodule, right, fine-needle aspiration:  The [place] System for Reporting Thyroid Cytopathology category: I, Nondiagnostic or unsatisfactory.  Scant colloid and rare follicular epithelial cells, insufficient for evaluation.    Assessment/Plan:     Ms. Chaves is a 57 year old woman who is here for evaluation of nontoxic MNG in the context of subclinical hypothyroidism.   Discussed fine needle aspiration and reviewed in detail ultrasound characteristics of larger nodule. I would recommend FNAB of larger right sised nodule given size.     1. Multiple thyroid nodules    - US Fine Needle Aspiration with Imaging; Future    2. Abnormal thyroid function test  - repeat TSH annually or if symptoms develop  - treatment of subclinical hypothyroidism - discussed indications for treatment including symptoms, elevated TPO antibodies.   - US Fine Needle Aspiration with Imaging; Future    3. Family history of thyroid disease          "

## 2018-03-14 NOTE — PATIENT INSTRUCTIONS
Selenium supplementation may decrease inflammatory activity in patients with autoimmune thyroiditis    Dose is 100 mcg twice daily.      Check thyroid function with Dr. Andujar once yearly or if you feel badly.

## 2018-03-14 NOTE — LETTER
March 19, 2018      Amadeo Andujar MD  [address] [facility]  [place] [address]           [facility] - Endo/Diab/Metab  [address]  [facility] [address]  Phone: [phone]  Fax: [phone]          Patient: Marilee Mckee   MR Number: [id number]   YOB: 1960   Date of Visit: 3/14/2018       Dear Dr. Amadeo Andujar:    Thank you for referring Marilee Mckee to me for evaluation. Attached you will find relevant portions of my assessment and plan of care.    If you have questions, please do not hesitate to call me. I look forward to following Marilee Mckee along with you.    Sincerely,    Dyan Mart MD    Enclosure  CC:  No Recipients    If you would like to receive this communication electronically, please contact [email or web address] or [phone] to request more information on [employer] Link access.    For providers and/or their staff who would like to refer a patient to [facility], please contact us through our one-stop-shop provider referral line, [facility], at [phone].    If you feel you have received this communication in error or would no longer like to receive these types of communications, please e-mail [email or web address]

## 2018-03-20 ENCOUNTER — OFFICE VISIT (OUTPATIENT)
Dept: OBSTETRICS AND GYNECOLOGY | Facility: CLINIC | Age: 58
End: 2018-03-20
Payer: COMMERCIAL

## 2018-03-20 VITALS
SYSTOLIC BLOOD PRESSURE: 112 MMHG | DIASTOLIC BLOOD PRESSURE: 68 MMHG | BODY MASS INDEX: 27.03 KG/M2 | HEIGHT: 64 IN | WEIGHT: 158.31 LBS

## 2018-03-20 DIAGNOSIS — Z12.31 SCREENING MAMMOGRAM, ENCOUNTER FOR: ICD-10-CM

## 2018-03-20 DIAGNOSIS — Z01.419 WELL WOMAN EXAM WITH ROUTINE GYNECOLOGICAL EXAM: Primary | ICD-10-CM

## 2018-03-20 DIAGNOSIS — Z86.73 HISTORY OF STROKE: ICD-10-CM

## 2018-03-20 PROCEDURE — 99999 PR PBB SHADOW E&M-EST. PATIENT-LVL III: CPT | Mod: PBBFAC,,, | Performed by: OBSTETRICS & GYNECOLOGY

## 2018-03-20 PROCEDURE — 88175 CYTOPATH C/V AUTO FLUID REDO: CPT

## 2018-03-20 PROCEDURE — 99396 PREV VISIT EST AGE 40-64: CPT | Mod: S$GLB,,, | Performed by: OBSTETRICS & GYNECOLOGY

## 2018-03-20 NOTE — PROGRESS NOTES
CC: Well woman exam    Marilee Mckee is a 57 y.o. female  presents for a well woman exam.  LMP: Patient's last menstrual period was 2012..  No GYN issues, problems, or complaints.  She is scheduled for a thyroid biopsy.    Past Medical History:   Diagnosis Date    Abnormal Pap smear     Asthma     Cataract     DJD (degenerative joint disease) of knee     GERD (gastroesophageal reflux disease)     Stroke      Past Surgical History:   Procedure Laterality Date    BREAST SURGERY   and     right side and left side/begin fibroid tumor    CERVIX SURGERY      colpo 2002      skin cancers      TONSILLECTOMY      childhood    TONSILLECTOMY, ADENOIDECTOMY      childhood     Social History     Social History    Marital status:      Spouse name: N/A    Number of children: N/A    Years of education: N/A     Occupational History          Social History Main Topics    Smoking status: Former Smoker    Smokeless tobacco: Never Used      Comment: quit >28 years ago    Alcohol use 6.0 oz/week     10 Glasses of wine per week      Comment: has at least one drink daily/10-12 per week    Drug use: No    Sexual activity: Yes     Partners: Male     Birth control/ protection: Post-menopausal     Other Topics Concern    None     Social History Narrative    Retired from     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         Family History   Problem Relation Age of Onset    Stroke Mother     Cataracts Mother     Stroke Father     Diabetes Maternal Aunt     Cancer Maternal Aunt      thyroid cancer    Melanoma Maternal Aunt     Cataracts Maternal Aunt     Heart disease Maternal Uncle     Cataracts Maternal Uncle     Diabetes Maternal Uncle     Heart disease Paternal Uncle     Cancer Paternal Uncle      prostate cancer    Diabetes Paternal Uncle     Heart disease Maternal Grandmother     Diabetes Maternal Grandmother     Heart disease  "Maternal Grandfather     Heart disease Paternal Grandmother     Heart disease Paternal Grandfather     Cancer Paternal Grandfather      bone cancer    Cancer Paternal Aunt      stomach cancer    Diabetes Paternal Aunt     Glaucoma Sister     Strabismus Sister     Amblyopia Sister     Breast cancer Neg Hx     Colon cancer Neg Hx     Ovarian cancer Neg Hx     Macular degeneration Neg Hx     Retinal detachment Neg Hx      OB History      Para Term  AB Living    1 1       1    SAB TAB Ectopic Multiple Live Births            1          /68   Ht 5' 4" (1.626 m)   Wt 71.8 kg (158 lb 4.6 oz)   LMP 2012   BMI 27.17 kg/m²       ROS:    ROS:  GENERAL: Denies weight gain or weight loss. Feeling well overall.   SKIN: Denies rash or lesions.   HEAD: Denies head injury or headache.   NODES: Denies enlarged lymph nodes.   CHEST: Denies chest pain or shortness of breath.   CARDIOVASCULAR: Denies palpitations or left sided chest pain.   ABDOMEN: No abdominal pain, constipation, diarrhea, nausea, vomiting or rectal bleeding.   URINARY: No frequency, dysuria, hematuria, or burning on urination.  REPRODUCTIVE: See HPI.   BREASTS: The patient performs breast self-examination and denies pain, lumps, or nipple discharge.   HEMATOLOGIC: No easy bruisability or excessive bleeding.   MUSCULOSKELETAL: Denies joint pain or swelling.   NEUROLOGIC: Denies syncope or weakness.   PSYCHIATRIC: Denies depression, anxiety or mood swings.    PHYSICAL EXAM:    APPEARANCE: Well nourished, well developed, in no acute distress.  AFFECT: WNL, alert and oriented x 3  SKIN: No acne or hirsutism  NECK: Neck symmetric without masses or thyromegaly  NODES: No inguinal, cervical, axillary, or femoral lymph node enlargement  CHEST: Good respiratory effect  ABDOMEN: Soft.  No tenderness or masses.  No hepatosplenomegaly.  No hernias.  BREASTS: Symmetrical, no skin changes or visible lesions.  No palpable masses, nipple " discharge bilaterally.  PELVIC: Normal external genitalia without lesions.  Normal hair distribution.  Adequate perineal body, normal urethral meatus.  Vagina moist and well rugated without lesions or discharge.  Cervix pink, without lesions, discharge or tenderness.  No significant cystocele or rectocele.  Bimanual exam shows uterus to be normal size, regular, mobile and nontender.  Adnexa without masses or tenderness.    RECTAL: Rectovaginal exam confirms above with normal sphincter tone, no masses.  EXTREMITIES: No edema.      Diagnosis      ICD-10-CM ICD-9-CM    1. Well woman exam with routine gynecological exam Z01.419 V72.31 Liquid-based pap smear, screening   2. Screening mammogram, encounter for Z12.31 V76.12 Mammo Digital Screening Bilat with [facility]   3. History of stroke Z86.73 V12.54      Continue with exercise and healthy diet    Patient was counseled today on A.C.S. Pap guidelines and recommendations for yearly pelvic exams, mammograms and monthly self breast exams; to see her PCP for other health maintenance.     F/U PRN

## 2018-03-21 ENCOUNTER — HOSPITAL ENCOUNTER (OUTPATIENT)
Dept: RADIOLOGY | Facility: HOSPITAL | Age: 58
Discharge: HOME | End: 2018-03-21
Attending: OBSTETRICS & GYNECOLOGY
Payer: COMMERCIAL

## 2018-03-21 DIAGNOSIS — Z12.31 SCREENING MAMMOGRAM, ENCOUNTER FOR: ICD-10-CM

## 2018-03-21 PROCEDURE — 77067 SCR MAMMO BI INCL CAD: CPT | Mod: 26,,, | Performed by: RADIOLOGY

## 2018-03-21 PROCEDURE — 77067 SCR MAMMO BI INCL CAD: CPT | Mod: TC

## 2018-03-21 PROCEDURE — 77063 BREAST TOMOSYNTHESIS BI: CPT | Mod: 26,,, | Performed by: RADIOLOGY

## 2018-04-06 ENCOUNTER — HOSPITAL ENCOUNTER (OUTPATIENT)
Dept: ENDOCRINOLOGY | Facility: CLINIC | Age: 58
Discharge: HOME | End: 2018-04-06
Attending: INTERNAL MEDICINE
Payer: COMMERCIAL

## 2018-04-06 DIAGNOSIS — R94.6 ABNORMAL THYROID FUNCTION TEST: ICD-10-CM

## 2018-04-06 DIAGNOSIS — E04.2 MULTIPLE THYROID NODULES: ICD-10-CM

## 2018-04-06 PROCEDURE — 88173 CYTOPATH EVAL FNA REPORT: CPT | Performed by: PATHOLOGY

## 2018-04-06 PROCEDURE — 76942 ECHO GUIDE FOR BIOPSY: CPT | Mod: S$GLB,,, | Performed by: INTERNAL MEDICINE

## 2018-04-06 PROCEDURE — 10022 US FINE NEEDLE ASPIRATION WITH IMAGING: CPT | Mod: S$GLB,,, | Performed by: INTERNAL MEDICINE

## 2018-04-10 ENCOUNTER — PATIENT MESSAGE (OUTPATIENT)
Dept: INTERNAL MEDICINE | Facility: CLINIC | Age: 58
End: 2018-04-10

## 2018-04-16 ENCOUNTER — OFFICE VISIT (OUTPATIENT)
Dept: INTERNAL MEDICINE | Facility: CLINIC | Age: 58
End: 2018-04-16
Payer: COMMERCIAL

## 2018-04-16 VITALS
TEMPERATURE: 98 F | DIASTOLIC BLOOD PRESSURE: 68 MMHG | BODY MASS INDEX: 26.69 KG/M2 | RESPIRATION RATE: 12 BRPM | HEART RATE: 62 BPM | SYSTOLIC BLOOD PRESSURE: 116 MMHG | WEIGHT: 156.31 LBS | HEIGHT: 64 IN

## 2018-04-16 DIAGNOSIS — J01.90 ACUTE BACTERIAL SINUSITIS: Primary | ICD-10-CM

## 2018-04-16 DIAGNOSIS — J45.20 MILD INTERMITTENT ASTHMA WITHOUT COMPLICATION: Chronic | ICD-10-CM

## 2018-04-16 DIAGNOSIS — B96.89 ACUTE BACTERIAL SINUSITIS: Primary | ICD-10-CM

## 2018-04-16 PROCEDURE — 99999 PR PBB SHADOW E&M-EST. PATIENT-LVL III: CPT | Mod: PBBFAC,,, | Performed by: INTERNAL MEDICINE

## 2018-04-16 PROCEDURE — 99214 OFFICE O/P EST MOD 30 MIN: CPT | Mod: S$GLB,,, | Performed by: INTERNAL MEDICINE

## 2018-04-16 RX ORDER — AMOXICILLIN AND CLAVULANATE POTASSIUM 875; 125 MG/1; MG/1
1 TABLET, FILM COATED ORAL 2 TIMES DAILY
Qty: 10 TABLET | Refills: 0 | Status: SHIPPED | OUTPATIENT
Start: 2018-04-16 | End: 2018-04-21

## 2018-04-16 RX ORDER — ALBUTEROL SULFATE 90 UG/1
2 AEROSOL, METERED RESPIRATORY (INHALATION) EVERY 6 HOURS PRN
Qty: 18 G | Refills: 2 | Status: SHIPPED | OUTPATIENT
Start: 2018-04-16 | End: 2019-08-28 | Stop reason: SDUPTHER

## 2018-04-16 NOTE — PROGRESS NOTES
"Subjective:       Patient ID: Marilee Mckee is a 57 y.o. female.    Chief Complaint: Cough (yellowish mucus, 4 days); Sinus Problem; and Nasal Congestion    HPI    Pt presents for evaluation of cough and rhinorrhea, both productive of thick yellow sputum. She has frontal sinus headache and nasal congestion. No fevers, body aches.   She is on clarinex, singulair, zyrtec daily. She is on mucinex as needed. She has nasal saline rinse as needed. She is unable to tolerate flonase d/t nosebleeds and increased intraocular pressure. She has an albuterol inhaler to use prn, but this  so she needs a new prescription.     Review of Systems   Constitutional: Positive for appetite change. Negative for chills and fever.   HENT: Positive for congestion, ear pain, postnasal drip, rhinorrhea, sinus pain, sinus pressure and sore throat.    Eyes: Negative for discharge and redness.   Respiratory: Positive for cough and shortness of breath.    Cardiovascular: Negative for chest pain.   Gastrointestinal: Negative for diarrhea, nausea and vomiting.   Musculoskeletal: Negative for arthralgias and myalgias.   Skin: Negative for rash.   Allergic/Immunologic: Negative for immunocompromised state.   Neurological: Positive for headaches.   Hematological: Negative for adenopathy.       Objective:        Vitals:    18 1528   BP: 116/68   BP Location: Left arm   Patient Position: Sitting   BP Method: Medium (Manual)   Pulse: 62   Resp: 12   Temp: 97.7 °F (36.5 °C)   TempSrc: Oral   Weight: 70.9 kg (156 lb 4.9 oz)   Height: 5' 4" (1.626 m)       Body mass index is 26.83 kg/m².    Physical Exam   Constitutional: She is oriented to person, place, and time. She appears well-developed and well-nourished.   HENT:   Head: Normocephalic and atraumatic.   Right Ear: External ear and ear canal normal. Tympanic membrane is erythematous. Tympanic membrane is not bulging. A middle ear effusion is present.   Left Ear: External ear and ear " canal normal. Tympanic membrane is not injected, not erythematous and not bulging.   Nose: Mucosal edema and rhinorrhea present. Right sinus exhibits frontal sinus tenderness. Right sinus exhibits no maxillary sinus tenderness. Left sinus exhibits frontal sinus tenderness. Left sinus exhibits no maxillary sinus tenderness.   Mouth/Throat: Posterior oropharyngeal erythema present. No posterior oropharyngeal edema. No tonsillar exudate.   Cobblestoning of posterior oropharynx   Eyes: Conjunctivae are normal. Right eye exhibits no discharge. Left eye exhibits no discharge. Right conjunctiva is not injected. Left conjunctiva is not injected.   Neck: Normal range of motion.   Cardiovascular: Normal rate, regular rhythm, normal heart sounds and intact distal pulses.    Pulmonary/Chest: Effort normal and breath sounds normal. No respiratory distress. She has no wheezes.   Abdominal: Soft. Bowel sounds are normal.   Lymphadenopathy:     She has no cervical adenopathy.   Neurological: She is alert and oriented to person, place, and time.   Skin: Skin is warm and dry. No rash noted.   Psychiatric: She has a normal mood and affect.       Assessment:     1. Acute bacterial sinusitis    2. Mild intermittent asthma without complication           Plan:         1. Acute bacterial sinusitis  - continue antihistamine. Nasal saline rinse BID until symptoms improve.  - amoxicillin-clavulanate 875-125mg (AUGMENTIN) 875-125 mg per tablet; Take 1 tablet by mouth 2 (two) times daily.  Dispense: 10 tablet; Refill: 0    2. Mild intermittent asthma without complication  - albuterol 90 mcg/actuation inhaler; Inhale 2 puffs into the lungs every 6 (six) hours as needed for Wheezing.  Dispense: 18 g; Refill: 2

## 2018-05-29 ENCOUNTER — PATIENT MESSAGE (OUTPATIENT)
Dept: INTERNAL MEDICINE | Facility: CLINIC | Age: 58
End: 2018-05-29

## 2018-05-29 RX ORDER — DESLORATADINE 5 MG/1
5 TABLET ORAL DAILY
Qty: 90 TABLET | Refills: 3 | Status: SHIPPED | OUTPATIENT
Start: 2018-05-29 | End: 2019-05-22 | Stop reason: SDUPTHER

## 2018-08-31 RX ORDER — ATORVASTATIN CALCIUM 20 MG/1
20 TABLET, FILM COATED ORAL DAILY
Qty: 90 TABLET | Refills: 3 | Status: SHIPPED | OUTPATIENT
Start: 2018-08-31 | End: 2019-08-17 | Stop reason: SDUPTHER

## 2018-09-06 ENCOUNTER — PATIENT MESSAGE (OUTPATIENT)
Dept: INTERNAL MEDICINE | Facility: CLINIC | Age: 58
End: 2018-09-06

## 2018-09-10 NOTE — PROGRESS NOTES
Subjective:       Patient ID: Marilee Mckee is a 58 y.o. female.    Chief Complaint: Annual Exam    Patient is a 58 y.o.female who presents today for annual.    Cholesterol: due now  Vaccines: tdap due now; shingrix due now; prevnar today  Mammogram: march 2018; normal  Eye: up to date  Gyn exam: cervical dysplasia in 1988; Dr. Riggs  Colonoscopy: 2012; normal  Exercise: walks 4-6 miles per day  Diet: healthy; lean meats  Derm : up to date    Asthma: well controlled currently; saw dr. Dexter in allergy ; cold is a trigger    Hx of stroke: on asa and statin    Right sided nosebleed; using saline prn  Past Medical History:   Diagnosis Date    Abnormal Pap smear     Asthma     Cataract     DJD (degenerative joint disease) of knee     Fibrocystic breast     GERD (gastroesophageal reflux disease)     Stroke 2015    some memory loss and weakness in right arm     Past Surgical History:   Procedure Laterality Date    BREAST SURGERY  1990 and 2001    right side and left side/begin fibroid tumor    CERVIX SURGERY      colpo 2002      OPEN REDUCTION INTERNAL FIXATION- DISTAL RADIUS Left 9/27/2017    [facility] by [facility]    skin cancers      TONSILLECTOMY      childhood    TONSILLECTOMY, ADENOIDECTOMY      childhood     Social History     Socioeconomic History    Marital status:      Spouse name: Not on file    Number of children: Not on file    Years of education: Not on file    Highest education level: Not on file   Social Needs    Financial resource strain: Not on file    Food insecurity - worry: Not on file    Food insecurity - inability: Not on file    Transportation needs - medical: Not on file    Transportation needs - non-medical: Not on file   Occupational History    Occupation:    Tobacco Use    Smoking status: Former Smoker    Smokeless tobacco: Never Used    Tobacco comment: quit >28 years ago   Substance and Sexual Activity     Alcohol use: Yes     Alcohol/week: 6.0 oz     Types: 10 Glasses of wine per week     Comment: has at least one drink daily/10-12 per week    Drug use: No    Sexual activity: Yes     Partners: Male     Birth control/protection: Post-menopausal   Other Topics Concern    Are you pregnant or think you may be? Not Asked    Breast-feeding Not Asked   Social History Narrative    Retired from     Now working on [employer]     Review of patient's allergies indicates:   Allergen Reactions    Tetracyclines Dermatitis    Ceclor [cefaclor] Other (See Comments)     Canker sores    Keflex [cephalexin] Other (See Comments)     Canker sores    Coconut Hives    Shrimp Hives     Marilee Mckee had no medications administered during this visit.    Review of Systems   Constitutional: Negative for appetite change, chills, diaphoresis, fatigue and fever.   HENT: Negative for congestion, dental problem, ear discharge, ear pain, hearing loss, postnasal drip, sinus pressure and sore throat.    Eyes: Negative for discharge, redness and itching.   Respiratory: Negative for cough, chest tightness, shortness of breath and wheezing.    Cardiovascular: Negative for chest pain, palpitations and leg swelling.   Gastrointestinal: Negative for abdominal pain, constipation, diarrhea, nausea and vomiting.   Endocrine: Negative for cold intolerance and heat intolerance.   Genitourinary: Negative for difficulty urinating, frequency, hematuria and urgency.   Musculoskeletal: Negative for arthralgias, back pain, gait problem, myalgias and neck pain.   Skin: Negative for color change and rash.   Neurological: Negative for dizziness, syncope and headaches.   Hematological: Negative for adenopathy.   Psychiatric/Behavioral: Negative for behavioral problems and sleep disturbance. The patient is not nervous/anxious.        Objective:      Physical Exam   Constitutional: She is oriented to person, place, and time.  She appears well-developed and well-nourished. No distress.   HENT:   Head: Normocephalic and atraumatic.   Right Ear: External ear normal.   Left Ear: External ear normal.   Nose: Nose normal.   Mouth/Throat: Oropharynx is clear and moist. No oropharyngeal exudate.   Eyes: Conjunctivae and EOM are normal. Pupils are equal, round, and reactive to light. Right eye exhibits no discharge. Left eye exhibits no discharge. No scleral icterus.   Neck: Normal range of motion. Neck supple. No JVD present. No thyromegaly present.   Cardiovascular: Normal rate, regular rhythm, normal heart sounds and intact distal pulses. Exam reveals no gallop and no friction rub.   No murmur heard.  Pulmonary/Chest: Effort normal and breath sounds normal. No stridor. No respiratory distress. She has no wheezes. She has no rales. She exhibits no tenderness.   Abdominal: Soft. Bowel sounds are normal. She exhibits no distension. There is no tenderness. There is no rebound.   Musculoskeletal: Normal range of motion. She exhibits no edema or tenderness.   Lymphadenopathy:     She has no cervical adenopathy.   Neurological: She is alert and oriented to person, place, and time. No cranial nerve deficit.   Skin: Skin is warm and dry. No rash noted. She is not diaphoretic. No erythema.   Psychiatric: She has a normal mood and affect. Her behavior is normal.   Nursing note and vitals reviewed.      Assessment and Plan:       1. Annual physical exam    - CBC auto differential; Future  - Comprehensive metabolic panel; Future  - Lipid panel; Future  - Vitamin D; Future  - Urinalysis; Future  - TSH; Future  - (In Office Administered) Pneumococcal Conjugate Vaccine (13 Valent) (IM)    2. Asthma, unspecified asthma severity, unspecified whether complicated, unspecified whether persistent  - stable on meds  - (In Office Administered) Pneumococcal Conjugate Vaccine (13 Valent) (IM)    3. Right-sided nosebleed    - Ambulatory Referral to ENT    4. Stroke:  stable on asa and statin          No Follow-up on file.

## 2018-09-19 ENCOUNTER — OFFICE VISIT (OUTPATIENT)
Dept: OTOLARYNGOLOGY | Facility: CLINIC | Age: 58
End: 2018-09-19
Payer: COMMERCIAL

## 2018-09-19 ENCOUNTER — OFFICE VISIT (OUTPATIENT)
Dept: INTERNAL MEDICINE | Facility: CLINIC | Age: 58
End: 2018-09-19
Payer: COMMERCIAL

## 2018-09-19 VITALS
HEART RATE: 73 BPM | DIASTOLIC BLOOD PRESSURE: 71 MMHG | WEIGHT: 160.06 LBS | RESPIRATION RATE: 16 BRPM | BODY MASS INDEX: 27.33 KG/M2 | HEIGHT: 64 IN | TEMPERATURE: 99 F | SYSTOLIC BLOOD PRESSURE: 110 MMHG

## 2018-09-19 VITALS
WEIGHT: 162.06 LBS | DIASTOLIC BLOOD PRESSURE: 73 MMHG | HEART RATE: 75 BPM | TEMPERATURE: 99 F | SYSTOLIC BLOOD PRESSURE: 132 MMHG | BODY MASS INDEX: 27.81 KG/M2

## 2018-09-19 DIAGNOSIS — Z00.00 ANNUAL PHYSICAL EXAM: Primary | ICD-10-CM

## 2018-09-19 DIAGNOSIS — R04.0 EPISTAXIS: Primary | ICD-10-CM

## 2018-09-19 DIAGNOSIS — I63.9 CEREBROVASCULAR ACCIDENT (CVA), UNSPECIFIED MECHANISM: ICD-10-CM

## 2018-09-19 DIAGNOSIS — R04.0 RIGHT-SIDED NOSEBLEED: ICD-10-CM

## 2018-09-19 DIAGNOSIS — J45.909 ASTHMA, UNSPECIFIED ASTHMA SEVERITY, UNSPECIFIED WHETHER COMPLICATED, UNSPECIFIED WHETHER PERSISTENT: ICD-10-CM

## 2018-09-19 PROCEDURE — 99396 PREV VISIT EST AGE 40-64: CPT | Mod: 25,S$GLB,, | Performed by: INTERNAL MEDICINE

## 2018-09-19 PROCEDURE — 99203 OFFICE O/P NEW LOW 30 MIN: CPT | Mod: 25,S$GLB,, | Performed by: OTOLARYNGOLOGY

## 2018-09-19 PROCEDURE — 99999 PR PBB SHADOW E&M-EST. PATIENT-LVL III: CPT | Mod: PBBFAC,,, | Performed by: OTOLARYNGOLOGY

## 2018-09-19 PROCEDURE — 99999 PR PBB SHADOW E&M-EST. PATIENT-LVL IV: CPT | Mod: PBBFAC,,, | Performed by: INTERNAL MEDICINE

## 2018-09-19 PROCEDURE — 90670 PCV13 VACCINE IM: CPT | Mod: S$GLB,,, | Performed by: INTERNAL MEDICINE

## 2018-09-19 PROCEDURE — 30901 CONTROL OF NOSEBLEED: CPT | Mod: RT,S$GLB,, | Performed by: OTOLARYNGOLOGY

## 2018-09-19 PROCEDURE — 90471 IMMUNIZATION ADMIN: CPT | Mod: S$GLB,,, | Performed by: INTERNAL MEDICINE

## 2018-09-19 NOTE — LETTER
September 21, 2018      Tamy Vera, DO  [address] [facility] [place] [address]           [facility] - Head/Neck Surg Onc  [address]  [facility] [address]  Phone: [phone]  Fax: [phone]          Patient: Marilee Mckee   MR Number: [id number]   YOB: 1960   Date of Visit: 9/19/2018       Dear Dr. Tamy Vera:    Thank you for referring Marilee Mckee to me for evaluation. Attached you will find relevant portions of my assessment and plan of care.    If you have questions, please do not hesitate to call me. I look forward to following Marilee Mckee along with you.    Sincerely,    Isabel Kent MD    Enclosure  CC:  No Recipients    If you would like to receive this communication electronically, please contact [email or web address] or [phone] to request more information on [employer] Link access.    For providers and/or their staff who would like to refer a patient to [facility], please contact us through our one-stop-shop provider referral line, [facility], at [phone].    If you feel you have received this communication in error or would no longer like to receive these types of communications, please e-mail [email or web address]

## 2018-09-20 ENCOUNTER — PATIENT MESSAGE (OUTPATIENT)
Dept: OTOLARYNGOLOGY | Facility: CLINIC | Age: 58
End: 2018-09-20

## 2018-09-21 ENCOUNTER — LAB VISIT (OUTPATIENT)
Dept: LAB | Facility: HOSPITAL | Age: 58
End: 2018-09-21
Attending: INTERNAL MEDICINE
Payer: COMMERCIAL

## 2018-09-21 DIAGNOSIS — Z00.00 ANNUAL PHYSICAL EXAM: ICD-10-CM

## 2018-09-21 LAB
25(OH)D3+25(OH)D2 SERPL-MCNC: 56 NG/ML
ALBUMIN SERPL BCP-MCNC: 4.2 G/DL
ALP SERPL-CCNC: 74 U/L
ALT SERPL W/O P-5'-P-CCNC: 19 U/L
ANION GAP SERPL CALC-SCNC: 8 MMOL/L
AST SERPL-CCNC: 20 U/L
BASOPHILS # BLD AUTO: 0.03 K/UL
BASOPHILS NFR BLD: 0.6 %
BILIRUB SERPL-MCNC: 0.8 MG/DL
BUN SERPL-MCNC: 18 MG/DL
CALCIUM SERPL-MCNC: 9.4 MG/DL
CHLORIDE SERPL-SCNC: 106 MMOL/L
CHOLEST SERPL-MCNC: 183 MG/DL
CHOLEST/HDLC SERPL: 2.4 {RATIO}
CO2 SERPL-SCNC: 26 MMOL/L
CREAT SERPL-MCNC: 0.7 MG/DL
DIFFERENTIAL METHOD: ABNORMAL
EOSINOPHIL # BLD AUTO: 0.2 K/UL
EOSINOPHIL NFR BLD: 4.1 %
ERYTHROCYTE [DISTWIDTH] IN BLOOD BY AUTOMATED COUNT: 13.1 %
EST. GFR  (AFRICAN AMERICAN): >60 ML/MIN/1.73 M^2
EST. GFR  (NON AFRICAN AMERICAN): >60 ML/MIN/1.73 M^2
GLUCOSE SERPL-MCNC: 103 MG/DL
HCT VFR BLD AUTO: 46.5 %
HDLC SERPL-MCNC: 76 MG/DL
HDLC SERPL: 41.5 %
HGB BLD-MCNC: 15 G/DL
IMM GRANULOCYTES # BLD AUTO: 0.01 K/UL
IMM GRANULOCYTES NFR BLD AUTO: 0.2 %
LDLC SERPL CALC-MCNC: 89.8 MG/DL
LYMPHOCYTES # BLD AUTO: 1.6 K/UL
LYMPHOCYTES NFR BLD: 32.8 %
MCH RBC QN AUTO: 32.3 PG
MCHC RBC AUTO-ENTMCNC: 32.3 G/DL
MCV RBC AUTO: 100 FL
MONOCYTES # BLD AUTO: 0.5 K/UL
MONOCYTES NFR BLD: 9.2 %
NEUTROPHILS # BLD AUTO: 2.6 K/UL
NEUTROPHILS NFR BLD: 53.1 %
NONHDLC SERPL-MCNC: 107 MG/DL
NRBC BLD-RTO: 0 /100 WBC
PLATELET # BLD AUTO: 266 K/UL
PMV BLD AUTO: 10.6 FL
POTASSIUM SERPL-SCNC: 4 MMOL/L
PROT SERPL-MCNC: 7.2 G/DL
RBC # BLD AUTO: 4.64 M/UL
SODIUM SERPL-SCNC: 140 MMOL/L
T4 FREE SERPL-MCNC: 0.81 NG/DL
TRIGL SERPL-MCNC: 86 MG/DL
TSH SERPL DL<=0.005 MIU/L-ACNC: 5.71 UIU/ML
WBC # BLD AUTO: 4.88 K/UL

## 2018-09-21 PROCEDURE — 36415 COLL VENOUS BLD VENIPUNCTURE: CPT | Mod: PO

## 2018-09-21 PROCEDURE — 84443 ASSAY THYROID STIM HORMONE: CPT

## 2018-09-21 PROCEDURE — 84439 ASSAY OF FREE THYROXINE: CPT

## 2018-09-21 PROCEDURE — 82306 VITAMIN D 25 HYDROXY: CPT

## 2018-09-21 PROCEDURE — 80061 LIPID PANEL: CPT

## 2018-09-21 PROCEDURE — 80053 COMPREHEN METABOLIC PANEL: CPT

## 2018-09-21 PROCEDURE — 85025 COMPLETE CBC W/AUTO DIFF WBC: CPT

## 2018-09-21 NOTE — PROGRESS NOTES
Chief Complaint   Patient presents with    consult/ nose bleeds         58 y.o. female presents with recurrent right sided nose bleeds. She states that she has had these before which were treated with cautery and did help until recently when she has had several episodes of right-sided epistaxis. She does not note an inciting event. They have occurred while sitting or lying down. They do resolve with pressure. She uses nasal saline to keep her nose from getting dry.       Review of Systems     Constitutional: Negative for fatigue and unexpected weight change.   HENT: per HPI.  Eyes: Negative for visual disturbance.   Respiratory: Negative for shortness of breath, hemoptysis  Cardiovascular: Negative for chest pain and palpitations.   Genitourinary: Negative for dysuria and difficulty urinating.   Musculoskeletal: Negative for decreased ROM, back pain.   Skin: Negative for rash, sunburn, itching.   Neurological: Negative for dizziness and seizures.   Hematological: Negative for adenopathy. Does not bruise/bleed easily.   Psychiatric/Behavioral: Negative for agitation. The patient is not nervous/anxious.   Endocrine: Negative for rapid weight loss/weight gain, heat/cold intolerance.     Past Medical History:   Diagnosis Date    Abnormal Pap smear     Asthma     Cataract     DJD (degenerative joint disease) of knee     Fibrocystic breast     GERD (gastroesophageal reflux disease)     Stroke 2015    some memory loss and weakness in right arm       Past Surgical History:   Procedure Laterality Date    BREAST SURGERY  1990 and 2001    right side and left side/begin fibroid tumor    CERVIX SURGERY      colpo 2002      OPEN REDUCTION INTERNAL FIXATION- DISTAL RADIUS Left 9/27/2017    [facility]    skin cancers      TONSILLECTOMY      childhood    TONSILLECTOMY, ADENOIDECTOMY      childhood       family history includes Amblyopia in her sister; Cancer in her maternal aunt,  paternal aunt, paternal grandfather, and paternal uncle; Cataracts in her maternal aunt, maternal uncle, and mother; Dementia in her father and mother; Diabetes in her maternal aunt, maternal grandmother, maternal uncle, paternal aunt, and paternal uncle; Glaucoma in her sister; Heart disease in her maternal grandfather, maternal grandmother, maternal uncle, paternal grandfather, paternal grandmother, and paternal uncle; Melanoma in her maternal aunt; Strabismus in her sister; Stroke in her father and mother.    Pt  reports that she has quit smoking. she has never used smokeless tobacco. She reports that she drinks about 6.0 oz of alcohol per week. She reports that she does not use drugs.    Review of patient's allergies indicates:   Allergen Reactions    Tetracyclines Dermatitis    Ceclor [cefaclor] Other (See Comments)     Canker sores    Keflex [cephalexin] Other (See Comments)     Canker sores    Coconut Hives    Shrimp Hives        Physical Exam    Vitals:    09/19/18 1516   BP: 132/73   Pulse: 75   Temp: 98.6 °F (37 °C)     Body mass index is 27.81 kg/m².    Physical Exam   Constitutional: She is oriented to person, place, and time. She appears well-developed and well-nourished. No distress.   HENT:   Head: Normocephalic and atraumatic.   Right Ear: Hearing, tympanic membrane, external ear and ear canal normal. Tympanic membrane mobility is normal. No middle ear effusion. No decreased hearing is noted.   Left Ear: Hearing, tympanic membrane, external ear and ear canal normal. Tympanic membrane mobility is normal.  No middle ear effusion. No decreased hearing is noted.   Nose: Epistaxis (Right Kieselbach's plexus with prominent capillary) is observed.   Mouth/Throat: Oropharynx is clear and moist.   Eyes: Conjunctivae, EOM and lids are normal. Pupils are equal, round, and reactive to light. Right eye exhibits no discharge. Left eye exhibits no discharge.   Neck: Trachea normal, normal range of motion and  phonation normal. Neck supple. No tracheal tenderness present. No tracheal deviation, no edema and no erythema present. No thyroid mass and no thyromegaly present.   Cardiovascular: Normal heart sounds.   Pulmonary/Chest: Breath sounds normal. No stridor.   Abdominal: Soft.   Lymphadenopathy:     She has no cervical adenopathy.   Neurological: She is alert and oriented to person, place, and time.   Skin: Skin is warm and dry. No rash noted. She is not diaphoretic. No erythema. No pallor.   Psychiatric: She has a normal mood and affect.   Nursing note and vitals reviewed.    I offered continued observation versus cautery of the prominent vessels on the septum. I explained that the risks of cautery include recurrence, perforation, and discomfort. The benefit would be potential sclerosis of the offending vessels with fewer nosebleeds. Time was allowed for questions, and all questions were answered to her apparent satisfaction. Verbal assent was obtained. The nasal cavity was anesthetized with pontocaine spray. The prominent vessels on the right lower nasal septum were cauterized with silver nitrate without impacting the inferior turbinate at the same point so as to minimize the risk of synechiae formation. The patient tolerated this procedure well without complication. The patient was counseled that there may be some drainage of black or grey material over the next couple of days - this is normal and reflects the composition of the cautery agent which was noted to be silver nitrate.    Assessment     1. Epistaxis          Plan  In summary, Ms. Mckee is a 58 year old female with recurrent right epistaxis. Silver nitrate cautery performed in clinic today. Discussed epistaxis prevention with nasal saline and Vaseline at night. For bleeding, use Afrin prn, pressure, ice packs, and go to ER for severe episodes. Return to clinic if symptoms fail to improve or worsen. All questions were answered.

## 2018-09-22 ENCOUNTER — PATIENT MESSAGE (OUTPATIENT)
Dept: INTERNAL MEDICINE | Facility: CLINIC | Age: 58
End: 2018-09-22

## 2018-09-22 DIAGNOSIS — R79.89 ELEVATED TSH: Primary | ICD-10-CM

## 2018-10-19 ENCOUNTER — LAB VISIT (OUTPATIENT)
Dept: LAB | Facility: HOSPITAL | Age: 58
End: 2018-10-19
Attending: INTERNAL MEDICINE
Payer: COMMERCIAL

## 2018-10-19 DIAGNOSIS — R79.89 ELEVATED TSH: ICD-10-CM

## 2018-10-19 LAB — TSH SERPL DL<=0.005 MIU/L-ACNC: 1.74 UIU/ML

## 2018-10-19 PROCEDURE — 36415 COLL VENOUS BLD VENIPUNCTURE: CPT | Mod: PO

## 2018-10-19 PROCEDURE — 84443 ASSAY THYROID STIM HORMONE: CPT

## 2018-11-07 RX ORDER — MONTELUKAST SODIUM 10 MG/1
10 TABLET ORAL NIGHTLY
Qty: 90 TABLET | Refills: 3 | Status: SHIPPED | OUTPATIENT
Start: 2018-11-07 | End: 2019-11-01 | Stop reason: SDUPTHER

## 2019-02-27 ENCOUNTER — OFFICE VISIT (OUTPATIENT)
Dept: OPTOMETRY | Facility: CLINIC | Age: 59
End: 2019-02-27
Payer: COMMERCIAL

## 2019-02-27 DIAGNOSIS — H25.13 NUCLEAR SCLEROSIS OF BOTH EYES: Primary | ICD-10-CM

## 2019-02-27 DIAGNOSIS — H52.203 MYOPIA OF BOTH EYES WITH ASTIGMATISM AND PRESBYOPIA: ICD-10-CM

## 2019-02-27 DIAGNOSIS — Z46.0 FITTING AND ADJUSTMENT OF SPECTACLES AND CONTACT LENSES: Primary | ICD-10-CM

## 2019-02-27 DIAGNOSIS — H52.13 MYOPIA OF BOTH EYES WITH ASTIGMATISM AND PRESBYOPIA: ICD-10-CM

## 2019-02-27 DIAGNOSIS — H52.4 MYOPIA OF BOTH EYES WITH ASTIGMATISM AND PRESBYOPIA: ICD-10-CM

## 2019-02-27 DIAGNOSIS — H43.393 VITREOUS FLOATERS, BILATERAL: ICD-10-CM

## 2019-02-27 PROCEDURE — 99999 PR PBB SHADOW E&M-EST. PATIENT-LVL II: ICD-10-PCS | Mod: PBBFAC,,, | Performed by: OPTOMETRIST

## 2019-02-27 PROCEDURE — 92310 PR CONTACT LENS FITTING (NO CHANGE): ICD-10-PCS | Mod: ,,, | Performed by: OPTOMETRIST

## 2019-02-27 PROCEDURE — 92015 PR REFRACTION: ICD-10-PCS | Mod: S$GLB,,, | Performed by: OPTOMETRIST

## 2019-02-27 PROCEDURE — 92015 DETERMINE REFRACTIVE STATE: CPT | Mod: S$GLB,,, | Performed by: OPTOMETRIST

## 2019-02-27 PROCEDURE — 92014 COMPRE OPH EXAM EST PT 1/>: CPT | Mod: S$GLB,,, | Performed by: OPTOMETRIST

## 2019-02-27 PROCEDURE — 92310 CONTACT LENS FITTING OU: CPT | Mod: ,,, | Performed by: OPTOMETRIST

## 2019-02-27 PROCEDURE — 92014 PR EYE EXAM, EST PATIENT,COMPREHESV: ICD-10-PCS | Mod: S$GLB,,, | Performed by: OPTOMETRIST

## 2019-02-27 PROCEDURE — 99999 PR PBB SHADOW E&M-EST. PATIENT-LVL II: CPT | Mod: PBBFAC,,, | Performed by: OPTOMETRIST

## 2019-02-27 NOTE — PROGRESS NOTES
LIBRADO LYE 02/2018 Glasses about 1 yr. Old ,still seem fine. Patient hasn't   noticed any vision changes.  Wears contacts, wasn't fit here patient   states they are multifocal but would like distance only correction.    Patient didn't contacts or any information.  Not using any drops. Patient   states she has had floaters for the past 15 yrs, but they have increased   in the past couple months, no flashes.    Last edited by Mirela Benedict on 2/27/2019  9:18 AM. (History)            Assessment /Plan     For exam results, see Encounter Report.    Nuclear sclerosis of both eyes    Vitreous floaters, bilateral    Myopia of both eyes with astigmatism and presbyopia      1. Educated pt on presence of cataracts and effects on vision. No surgery at this time. Recheck in one year.  2. No evidence of holes, tears or detachment of retina. Negative Shafers sign in vitreous. 90 diopter lens exam negative in all quadrants. Patient educated to signs and symptoms of retinal detachment and return to clinic immediately if signs or symptoms arise.  3. Spec Rx given and  Contact lens trials ordered for pt. Daily wear only advised, with education to risks of extended wear.  Discussed lens care, compliance and solutions.  RTC 2 weeks contact lens follow up. . Different lens options discussed with patient. RTC 1 year full exam.

## 2019-03-12 ENCOUNTER — PATIENT MESSAGE (OUTPATIENT)
Dept: OBSTETRICS AND GYNECOLOGY | Facility: CLINIC | Age: 59
End: 2019-03-12

## 2019-03-12 ENCOUNTER — TELEPHONE (OUTPATIENT)
Dept: OBSTETRICS AND GYNECOLOGY | Facility: CLINIC | Age: 59
End: 2019-03-12

## 2019-03-12 ENCOUNTER — PATIENT MESSAGE (OUTPATIENT)
Dept: OPTOMETRY | Facility: CLINIC | Age: 59
End: 2019-03-12

## 2019-03-12 DIAGNOSIS — Z12.31 SCREENING MAMMOGRAM, ENCOUNTER FOR: Primary | ICD-10-CM

## 2019-03-22 ENCOUNTER — HOSPITAL ENCOUNTER (OUTPATIENT)
Dept: RADIOLOGY | Facility: HOSPITAL | Age: 59
Discharge: HOME | End: 2019-03-22
Attending: OBSTETRICS & GYNECOLOGY
Payer: COMMERCIAL

## 2019-03-22 DIAGNOSIS — Z12.31 SCREENING MAMMOGRAM, ENCOUNTER FOR: ICD-10-CM

## 2019-03-22 PROCEDURE — 77067 SCR MAMMO BI INCL CAD: CPT | Mod: 26,,, | Performed by: RADIOLOGY

## 2019-03-22 PROCEDURE — 77063 MAMMO DIGITAL SCREENING BILAT WITH TOMOSYNTHESIS_CAD: ICD-10-PCS | Mod: 26,,, | Performed by: RADIOLOGY

## 2019-03-22 PROCEDURE — 77067 SCR MAMMO BI INCL CAD: CPT | Mod: TC

## 2019-03-22 PROCEDURE — 77063 BREAST TOMOSYNTHESIS BI: CPT | Mod: 26,,, | Performed by: RADIOLOGY

## 2019-03-22 PROCEDURE — 77067 MAMMO DIGITAL SCREENING BILAT WITH TOMOSYNTHESIS_CAD: ICD-10-PCS | Mod: 26,,, | Performed by: RADIOLOGY

## 2019-04-05 ENCOUNTER — OFFICE VISIT (OUTPATIENT)
Dept: OBSTETRICS AND GYNECOLOGY | Facility: CLINIC | Age: 59
End: 2019-04-05
Payer: COMMERCIAL

## 2019-04-05 VITALS
DIASTOLIC BLOOD PRESSURE: 64 MMHG | BODY MASS INDEX: 28.02 KG/M2 | WEIGHT: 164.13 LBS | HEIGHT: 64 IN | SYSTOLIC BLOOD PRESSURE: 116 MMHG

## 2019-04-05 DIAGNOSIS — Z78.0 POSTMENOPAUSAL: ICD-10-CM

## 2019-04-05 DIAGNOSIS — Z01.419 ENCOUNTER FOR GYNECOLOGICAL EXAMINATION WITHOUT ABNORMAL FINDING: Primary | ICD-10-CM

## 2019-04-05 PROCEDURE — 99999 PR PBB SHADOW E&M-EST. PATIENT-LVL III: CPT | Mod: PBBFAC,,, | Performed by: OBSTETRICS & GYNECOLOGY

## 2019-04-05 PROCEDURE — 99396 PR PREVENTIVE VISIT,EST,40-64: ICD-10-PCS | Mod: S$GLB,,, | Performed by: OBSTETRICS & GYNECOLOGY

## 2019-04-05 PROCEDURE — 99999 PR PBB SHADOW E&M-EST. PATIENT-LVL III: ICD-10-PCS | Mod: PBBFAC,,, | Performed by: OBSTETRICS & GYNECOLOGY

## 2019-04-05 PROCEDURE — 99396 PREV VISIT EST AGE 40-64: CPT | Mod: S$GLB,,, | Performed by: OBSTETRICS & GYNECOLOGY

## 2019-04-05 NOTE — PROGRESS NOTES
CC: Well woman exam    Mariah Mckee is a 58 y.o. female  presents for a well woman exam.  LMP: Patient's last menstrual period was 2012..  No GYN  issues, problems, or complaints.  She is working on [employer]. Is writing and enjoying her [facility]    Past Medical History:   Diagnosis Date    Abnormal Pap smear     Asthma     Cataract     DJD (degenerative joint disease) of knee     Fibrocystic breast     GERD (gastroesophageal reflux disease)     Stroke     some memory loss and weakness in right arm     Past Surgical History:   Procedure Laterality Date    BREAST BIOPSY Right     benign    BREAST BIOPSY Left     benign    BREAST SURGERY   and     right side and left side/begin fibroid tumor    CERVIX SURGERY      colpo 2002      OPEN REDUCTION INTERNAL FIXATION- DISTAL RADIUS Left 2017    Performed by [facility] Jr[facility] at [facility]    radial plate       skin cancers      TONSILLECTOMY      childhood    TONSILLECTOMY, ADENOIDECTOMY      childhood     Social History     Socioeconomic History    Marital status:      Spouse name: Not on file    Number of children: Not on file    Years of education: Not on file    Highest education level: Not on file   Occupational History    Occupation:    Social Needs    Financial resource strain: Not on file    Food insecurity:     Worry: Not on file     Inability: Not on file    Transportation needs:     Medical: Not on file     Non-medical: Not on file   Tobacco Use    Smoking status: Former Smoker    Smokeless tobacco: Never Used    Tobacco comment: quit >28 years ago   Substance and Sexual Activity    Alcohol use: Yes     Alcohol/week: 6.0 oz     Types: 10 Glasses of wine per week     Comment: has at least one drink daily/10-12 per week    Drug use: No    Sexual activity: Yes     Partners: Male     Birth control/protection: Post-menopausal   Lifestyle    Physical activity:     Days  "per week: Not on file     Minutes per session: Not on file    Stress: Not on file   Relationships    Social connections:     Talks on phone: Not on file     Gets together: Not on file     Attends Latter day service: Not on file     Active member of club or organization: Not on file     Attends meetings of clubs or organizations: Not on file     Relationship status: Not on file   Other Topics Concern    Are you pregnant or think you may be? Not Asked    Breast-feeding Not Asked   Social History Narrative    Retired from     Now working on [employer]     Family History   Problem Relation Age of Onset    Stroke Mother     Cataracts Mother     Dementia Mother     Stroke Father     Dementia Father     Diabetes Maternal Aunt     Cancer Maternal Aunt         thyroid cancer    Melanoma Maternal Aunt     Cataracts Maternal Aunt     Heart disease Maternal Uncle     Cataracts Maternal Uncle     Diabetes Maternal Uncle     Heart disease Paternal Uncle     Cancer Paternal Uncle         prostate cancer    Diabetes Paternal Uncle     Heart disease Maternal Grandmother     Diabetes Maternal Grandmother     Heart disease Maternal Grandfather     Heart disease Paternal Grandmother     Heart disease Paternal Grandfather     Cancer Paternal Grandfather         bone cancer    Cancer Paternal Aunt         stomach cancer    Diabetes Paternal Aunt     Glaucoma Sister     Strabismus Sister     Amblyopia Sister     Breast cancer Neg Hx     Colon cancer Neg Hx     Ovarian cancer Neg Hx     Macular degeneration Neg Hx     Retinal detachment Neg Hx      OB History        1    Para   1    Term   0            AB        Living   1       SAB        TAB        Ectopic        Multiple        Live Births   1                 /64   Ht 5' 4" (1.626 m)   Wt 74.5 kg (164 lb 2.1 oz)   LMP 2012   BMI 28.17 kg/m²       ROS:    ROS:  GENERAL: Denies weight gain or " weight loss. Feeling well overall.   SKIN: Denies rash or lesions.   HEAD: Denies head injury or headache.   NODES: Denies enlarged lymph nodes.   CHEST: Denies chest pain or shortness of breath.   CARDIOVASCULAR: Denies palpitations or left sided chest pain.   ABDOMEN: No abdominal pain, constipation, diarrhea, nausea, vomiting or rectal bleeding.   URINARY: No frequency, dysuria, hematuria, or burning on urination.  REPRODUCTIVE: See HPI.   BREASTS: The patient performs breast self-examination and denies pain, lumps, or nipple discharge.   HEMATOLOGIC: No easy bruisability or excessive bleeding.   MUSCULOSKELETAL: Denies joint pain or swelling.   NEUROLOGIC: Denies syncope or weakness.   PSYCHIATRIC: Denies depression, anxiety or mood swings.    PHYSICAL EXAM:    APPEARANCE: Well nourished, well developed, in no acute distress.  AFFECT: WNL, alert and oriented x 3  SKIN: No acne or hirsutism  NECK: Neck symmetric without masses or thyromegaly  NODES: No inguinal, cervical, axillary, or femoral lymph node enlargement  CHEST: Good respiratory effect  ABDOMEN: Soft.  No tenderness or masses.  No hepatosplenomegaly.  No hernias.  BREASTS: Symmetrical, no skin changes or visible lesions.  No palpable masses, nipple discharge bilaterally.  PELVIC: Normal external genitalia without lesions.  Normal hair distribution.  Adequate perineal body, normal urethral meatus.  Vagina moist and well rugated without lesions or discharge.  Cervix pink, without lesions, discharge or tenderness.  No significant cystocele or rectocele.  Bimanual exam shows uterus to be normal size, regular, mobile and nontender.  Adnexa without masses or tenderness.    RECTAL: Rectovaginal exam confirms above with normal sphincter tone, no masses.  EXTREMITIES: No edema.      ICD-10-CM ICD-9-CM    1. Encounter for gynecological examination without abnormal finding Z01.419 V72.31    2. Postmenopausal Z78.0 V49.81      Colonoscopy to be scheduled by  PCP    Patient was counseled today on A.C.S. Pap guidelines and recommendations for yearly pelvic exams, mammograms and monthly self breast exams; to see her PCP for other health maintenance.     Follow up in about 1 year (around 4/5/2020).

## 2019-05-22 RX ORDER — DESLORATADINE 5 MG/1
TABLET ORAL
Qty: 90 TABLET | Refills: 3 | Status: SHIPPED | OUTPATIENT
Start: 2019-05-22 | End: 2020-04-30 | Stop reason: SDUPTHER

## 2019-05-26 ENCOUNTER — PATIENT MESSAGE (OUTPATIENT)
Dept: DERMATOLOGY | Facility: CLINIC | Age: 59
End: 2019-05-26

## 2019-06-18 ENCOUNTER — OFFICE VISIT (OUTPATIENT)
Dept: DERMATOLOGY | Facility: CLINIC | Age: 59
End: 2019-06-18
Payer: COMMERCIAL

## 2019-06-18 DIAGNOSIS — L81.4 LENTIGINES: ICD-10-CM

## 2019-06-18 DIAGNOSIS — L82.0 LICHENOID KERATOSIS: ICD-10-CM

## 2019-06-18 DIAGNOSIS — L82.1 SEBORRHEIC KERATOSES: Primary | ICD-10-CM

## 2019-06-18 PROCEDURE — 99999 PR PBB SHADOW E&M-EST. PATIENT-LVL II: ICD-10-PCS | Mod: PBBFAC,,, | Performed by: DERMATOLOGY

## 2019-06-18 PROCEDURE — 99213 PR OFFICE/OUTPT VISIT, EST, LEVL III, 20-29 MIN: ICD-10-PCS | Mod: S$GLB,,, | Performed by: DERMATOLOGY

## 2019-06-18 PROCEDURE — 99999 PR PBB SHADOW E&M-EST. PATIENT-LVL II: CPT | Mod: PBBFAC,,, | Performed by: DERMATOLOGY

## 2019-06-18 PROCEDURE — 99213 OFFICE O/P EST LOW 20 MIN: CPT | Mod: S$GLB,,, | Performed by: DERMATOLOGY

## 2019-06-18 NOTE — PROGRESS NOTES
Subjective:       Patient ID:  Mariah Mckee is a 59 y.o. female who presents for   Chief Complaint   Patient presents with    Lesion     Pt presnets for lesion to back started growing x 2 months ago. Changed texture, then fell off. Denies pain or bleeding. No tx.     Lesion         Review of Systems   Constitutional: Negative for fever, chills, fatigue and malaise.   Skin: Positive for activity-related sunscreen use.   Hematologic/Lymphatic: Bruises/bleeds easily.        Objective:    Physical Exam   Constitutional: She appears well-developed and well-nourished. No distress.   Neurological: She is alert and oriented to person, place, and time. She is not disoriented.   Psychiatric: She has a normal mood and affect.   Skin:   Areas Examined (abnormalities noted in diagram):   Head / Face Inspection Performed  Neck Inspection Performed  Chest / Axilla Inspection Performed  Abdomen Inspection Performed  Back Inspection Performed  RUE Inspected  LUE Inspection Performed              Diagram Legend     Erythematous scaling macule/papule c/w actinic keratosis       Vascular papule c/w angioma      Pigmented verrucoid papule/plaque c/w seborrheic keratosis      Yellow umbilicated papule c/w sebaceous hyperplasia      Irregularly shaped tan macule c/w lentigo     1-2 mm smooth white papules consistent with Milia      Movable subcutaneous cyst with punctum c/w epidermal inclusion cyst      Subcutaneous movable cyst c/w pilar cyst      Firm pink to brown papule c/w dermatofibroma      Pedunculated fleshy papule(s) c/w skin tag(s)      Evenly pigmented macule c/w junctional nevus     Mildly variegated pigmented, slightly irregular-bordered macule c/w mildly atypical nevus      Flesh colored to evenly pigmented papule c/w intradermal nevus       Pink pearly papule/plaque c/w basal cell carcinoma      Erythematous hyperkeratotic cursted plaque c/w SCC      Surgical scar with no sign of skin cancer recurrence      Open and  "closed comedones      Inflammatory papules and pustules      Verrucoid papule consistent consistent with wart     Erythematous eczematous patches and plaques     Dystrophic onycholytic nail with subungual debris c/w onychomycosis     Umbilicated papule    Erythematous-base heme-crusted tan verrucoid plaque consistent with inflamed seborrheic keratosis     Erythematous Silvery Scaling Plaque c/w Psoriasis     See annotation      Assessment / Plan:        Seborrheic keratoses  By history on left upper back  Brochure provided      Lentigines  The "ABCD" rules to observe pigmented lesions were reviewed.  sunscreen    Lichenoid keratosis  reassurance  Call if grows, bleeds             Follow up in about 1 year (around 6/18/2020).  "

## 2019-08-18 RX ORDER — ATORVASTATIN CALCIUM 20 MG/1
TABLET, FILM COATED ORAL
Qty: 90 TABLET | Refills: 3 | Status: SHIPPED | OUTPATIENT
Start: 2019-08-18 | End: 2020-07-24
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
## 2019-08-28 ENCOUNTER — OFFICE VISIT (OUTPATIENT)
Dept: INTERNAL MEDICINE | Facility: CLINIC | Age: 59
End: 2019-08-28
Payer: COMMERCIAL

## 2019-08-28 VITALS
HEIGHT: 64 IN | TEMPERATURE: 98 F | WEIGHT: 166.25 LBS | HEART RATE: 64 BPM | SYSTOLIC BLOOD PRESSURE: 122 MMHG | BODY MASS INDEX: 28.38 KG/M2 | DIASTOLIC BLOOD PRESSURE: 80 MMHG

## 2019-08-28 DIAGNOSIS — J01.40 ACUTE PANSINUSITIS, RECURRENCE NOT SPECIFIED: Primary | ICD-10-CM

## 2019-08-28 DIAGNOSIS — J45.20 MILD INTERMITTENT ASTHMA WITHOUT COMPLICATION: Chronic | ICD-10-CM

## 2019-08-28 PROCEDURE — 99999 PR PBB SHADOW E&M-EST. PATIENT-LVL III: ICD-10-PCS | Mod: PBBFAC,,, | Performed by: FAMILY MEDICINE

## 2019-08-28 PROCEDURE — 99214 PR OFFICE/OUTPT VISIT, EST, LEVL IV, 30-39 MIN: ICD-10-PCS | Mod: S$GLB,,, | Performed by: FAMILY MEDICINE

## 2019-08-28 PROCEDURE — 99999 PR PBB SHADOW E&M-EST. PATIENT-LVL III: CPT | Mod: PBBFAC,,, | Performed by: FAMILY MEDICINE

## 2019-08-28 PROCEDURE — 99214 OFFICE O/P EST MOD 30 MIN: CPT | Mod: S$GLB,,, | Performed by: FAMILY MEDICINE

## 2019-08-28 RX ORDER — AMOXICILLIN AND CLAVULANATE POTASSIUM 875; 125 MG/1; MG/1
1 TABLET, FILM COATED ORAL 2 TIMES DAILY
Qty: 20 TABLET | Refills: 0 | Status: SHIPPED | OUTPATIENT
Start: 2019-08-28 | End: 2019-09-07

## 2019-08-28 RX ORDER — BENZONATATE 200 MG/1
200 CAPSULE ORAL 3 TIMES DAILY PRN
Qty: 30 CAPSULE | Refills: 0 | Status: SHIPPED | OUTPATIENT
Start: 2019-08-28 | End: 2019-09-07

## 2019-08-28 RX ORDER — ALBUTEROL SULFATE 90 UG/1
2 AEROSOL, METERED RESPIRATORY (INHALATION) EVERY 4 HOURS PRN
Qty: 18 G | Refills: 11 | Status: SHIPPED | OUTPATIENT
Start: 2019-08-28 | End: 2020-08-27

## 2019-08-28 NOTE — PROGRESS NOTES
Subjective:       Patient ID: Mariah Mckee is a 59 y.o. female.    Chief Complaint: Sore Throat; Otalgia; and Cough    HPI 59-year-old white female with intermittent asthma and allergic rhinitis presents to clinic today secondary to a complaint of chills, ear pain, postnasal drip, runny nose, sore throat, chest congestion, cough productive yellow sputum, chest soreness from cough, generalized body aches, and headaches for the past week.  She has been using Mucinex, Clarinex, Zyrtec, Zantac, and Singulair without relief.  Review of Systems   Constitutional: Positive for chills. Negative for appetite change, fatigue and fever.   HENT: Positive for ear pain, postnasal drip, rhinorrhea and sore throat. Negative for congestion, hearing loss, sinus pressure and tinnitus.    Eyes: Negative for redness, itching and visual disturbance.   Respiratory: Positive for cough (Yellowish sputum) and chest tightness. Negative for shortness of breath and wheezing.    Cardiovascular: Positive for chest pain. Negative for palpitations.   Gastrointestinal: Negative for abdominal pain, constipation, diarrhea, nausea and vomiting.   Genitourinary: Negative for decreased urine volume, difficulty urinating, dysuria, frequency, hematuria and urgency.   Musculoskeletal: Positive for myalgias. Negative for back pain, neck pain and neck stiffness.   Skin: Negative for rash.   Allergic/Immunologic: Positive for environmental allergies.   Neurological: Positive for headaches. Negative for dizziness and light-headedness.   Psychiatric/Behavioral: Negative.        Objective:      Physical Exam   Constitutional: She is oriented to person, place, and time. She appears well-developed and well-nourished. No distress.   HENT:   Head: Normocephalic and atraumatic.   Right Ear: External ear normal. A middle ear effusion is present.   Left Ear: External ear normal. A middle ear effusion is present.   Nose: Mucosal edema and rhinorrhea present. No nose  lacerations, sinus tenderness, nasal deformity, septal deviation or nasal septal hematoma. No epistaxis.  No foreign bodies. Right sinus exhibits maxillary sinus tenderness and frontal sinus tenderness. Left sinus exhibits maxillary sinus tenderness and frontal sinus tenderness.   Mouth/Throat: Oropharynx is clear and moist. No oropharyngeal exudate.   Eyes: Pupils are equal, round, and reactive to light. Conjunctivae and EOM are normal. Right eye exhibits no discharge. Left eye exhibits no discharge. No scleral icterus.   Neck: Normal range of motion. Neck supple. No JVD present. No tracheal deviation present. No thyromegaly present.   Cardiovascular: Normal rate, regular rhythm, normal heart sounds and intact distal pulses. Exam reveals no gallop and no friction rub.   No murmur heard.  Pulmonary/Chest: Effort normal and breath sounds normal. No stridor. No respiratory distress. She has no wheezes. She has no rales.   Abdominal: Soft. Bowel sounds are normal. She exhibits no distension and no mass. There is no tenderness. There is no rebound and no guarding.   Musculoskeletal: Normal range of motion. She exhibits no edema or tenderness.   Lymphadenopathy:     She has no cervical adenopathy.   Neurological: She is alert and oriented to person, place, and time.   Skin: Skin is warm and dry. No rash noted. She is not diaphoretic. No erythema. No pallor.   Psychiatric: She has a normal mood and affect. Her behavior is normal. Judgment and thought content normal.   Nursing note and vitals reviewed.      Assessment:       1. Acute pansinusitis, recurrence not specified    2. Mild intermittent asthma without complication        Plan:       Acute pansinusitis, recurrence not specified  -     amoxicillin-clavulanate 875-125mg (AUGMENTIN) 875-125 mg per tablet; Take 1 tablet by mouth 2 (two) times daily. for 10 days  Dispense: 20 tablet; Refill: 0  -     benzonatate (TESSALON) 200 MG capsule; Take 1 capsule (200 mg total)  by mouth 3 (three) times daily as needed for Cough.  Dispense: 30 capsule; Refill: 0    Mild intermittent asthma without complication  -     albuterol (PROVENTIL/VENTOLIN HFA) 90 mcg/actuation inhaler; Inhale 2 puffs into the lungs every 4 (four) hours as needed for Wheezing or Shortness of Breath.  Dispense: 18 g; Refill: 11      Continue Zyrtec, Clarinex, Singulair, and Mucinex as prescribed.  Tylenol and ibuprofen as needed for fever or pain.  Saltwater or Listerine gargle as needed for sore throat.  Return to clinic as needed if symptoms persist or worsen.
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
## 2019-09-09 ENCOUNTER — PATIENT MESSAGE (OUTPATIENT)
Dept: INTERNAL MEDICINE | Facility: CLINIC | Age: 59
End: 2019-09-09

## 2019-09-09 RX ORDER — BENZONATATE 200 MG/1
200 CAPSULE ORAL 3 TIMES DAILY PRN
Qty: 30 CAPSULE | Refills: 0 | Status: SHIPPED | OUTPATIENT
Start: 2019-09-09 | End: 2019-09-20 | Stop reason: SDUPTHER

## 2019-09-20 ENCOUNTER — OFFICE VISIT (OUTPATIENT)
Dept: INTERNAL MEDICINE | Facility: CLINIC | Age: 59
End: 2019-09-20
Payer: COMMERCIAL

## 2019-09-20 ENCOUNTER — PATIENT MESSAGE (OUTPATIENT)
Dept: INTERNAL MEDICINE | Facility: CLINIC | Age: 59
End: 2019-09-20

## 2019-09-20 ENCOUNTER — HOSPITAL ENCOUNTER (OUTPATIENT)
Dept: RADIOLOGY | Facility: HOSPITAL | Age: 59
Discharge: HOME | End: 2019-09-20
Attending: INTERNAL MEDICINE
Payer: COMMERCIAL

## 2019-09-20 VITALS
HEIGHT: 64 IN | SYSTOLIC BLOOD PRESSURE: 118 MMHG | DIASTOLIC BLOOD PRESSURE: 68 MMHG | TEMPERATURE: 98 F | HEART RATE: 71 BPM | BODY MASS INDEX: 28.42 KG/M2 | WEIGHT: 166.44 LBS | RESPIRATION RATE: 14 BRPM

## 2019-09-20 DIAGNOSIS — E78.5 DYSLIPIDEMIA: ICD-10-CM

## 2019-09-20 DIAGNOSIS — M87.039 AVASCULAR NECROSIS OF LUNATE: Primary | ICD-10-CM

## 2019-09-20 DIAGNOSIS — R05.9 COUGH: ICD-10-CM

## 2019-09-20 DIAGNOSIS — M79.641 RIGHT HAND PAIN: ICD-10-CM

## 2019-09-20 DIAGNOSIS — I63.9 COMPLETED STROKE: ICD-10-CM

## 2019-09-20 DIAGNOSIS — Z00.00 ANNUAL PHYSICAL EXAM: Primary | ICD-10-CM

## 2019-09-20 DIAGNOSIS — J45.909 ASTHMA, UNSPECIFIED ASTHMA SEVERITY, UNSPECIFIED WHETHER COMPLICATED, UNSPECIFIED WHETHER PERSISTENT: Chronic | ICD-10-CM

## 2019-09-20 DIAGNOSIS — Z12.11 SCREEN FOR COLON CANCER: ICD-10-CM

## 2019-09-20 PROCEDURE — 73130 X-RAY EXAM OF HAND: CPT | Mod: 26,RT,, | Performed by: RADIOLOGY

## 2019-09-20 PROCEDURE — 73130 X-RAY EXAM OF HAND: CPT | Mod: TC,PO,RT

## 2019-09-20 PROCEDURE — 71046 XR CHEST PA AND LATERAL: ICD-10-PCS | Mod: 26,,, | Performed by: RADIOLOGY

## 2019-09-20 PROCEDURE — 99396 PREV VISIT EST AGE 40-64: CPT | Mod: 25,S$GLB,, | Performed by: INTERNAL MEDICINE

## 2019-09-20 PROCEDURE — 73110 X-RAY EXAM OF WRIST: CPT | Mod: 26,RT,, | Performed by: RADIOLOGY

## 2019-09-20 PROCEDURE — 99999 PR PBB SHADOW E&M-EST. PATIENT-LVL IV: CPT | Mod: PBBFAC,,, | Performed by: INTERNAL MEDICINE

## 2019-09-20 PROCEDURE — 71046 X-RAY EXAM CHEST 2 VIEWS: CPT | Mod: TC,PO

## 2019-09-20 PROCEDURE — 73110 X-RAY EXAM OF WRIST: CPT | Mod: TC,PO,RT

## 2019-09-20 PROCEDURE — 99999 PR PBB SHADOW E&M-EST. PATIENT-LVL IV: ICD-10-PCS | Mod: PBBFAC,,, | Performed by: INTERNAL MEDICINE

## 2019-09-20 PROCEDURE — 71046 X-RAY EXAM CHEST 2 VIEWS: CPT | Mod: 26,,, | Performed by: RADIOLOGY

## 2019-09-20 PROCEDURE — 73130 XR HAND COMPLETE 3 VIEW RIGHT: ICD-10-PCS | Mod: 26,RT,, | Performed by: RADIOLOGY

## 2019-09-20 PROCEDURE — 96372 THER/PROPH/DIAG INJ SC/IM: CPT | Mod: S$GLB,,, | Performed by: INTERNAL MEDICINE

## 2019-09-20 PROCEDURE — 96372 PR INJECTION,THERAP/PROPH/DIAG2ST, IM OR SUBCUT: ICD-10-PCS | Mod: S$GLB,,, | Performed by: INTERNAL MEDICINE

## 2019-09-20 PROCEDURE — 99396 PR PREVENTIVE VISIT,EST,40-64: ICD-10-PCS | Mod: 25,S$GLB,, | Performed by: INTERNAL MEDICINE

## 2019-09-20 PROCEDURE — 73110 XR WRIST COMPLETE 3 VIEWS RIGHT: ICD-10-PCS | Mod: 26,RT,, | Performed by: RADIOLOGY

## 2019-09-20 RX ORDER — AZITHROMYCIN 250 MG/1
TABLET, FILM COATED ORAL
Qty: 6 TABLET | Refills: 0 | Status: SHIPPED | OUTPATIENT
Start: 2019-09-20 | End: 2020-03-02

## 2019-09-20 RX ORDER — BENZONATATE 200 MG/1
200 CAPSULE ORAL 3 TIMES DAILY PRN
Qty: 30 CAPSULE | Refills: 2 | Status: SHIPPED | OUTPATIENT
Start: 2019-09-20 | End: 2019-09-30

## 2019-09-20 RX ORDER — METHYLPREDNISOLONE 4 MG/1
TABLET ORAL
Qty: 1 PACKAGE | Refills: 0 | Status: SHIPPED | OUTPATIENT
Start: 2019-09-20 | End: 2021-03-05

## 2019-09-20 RX ORDER — TRIAMCINOLONE ACETONIDE 40 MG/ML
40 INJECTION, SUSPENSION INTRA-ARTICULAR; INTRAMUSCULAR
Status: COMPLETED | OUTPATIENT
Start: 2019-09-20 | End: 2019-09-20

## 2019-09-20 RX ADMIN — TRIAMCINOLONE ACETONIDE 40 MG: 40 INJECTION, SUSPENSION INTRA-ARTICULAR; INTRAMUSCULAR at 02:09

## 2019-09-23 ENCOUNTER — PATIENT MESSAGE (OUTPATIENT)
Dept: INTERNAL MEDICINE | Facility: CLINIC | Age: 59
End: 2019-09-23

## 2019-09-24 ENCOUNTER — PATIENT OUTREACH (OUTPATIENT)
Dept: ADMINISTRATIVE | Facility: OTHER | Age: 59
End: 2019-09-24

## 2019-09-24 ENCOUNTER — LAB VISIT (OUTPATIENT)
Dept: LAB | Facility: HOSPITAL | Age: 59
End: 2019-09-24
Attending: INTERNAL MEDICINE
Payer: COMMERCIAL

## 2019-09-24 DIAGNOSIS — E78.5 DYSLIPIDEMIA: ICD-10-CM

## 2019-09-24 DIAGNOSIS — I63.9 COMPLETED STROKE: ICD-10-CM

## 2019-09-24 DIAGNOSIS — J45.909 ASTHMA, UNSPECIFIED ASTHMA SEVERITY, UNSPECIFIED WHETHER COMPLICATED, UNSPECIFIED WHETHER PERSISTENT: Chronic | ICD-10-CM

## 2019-09-24 DIAGNOSIS — Z00.00 ANNUAL PHYSICAL EXAM: ICD-10-CM

## 2019-09-24 LAB
BILIRUB UR QL STRIP: NEGATIVE
CLARITY UR REFRACT.AUTO: CLEAR
COLOR UR AUTO: YELLOW
GLUCOSE UR QL STRIP: NEGATIVE
HGB UR QL STRIP: NEGATIVE
KETONES UR QL STRIP: NEGATIVE
LEUKOCYTE ESTERASE UR QL STRIP: NEGATIVE
NITRITE UR QL STRIP: NEGATIVE
PH UR STRIP: 6 [PH] (ref 5–8)
PROT UR QL STRIP: NEGATIVE
SP GR UR STRIP: 1.02 (ref 1–1.03)
URN SPEC COLLECT METH UR: NORMAL

## 2019-09-24 PROCEDURE — 81003 URINALYSIS AUTO W/O SCOPE: CPT

## 2019-09-26 ENCOUNTER — OFFICE VISIT (OUTPATIENT)
Dept: ORTHOPEDICS | Facility: CLINIC | Age: 59
End: 2019-09-26
Payer: COMMERCIAL

## 2019-09-26 DIAGNOSIS — M18.11 PRIMARY OSTEOARTHRITIS OF FIRST CARPOMETACARPAL JOINT OF RIGHT HAND: ICD-10-CM

## 2019-09-26 DIAGNOSIS — M79.641 RIGHT HAND PAIN: ICD-10-CM

## 2019-09-26 PROCEDURE — 99213 OFFICE O/P EST LOW 20 MIN: CPT | Mod: S$GLB,,, | Performed by: ORTHOPAEDIC SURGERY

## 2019-09-26 PROCEDURE — 99213 PR OFFICE/OUTPT VISIT, EST, LEVL III, 20-29 MIN: ICD-10-PCS | Mod: S$GLB,,, | Performed by: ORTHOPAEDIC SURGERY

## 2019-09-26 PROCEDURE — 99999 PR PBB SHADOW E&M-EST. PATIENT-LVL III: CPT | Mod: PBBFAC,,, | Performed by: ORTHOPAEDIC SURGERY

## 2019-09-26 PROCEDURE — 99999 PR PBB SHADOW E&M-EST. PATIENT-LVL III: ICD-10-PCS | Mod: PBBFAC,,, | Performed by: ORTHOPAEDIC SURGERY

## 2019-09-26 NOTE — LETTER
September 26, 2019      Tamy Vera,   [address] [facility] [place] [address]           [facility]  [address], [address]  [facility] [address]  Phone: [phone]          Patient: Mariah Mckee   MR Number: [id number]   YOB: 1960   Date of Visit: 9/26/2019       Dear Dr. Tamy Vera:    Thank you for referring Mariah Mckee to me for evaluation. Attached you will find relevant portions of my assessment and plan of care.    If you have questions, please do not hesitate to call me. I look forward to following Mariah Mckee along with you.    Sincerely,    Gary Regan Jr., MD    Enclosure  CC:  No Recipients    If you would like to receive this communication electronically, please contact [email or web address] or [phone] to request more information on [facility] access.    For providers and/or their staff who would like to refer a patient to [facility], please contact us through our one-stop-shop provider referral line, [facility], at [phone].    If you feel you have received this communication in error or would no longer like to receive these types of communications, please e-mail [email or web address]

## 2019-09-26 NOTE — PROGRESS NOTES
Subjective:      Patient ID: Mariah Mckee is a 59 y.o. female.    Chief Complaint: Follow-up (Right hand pain)      HPI  Mariah Mckee is a  59 y.o. female presenting today for right hand and thumb pain.  There was not a history of trauma.  Onset of symptoms began several months ago  However recently she was started on a Medrol Dosepak for bronchitis and this has helped her thumb and hand quite significantly  No numbness or tingling is reported  A recent x-ray was taken which was suspicious for avascular necrosis of the lunate and she is scheduled for an MRI of the right wrist to check this.      Review of patient's allergies indicates:   Allergen Reactions    Tetracyclines Dermatitis    Ceclor [cefaclor] Other (See Comments)     Canker sores    Keflex [cephalexin] Other (See Comments)     Canker sores    Cat dander     Coconut Hives    Shrimp Hives    Tree and shrub pollen          Current Outpatient Medications   Medication Sig Dispense Refill    albuterol (PROVENTIL/VENTOLIN HFA) 90 mcg/actuation inhaler Inhale 2 puffs into the lungs every 4 (four) hours as needed for Wheezing or Shortness of Breath. 18 g 11    atorvastatin (LIPITOR) 20 MG tablet TAKE 1 TABLET BY MOUTH EVERY DAY 90 tablet 3    benzonatate (TESSALON) 200 MG capsule Take 1 capsule (200 mg total) by mouth 3 (three) times daily as needed for Cough. 30 capsule 2    desloratadine (CLARINEX) 5 mg tablet TAKE 1 TABLET BY MOUTH EVERY DAY 90 tablet 3    diclofenac sodium (VOLTAREN) 1 % Gel Apply 2 g topically 3 (three) times daily. 1 Tube 3    guaifenesin (MUCINEX) 600 mg 12 hr tablet Take 1,200 mg by mouth 2 (two) times daily.      montelukast (SINGULAIR) 10 mg tablet TAKE 1 TABLET (10 MG TOTAL) BY MOUTH EVERY EVENING. 90 tablet 3    ranitidine (ZANTAC) 150 MG capsule Take 150 mg by mouth 2 (two) times daily.        azithromycin (ZITHROMAX Z-CRYSTAL) 250 MG tablet Take 2 tabs the first day and 1 tab qd thereafter. 6 tablet 0     methylPREDNISolone (MEDROL DOSEPACK) 4 mg tablet Take as directed 1 Package 0     No current facility-administered medications for this visit.        Past Medical History:   Diagnosis Date    Abnormal Pap smear     Asthma     Cataract     DJD (degenerative joint disease) of knee     Fibrocystic breast     GERD (gastroesophageal reflux disease)     Stroke 2015    some memory loss and weakness in right arm       Past Surgical History:   Procedure Laterality Date    BREAST BIOPSY Right 1990    benign    BREAST BIOPSY Left 2001    benign    BREAST SURGERY  1990 and 2001    right side and left side/begin fibroid tumor    CERVIX SURGERY      colpo 2002      radial plate       skin cancers      TONSILLECTOMY      childhood    TONSILLECTOMY, ADENOIDECTOMY      childhood       Review of Systems:  ROS    OBJECTIVE:     PHYSICAL EXAM:       Vitals:    09/26/19 1359   PainSc:   5   PainLoc: Hand     Well developed, well nourished female in no acute distress  Alert and oriented x 3  HEENT- Normal exam  Lungs- Clear to auscultation  Heart- Regular rate and rhythm  Abdomen- Soft nontender  Extremity exam- examination right hand wrist there is no swelling there is tenderness at the base of the right thumb at the CMC joint  Positive grind test  Mild crepitation and some loss of motion of the CMC   strength decreased  Range of motion wrist full  No tenderness over the lunate    RADIOGRAPHS:  AP lateral x-rays right wrist reviewed demonstrate little bit of sclerosis of the lunate which appears to be degenerative to me  Also severe degenerative changes are noted of the CMC joint right thumb  Comments: I have personally reviewed the imaging and I agree with the above radiologist's report.    ASSESSMENT/PLAN:     IMPRESSION:  1.  Right thumb CMC arthritis.  2.  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    Right hand pain     Primary osteoarthritis of first carpometacarpal joint of right hand            Disclaimer: This note has been generated using voice-recognition software. There may be typographical errors that have been missed during proof-reading.

## 2019-09-27 ENCOUNTER — HOSPITAL ENCOUNTER (OUTPATIENT)
Dept: RADIOLOGY | Facility: HOSPITAL | Age: 59
Discharge: HOME | End: 2019-09-27
Attending: INTERNAL MEDICINE
Payer: COMMERCIAL

## 2019-09-27 DIAGNOSIS — M87.039 AVASCULAR NECROSIS OF LUNATE: ICD-10-CM

## 2019-09-27 PROCEDURE — 73221 MRI JOINT UPR EXTREM W/O DYE: CPT | Mod: TC,RT

## 2019-09-27 PROCEDURE — 73221 MRI WRIST WITHOUT CONTRAST RIGHT: ICD-10-PCS | Mod: 26,RT,, | Performed by: RADIOLOGY

## 2019-09-27 PROCEDURE — 73221 MRI JOINT UPR EXTREM W/O DYE: CPT | Mod: 26,RT,, | Performed by: RADIOLOGY

## 2019-09-27 PROCEDURE — 73218 MRI UPPER EXTREMITY W/O DYE: CPT | Mod: 26,RT,, | Performed by: RADIOLOGY

## 2019-09-27 PROCEDURE — 73218 MRI UPPER EXTREMITY W/O DYE: CPT | Mod: TC,RT

## 2019-09-27 PROCEDURE — 73218 MRI HAND FINGERS WITHOUT CONTRAST RIGHT: ICD-10-PCS | Mod: 26,RT,, | Performed by: RADIOLOGY

## 2019-10-15 ENCOUNTER — PATIENT OUTREACH (OUTPATIENT)
Dept: ADMINISTRATIVE | Facility: OTHER | Age: 59
End: 2019-10-15

## 2019-10-17 ENCOUNTER — OFFICE VISIT (OUTPATIENT)
Dept: ORTHOPEDICS | Facility: CLINIC | Age: 59
End: 2019-10-17
Payer: COMMERCIAL

## 2019-10-17 DIAGNOSIS — M18.11 PRIMARY OSTEOARTHRITIS OF FIRST CARPOMETACARPAL JOINT OF RIGHT HAND: Primary | ICD-10-CM

## 2019-10-17 PROCEDURE — 99213 OFFICE O/P EST LOW 20 MIN: CPT | Mod: 25,S$GLB,, | Performed by: ORTHOPAEDIC SURGERY

## 2019-10-17 PROCEDURE — 99213 PR OFFICE/OUTPT VISIT, EST, LEVL III, 20-29 MIN: ICD-10-PCS | Mod: 25,S$GLB,, | Performed by: ORTHOPAEDIC SURGERY

## 2019-10-17 PROCEDURE — 99999 PR PBB SHADOW E&M-EST. PATIENT-LVL III: ICD-10-PCS | Mod: PBBFAC,,, | Performed by: ORTHOPAEDIC SURGERY

## 2019-10-17 PROCEDURE — 20600 PR DRAIN/INJECT SMALL JOINT/BURSA: ICD-10-PCS | Mod: F5,S$GLB,, | Performed by: ORTHOPAEDIC SURGERY

## 2019-10-17 PROCEDURE — 20600 DRAIN/INJ JOINT/BURSA W/O US: CPT | Mod: F5,S$GLB,, | Performed by: ORTHOPAEDIC SURGERY

## 2019-10-17 PROCEDURE — 99999 PR PBB SHADOW E&M-EST. PATIENT-LVL III: CPT | Mod: PBBFAC,,, | Performed by: ORTHOPAEDIC SURGERY

## 2019-10-17 RX ORDER — TRIAMCINOLONE ACETONIDE 40 MG/ML
20 INJECTION, SUSPENSION INTRA-ARTICULAR; INTRAMUSCULAR
Status: COMPLETED | OUTPATIENT
Start: 2019-10-17 | End: 2019-10-17

## 2019-10-17 RX ORDER — NAPROXEN SODIUM 220 MG/1
81 TABLET, FILM COATED ORAL DAILY
COMMUNITY

## 2019-10-17 RX ADMIN — TRIAMCINOLONE ACETONIDE 20 MG: 40 INJECTION, SUSPENSION INTRA-ARTICULAR; INTRAMUSCULAR at 09:10

## 2019-10-17 NOTE — PROGRESS NOTES
Subjective:      Patient ID: Mariah Colmenares is a 59 y.o. female.  Chief Complaint: Pain of the Right Hand      HPI  Mariah Colmenares is a  59 y.o. female presenting today for follow up of right wrist and hand pain.  She reports that she is having ongoing symptoms on the radial side of the wrist and at the base of the right thumb  A recent MRI of the right wrist was reviewed with the patient today.  The results of the MRI show that she does have signs of ulnar impaction syndrome affecting the the lunate with some cystic changes but no evidence of collapse and no evidence of Klein box disease  I went over that with her today  Interestingly enough most of her pain is on the radial side of the wrist not the ulnar side of the wrist no numbness or tingling is reported.    Review of patient's allergies indicates:   Allergen Reactions    Tetracyclines Dermatitis    Ceclor [cefaclor] Other (See Comments)     Canker sores    Keflex [cephalexin] Other (See Comments)     Canker sores    Cat dander     Coconut Hives    Shrimp Hives    Tree and shrub pollen          Current Outpatient Medications   Medication Sig Dispense Refill    albuterol (PROVENTIL/VENTOLIN HFA) 90 mcg/actuation inhaler Inhale 2 puffs into the lungs every 4 (four) hours as needed for Wheezing or Shortness of Breath. 18 g 11    aspirin 81 MG Chew Take 81 mg by mouth once daily.      atorvastatin (LIPITOR) 20 MG tablet TAKE 1 TABLET BY MOUTH EVERY DAY 90 tablet 3    desloratadine (CLARINEX) 5 mg tablet TAKE 1 TABLET BY MOUTH EVERY DAY 90 tablet 3    guaifenesin (MUCINEX) 600 mg 12 hr tablet Take 1,200 mg by mouth 2 (two) times daily.      montelukast (SINGULAIR) 10 mg tablet TAKE 1 TABLET (10 MG TOTAL) BY MOUTH EVERY EVENING. 90 tablet 3    ranitidine (ZANTAC) 150 MG capsule Take 150 mg by mouth 2 (two) times daily.        azithromycin (ZITHROMAX Z-CRYSTAL) 250 MG tablet Take 2 tabs the first day and 1 tab qd thereafter. 6 tablet 0    diclofenac  sodium (VOLTAREN) 1 % Gel Apply 2 g topically 3 (three) times daily. 1 Tube 3    methylPREDNISolone (MEDROL DOSEPACK) 4 mg tablet Take as directed 1 Package 0     No current facility-administered medications for this visit.        Past Medical History:   Diagnosis Date    Abnormal Pap smear     Asthma     Cataract     DJD (degenerative joint disease) of knee     Fibrocystic breast     GERD (gastroesophageal reflux disease)     Stroke 2015    some memory loss and weakness in right arm       Past Surgical History:   Procedure Laterality Date    BREAST BIOPSY Right 1990    benign    BREAST BIOPSY Left 2001    benign    BREAST SURGERY  1990 and 2001    right side and left side/begin fibroid tumor    CERVIX SURGERY      colpo 2002      radial plate       skin cancers      TONSILLECTOMY      childhood    TONSILLECTOMY, ADENOIDECTOMY      childhood       OBJECTIVE:   PHYSICAL EXAM:       Vitals:    10/17/19 0846   PainSc:   5     Ortho/SPM Exam  Examination right hand wrist demonstrates tenderness at the base of the right thumb where she has bony enlargement of the CMC joint  Positive grind test  Mild crepitation   strength is decreased  Mild pain with ulnar deviation  Tinel sign negative at the wrist    RADIOGRAPHS:  None  Comments: I have personally reviewed the imaging and I agree with the above radiologist's report.    ASSESSMENT/PLAN:     IMPRESSION:  1.  CMC arthritis right thumb.  2.  Ulnar impaction syndrome right wrist    PLAN:  I explained the nature of the problem to the patient. Recommended injection today.  After pause for time-out identified the right thumb CMC joint injected with Kenalog 20 mg 0.5 cc xylocaine sterile technique  Tolerated the procedure well without complication  I have also recommended Advil or Motrin by mouth and I have given her a wrist splint for part-time use mainly for lifting gripping pushing and pulling      FOLLOW UP:  6-8 weeks    Disclaimer: This note has been  generated using voice-recognition software. There may be typographical errors that have been missed during proof-reading.

## 2019-11-01 RX ORDER — MONTELUKAST SODIUM 10 MG/1
TABLET ORAL
Qty: 90 TABLET | Refills: 3 | Status: SHIPPED | OUTPATIENT
Start: 2019-11-01 | End: 2020-10-19

## 2019-12-02 ENCOUNTER — PATIENT OUTREACH (OUTPATIENT)
Dept: ADMINISTRATIVE | Facility: OTHER | Age: 59
End: 2019-12-02

## 2019-12-03 ENCOUNTER — OFFICE VISIT (OUTPATIENT)
Dept: ORTHOPEDICS | Facility: CLINIC | Age: 59
End: 2019-12-03
Payer: COMMERCIAL

## 2019-12-03 VITALS — HEIGHT: 64 IN | WEIGHT: 166 LBS | BODY MASS INDEX: 28.34 KG/M2

## 2019-12-03 DIAGNOSIS — M18.11 PRIMARY OSTEOARTHRITIS OF FIRST CARPOMETACARPAL JOINT OF RIGHT HAND: Primary | ICD-10-CM

## 2019-12-03 PROCEDURE — 99999 PR PBB SHADOW E&M-EST. PATIENT-LVL III: CPT | Mod: PBBFAC,,, | Performed by: ORTHOPAEDIC SURGERY

## 2019-12-03 PROCEDURE — 99213 OFFICE O/P EST LOW 20 MIN: CPT | Mod: S$GLB,,, | Performed by: ORTHOPAEDIC SURGERY

## 2019-12-03 PROCEDURE — 99213 PR OFFICE/OUTPT VISIT, EST, LEVL III, 20-29 MIN: ICD-10-PCS | Mod: S$GLB,,, | Performed by: ORTHOPAEDIC SURGERY

## 2019-12-03 PROCEDURE — 99999 PR PBB SHADOW E&M-EST. PATIENT-LVL III: ICD-10-PCS | Mod: PBBFAC,,, | Performed by: ORTHOPAEDIC SURGERY

## 2019-12-03 NOTE — PROGRESS NOTES
Subjective:      Patient ID: Mariah Colmenares is a 59 y.o. female.  Chief Complaint: Pain of the Right Hand      HPI  Mariah Colmenares is a  59 y.o. female presenting today for follow up of right thumb and wrist arthritis.  She reports that she is doing excellent after the previous injection  No further pain  Very pleased with the results  She does use the brace from time to time.    Review of patient's allergies indicates:   Allergen Reactions    Tetracyclines Dermatitis    Ceclor [cefaclor] Other (See Comments)     Canker sores    Keflex [cephalexin] Other (See Comments)     Canker sores    Cat dander     Coconut Hives    Shrimp Hives    Tree and shrub pollen          Current Outpatient Medications   Medication Sig Dispense Refill    albuterol (PROVENTIL/VENTOLIN HFA) 90 mcg/actuation inhaler Inhale 2 puffs into the lungs every 4 (four) hours as needed for Wheezing or Shortness of Breath. 18 g 11    aspirin 81 MG Chew Take 81 mg by mouth once daily.      atorvastatin (LIPITOR) 20 MG tablet TAKE 1 TABLET BY MOUTH EVERY DAY 90 tablet 3    azithromycin (ZITHROMAX Z-CRYSTAL) 250 MG tablet Take 2 tabs the first day and 1 tab qd thereafter. 6 tablet 0    desloratadine (CLARINEX) 5 mg tablet TAKE 1 TABLET BY MOUTH EVERY DAY 90 tablet 3    guaifenesin (MUCINEX) 600 mg 12 hr tablet Take 1,200 mg by mouth 2 (two) times daily.      methylPREDNISolone (MEDROL DOSEPACK) 4 mg tablet Take as directed 1 Package 0    montelukast (SINGULAIR) 10 mg tablet TAKE 1 TABLET BY MOUTH EVERY DAY IN THE EVENING 90 tablet 3    ranitidine (ZANTAC) 150 MG capsule Take 150 mg by mouth 2 (two) times daily.        diclofenac sodium (VOLTAREN) 1 % Gel Apply 2 g topically 3 (three) times daily. 1 Tube 3     No current facility-administered medications for this visit.        Past Medical History:   Diagnosis Date    Abnormal Pap smear     Asthma     Cataract     DJD (degenerative joint disease) of knee     Fibrocystic breast     GERD  "(gastroesophageal reflux disease)     Stroke 2015    some memory loss and weakness in right arm       Past Surgical History:   Procedure Laterality Date    BREAST BIOPSY Right 1990    benign    BREAST BIOPSY Left 2001    benign    BREAST SURGERY  1990 and 2001    right side and left side/begin fibroid tumor    CERVIX SURGERY      colpo 2002      radial plate       skin cancers      TONSILLECTOMY      childhood    TONSILLECTOMY, ADENOIDECTOMY      childhood       OBJECTIVE:   PHYSICAL EXAM:  Height: 5' 4" (162.6 cm) Weight: 75.3 kg (166 lb)  Vitals:    12/03/19 0820   Weight: 75.3 kg (166 lb)   Height: 5' 4" (1.626 m)   PainSc: 0-No pain     Ortho/SPM Exam  Examination right hand wrist no tenderness no swelling  Range of motion excellent   strength improved      RADIOGRAPHS:  None  Comments: I have personally reviewed the imaging and I agree with the above radiologist's report.    ASSESSMENT/PLAN:     IMPRESSION:  CMC arthritis improved    PLAN:  Continue brace as needed Advil or Motrin by mouth      FOLLOW UP:  As needed    Disclaimer: This note has been generated using voice-recognition software. There may be typographical errors that have been missed during proof-reading.  "

## 2020-02-27 ENCOUNTER — PATIENT OUTREACH (OUTPATIENT)
Dept: ADMINISTRATIVE | Facility: OTHER | Age: 60
End: 2020-02-27

## 2020-03-02 ENCOUNTER — OFFICE VISIT (OUTPATIENT)
Dept: OPTOMETRY | Facility: CLINIC | Age: 60
End: 2020-03-02
Payer: COMMERCIAL

## 2020-03-02 DIAGNOSIS — H43.393 VITREOUS FLOATERS, BILATERAL: ICD-10-CM

## 2020-03-02 DIAGNOSIS — H52.13 MYOPIA OF BOTH EYES WITH ASTIGMATISM AND PRESBYOPIA: ICD-10-CM

## 2020-03-02 DIAGNOSIS — H25.13 NUCLEAR SCLEROSIS OF BOTH EYES: Primary | ICD-10-CM

## 2020-03-02 DIAGNOSIS — Z46.0 FITTING AND ADJUSTMENT OF SPECTACLES AND CONTACT LENSES: Primary | ICD-10-CM

## 2020-03-02 DIAGNOSIS — H52.203 MYOPIA OF BOTH EYES WITH ASTIGMATISM AND PRESBYOPIA: ICD-10-CM

## 2020-03-02 DIAGNOSIS — H52.4 MYOPIA OF BOTH EYES WITH ASTIGMATISM AND PRESBYOPIA: ICD-10-CM

## 2020-03-02 PROCEDURE — 92015 DETERMINE REFRACTIVE STATE: CPT | Mod: S$GLB,,, | Performed by: OPTOMETRIST

## 2020-03-02 PROCEDURE — 92014 PR EYE EXAM, EST PATIENT,COMPREHESV: ICD-10-PCS | Mod: S$GLB,,, | Performed by: OPTOMETRIST

## 2020-03-02 PROCEDURE — 99999 PR PBB SHADOW E&M-EST. PATIENT-LVL II: ICD-10-PCS | Mod: PBBFAC,,, | Performed by: OPTOMETRIST

## 2020-03-02 PROCEDURE — 99999 PR PBB SHADOW E&M-EST. PATIENT-LVL III: ICD-10-PCS | Mod: PBBFAC,,, | Performed by: OPTOMETRIST

## 2020-03-02 PROCEDURE — 92015 PR REFRACTION: ICD-10-PCS | Mod: S$GLB,,, | Performed by: OPTOMETRIST

## 2020-03-02 PROCEDURE — 99999 PR PBB SHADOW E&M-EST. PATIENT-LVL III: CPT | Mod: PBBFAC,,, | Performed by: OPTOMETRIST

## 2020-03-02 PROCEDURE — 92014 COMPRE OPH EXAM EST PT 1/>: CPT | Mod: S$GLB,,, | Performed by: OPTOMETRIST

## 2020-03-02 PROCEDURE — 99499 UNLISTED E&M SERVICE: CPT | Mod: S$GLB,,, | Performed by: OPTOMETRIST

## 2020-03-02 PROCEDURE — 99499 NO LOS: ICD-10-PCS | Mod: S$GLB,,, | Performed by: OPTOMETRIST

## 2020-03-02 PROCEDURE — 99999 PR PBB SHADOW E&M-EST. PATIENT-LVL II: CPT | Mod: PBBFAC,,, | Performed by: OPTOMETRIST

## 2020-03-02 RX ORDER — AMOXICILLIN 500 MG/1
CAPSULE ORAL
COMMUNITY
Start: 2020-02-28 | End: 2021-03-05

## 2020-03-02 RX ORDER — FAMOTIDINE 20 MG/1
TABLET, FILM COATED ORAL
COMMUNITY
Start: 2019-12-18

## 2020-03-02 RX ORDER — IBUPROFEN 800 MG/1
TABLET ORAL
COMMUNITY
Start: 2020-02-28 | End: 2021-03-05

## 2020-03-02 RX ORDER — TRAMADOL HYDROCHLORIDE 50 MG/1
TABLET ORAL
COMMUNITY
Start: 2020-02-28 | End: 2021-03-05

## 2020-03-02 NOTE — PROGRESS NOTES
LIBRADO LEY 07/2019  Patient has noticed distance and near are not as clear with   glasses as it used to be.  Contacts are distance only and patient mainly   wears to exercise and still seem fine.  Wears OTC +2.50 readers with   contacts.  Patient hasn't worn contacts since November due to allergies.    Not using any drops.    Last edited by Mirela Benedict on 3/2/2020  9:24 AM. (History)            Assessment /Plan     For exam results, see Encounter Report.    Nuclear sclerosis of both eyes    Vitreous floaters, bilateral    Myopia of both eyes with astigmatism and presbyopia      1. Educated pt on presence of cataracts and effects on vision. No surgery at this time. Recheck in one year.  2. Monitor condition. Patient to report any changes. RTC 1 year recheck.  3. New Spec Rx given. Different lens options discussed with patient. RTC 1 year full exam.

## 2020-03-02 NOTE — PROGRESS NOTES
Assessment /Plan     For exam results, see Encounter Report.    Fitting and adjustment of spectacles and contact lenses      1. Copy of contact lens rx given.

## 2020-03-11 ENCOUNTER — PATIENT MESSAGE (OUTPATIENT)
Dept: OBSTETRICS AND GYNECOLOGY | Facility: CLINIC | Age: 60
End: 2020-03-11

## 2020-03-11 DIAGNOSIS — Z12.31 SCREENING MAMMOGRAM, ENCOUNTER FOR: Primary | ICD-10-CM

## 2020-03-19 ENCOUNTER — TELEPHONE (OUTPATIENT)
Dept: RADIOLOGY | Facility: HOSPITAL | Age: 60
End: 2020-03-19

## 2020-03-30 ENCOUNTER — PATIENT MESSAGE (OUTPATIENT)
Dept: OBSTETRICS AND GYNECOLOGY | Facility: CLINIC | Age: 60
End: 2020-03-30

## 2020-03-30 ENCOUNTER — PATIENT MESSAGE (OUTPATIENT)
Dept: INTERNAL MEDICINE | Facility: CLINIC | Age: 60
End: 2020-03-30

## 2020-04-30 RX ORDER — DESLORATADINE 5 MG/1
TABLET ORAL
Qty: 90 TABLET | Refills: 3 | Status: SHIPPED | OUTPATIENT
Start: 2020-04-30 | End: 2021-04-13

## 2020-09-11 ENCOUNTER — PATIENT MESSAGE (OUTPATIENT)
Dept: INTERNAL MEDICINE | Facility: CLINIC | Age: 60
End: 2020-09-11

## 2020-09-15 ENCOUNTER — PATIENT MESSAGE (OUTPATIENT)
Dept: INTERNAL MEDICINE | Facility: CLINIC | Age: 60
End: 2020-09-15

## 2020-10-05 ENCOUNTER — PATIENT MESSAGE (OUTPATIENT)
Dept: ADMINISTRATIVE | Facility: HOSPITAL | Age: 60
End: 2020-10-05

## 2021-01-04 ENCOUNTER — PATIENT MESSAGE (OUTPATIENT)
Dept: ADMINISTRATIVE | Facility: HOSPITAL | Age: 61
End: 2021-01-04

## 2021-02-17 ENCOUNTER — PATIENT MESSAGE (OUTPATIENT)
Dept: INTERNAL MEDICINE | Facility: CLINIC | Age: 61
End: 2021-02-17

## 2021-02-17 DIAGNOSIS — Z00.00 ANNUAL PHYSICAL EXAM: Primary | ICD-10-CM

## 2021-02-22 ENCOUNTER — HOSPITAL ENCOUNTER (OUTPATIENT)
Dept: RADIOLOGY | Facility: HOSPITAL | Age: 61
Discharge: HOME | End: 2021-02-22
Attending: OBSTETRICS & GYNECOLOGY
Payer: COMMERCIAL

## 2021-02-22 DIAGNOSIS — Z12.31 SCREENING MAMMOGRAM, ENCOUNTER FOR: ICD-10-CM

## 2021-02-22 PROCEDURE — 77067 SCR MAMMO BI INCL CAD: CPT | Mod: TC

## 2021-02-22 PROCEDURE — 77063 BREAST TOMOSYNTHESIS BI: CPT | Mod: 26,,, | Performed by: RADIOLOGY

## 2021-02-22 PROCEDURE — 77067 SCR MAMMO BI INCL CAD: CPT | Mod: 26,,, | Performed by: RADIOLOGY

## 2021-02-22 PROCEDURE — 77067 MAMMO DIGITAL SCREENING BILAT WITH TOMOSYNTHESIS_CAD: ICD-10-PCS | Mod: 26,,, | Performed by: RADIOLOGY

## 2021-02-22 PROCEDURE — 77063 MAMMO DIGITAL SCREENING BILAT WITH TOMOSYNTHESIS_CAD: ICD-10-PCS | Mod: 26,,, | Performed by: RADIOLOGY

## 2021-02-26 ENCOUNTER — LAB VISIT (OUTPATIENT)
Dept: LAB | Facility: HOSPITAL | Age: 61
End: 2021-02-26
Attending: INTERNAL MEDICINE
Payer: COMMERCIAL

## 2021-02-26 DIAGNOSIS — Z00.00 ANNUAL PHYSICAL EXAM: ICD-10-CM

## 2021-02-26 LAB
BASOPHILS # BLD AUTO: 0.05 K/UL (ref 0–0.2)
BASOPHILS NFR BLD: 1 % (ref 0–1.9)
DIFFERENTIAL METHOD: ABNORMAL
EOSINOPHIL # BLD AUTO: 0.1 K/UL (ref 0–0.5)
EOSINOPHIL NFR BLD: 2.3 % (ref 0–8)
ERYTHROCYTE [DISTWIDTH] IN BLOOD BY AUTOMATED COUNT: 12.3 % (ref 11.5–14.5)
HCT VFR BLD AUTO: 47.7 % (ref 37–48.5)
HGB BLD-MCNC: 15.4 G/DL (ref 12–16)
IMM GRANULOCYTES # BLD AUTO: 0.02 K/UL (ref 0–0.04)
IMM GRANULOCYTES NFR BLD AUTO: 0.4 % (ref 0–0.5)
LYMPHOCYTES # BLD AUTO: 1.6 K/UL (ref 1–4.8)
LYMPHOCYTES NFR BLD: 32.6 % (ref 18–48)
MCH RBC QN AUTO: 32 PG (ref 27–31)
MCHC RBC AUTO-ENTMCNC: 32.3 G/DL (ref 32–36)
MCV RBC AUTO: 99 FL (ref 82–98)
MONOCYTES # BLD AUTO: 0.4 K/UL (ref 0.3–1)
MONOCYTES NFR BLD: 8.9 % (ref 4–15)
NEUTROPHILS # BLD AUTO: 2.6 K/UL (ref 1.8–7.7)
NEUTROPHILS NFR BLD: 54.8 % (ref 38–73)
NRBC BLD-RTO: 0 /100 WBC
PLATELET # BLD AUTO: 297 K/UL (ref 150–350)
PMV BLD AUTO: 10.7 FL (ref 9.2–12.9)
RBC # BLD AUTO: 4.81 M/UL (ref 4–5.4)
WBC # BLD AUTO: 4.81 K/UL (ref 3.9–12.7)

## 2021-02-26 PROCEDURE — 36415 COLL VENOUS BLD VENIPUNCTURE: CPT | Mod: PO

## 2021-02-26 PROCEDURE — 80061 LIPID PANEL: CPT

## 2021-02-26 PROCEDURE — 80053 COMPREHEN METABOLIC PANEL: CPT

## 2021-02-26 PROCEDURE — 85025 COMPLETE CBC W/AUTO DIFF WBC: CPT

## 2021-02-26 PROCEDURE — 82306 VITAMIN D 25 HYDROXY: CPT

## 2021-02-26 PROCEDURE — 84443 ASSAY THYROID STIM HORMONE: CPT

## 2021-02-27 LAB
25(OH)D3+25(OH)D2 SERPL-MCNC: 61 NG/ML (ref 30–96)
ALBUMIN SERPL BCP-MCNC: 4.5 G/DL (ref 3.5–5.2)
ALP SERPL-CCNC: 69 U/L (ref 55–135)
ALT SERPL W/O P-5'-P-CCNC: 26 U/L (ref 10–44)
ANION GAP SERPL CALC-SCNC: 15 MMOL/L (ref 8–16)
AST SERPL-CCNC: 23 U/L (ref 10–40)
BILIRUB SERPL-MCNC: 0.5 MG/DL (ref 0.1–1)
BUN SERPL-MCNC: 16 MG/DL (ref 6–20)
CALCIUM SERPL-MCNC: 9.3 MG/DL (ref 8.7–10.5)
CHLORIDE SERPL-SCNC: 106 MMOL/L (ref 95–110)
CHOLEST SERPL-MCNC: 170 MG/DL (ref 120–199)
CHOLEST/HDLC SERPL: 2.5 {RATIO} (ref 2–5)
CO2 SERPL-SCNC: 21 MMOL/L (ref 23–29)
CREAT SERPL-MCNC: 0.7 MG/DL (ref 0.5–1.4)
EST. GFR  (AFRICAN AMERICAN): >60 ML/MIN/1.73 M^2
EST. GFR  (NON AFRICAN AMERICAN): >60 ML/MIN/1.73 M^2
GLUCOSE SERPL-MCNC: 100 MG/DL (ref 70–110)
HDLC SERPL-MCNC: 69 MG/DL (ref 40–75)
HDLC SERPL: 40.6 % (ref 20–50)
LDLC SERPL CALC-MCNC: 84.2 MG/DL (ref 63–159)
NONHDLC SERPL-MCNC: 101 MG/DL
POTASSIUM SERPL-SCNC: 3.8 MMOL/L (ref 3.5–5.1)
PROT SERPL-MCNC: 7.4 G/DL (ref 6–8.4)
SODIUM SERPL-SCNC: 142 MMOL/L (ref 136–145)
TRIGL SERPL-MCNC: 84 MG/DL (ref 30–150)
TSH SERPL DL<=0.005 MIU/L-ACNC: 1.74 UIU/ML (ref 0.4–4)

## 2021-03-05 ENCOUNTER — OFFICE VISIT (OUTPATIENT)
Dept: INTERNAL MEDICINE | Facility: CLINIC | Age: 61
End: 2021-03-05
Payer: COMMERCIAL

## 2021-03-05 VITALS
DIASTOLIC BLOOD PRESSURE: 70 MMHG | BODY MASS INDEX: 28.12 KG/M2 | TEMPERATURE: 98 F | SYSTOLIC BLOOD PRESSURE: 130 MMHG | OXYGEN SATURATION: 98 % | HEIGHT: 64 IN | WEIGHT: 164.69 LBS | HEART RATE: 81 BPM

## 2021-03-05 DIAGNOSIS — Z00.00 ANNUAL PHYSICAL EXAM: Primary | ICD-10-CM

## 2021-03-05 DIAGNOSIS — I63.9 COMPLETED STROKE: ICD-10-CM

## 2021-03-05 DIAGNOSIS — J45.909 ASTHMA, UNSPECIFIED ASTHMA SEVERITY, UNSPECIFIED WHETHER COMPLICATED, UNSPECIFIED WHETHER PERSISTENT: ICD-10-CM

## 2021-03-05 DIAGNOSIS — E78.5 DYSLIPIDEMIA: ICD-10-CM

## 2021-03-05 PROCEDURE — 99999 PR PBB SHADOW E&M-EST. PATIENT-LVL III: ICD-10-PCS | Mod: PBBFAC,,, | Performed by: INTERNAL MEDICINE

## 2021-03-05 PROCEDURE — 99396 PR PREVENTIVE VISIT,EST,40-64: ICD-10-PCS | Mod: S$GLB,,, | Performed by: INTERNAL MEDICINE

## 2021-03-05 PROCEDURE — 99999 PR PBB SHADOW E&M-EST. PATIENT-LVL III: CPT | Mod: PBBFAC,,, | Performed by: INTERNAL MEDICINE

## 2021-03-05 PROCEDURE — 99396 PREV VISIT EST AGE 40-64: CPT | Mod: S$GLB,,, | Performed by: INTERNAL MEDICINE

## 2021-03-05 RX ORDER — ALBUTEROL SULFATE 90 UG/1
AEROSOL, METERED RESPIRATORY (INHALATION)
COMMUNITY
End: 2022-03-08 | Stop reason: SDUPTHER

## 2021-03-05 RX ORDER — ATORVASTATIN CALCIUM 20 MG/1
20 TABLET, FILM COATED ORAL DAILY
Qty: 90 TABLET | Refills: 3 | Status: SHIPPED | OUTPATIENT
Start: 2021-03-05 | End: 2022-03-08 | Stop reason: SDUPTHER

## 2021-03-06 ENCOUNTER — IMMUNIZATION (OUTPATIENT)
Dept: PRIMARY CARE CLINIC | Facility: CLINIC | Age: 61
End: 2021-03-06
Payer: COMMERCIAL

## 2021-03-06 DIAGNOSIS — Z23 NEED FOR VACCINATION: Primary | ICD-10-CM

## 2021-03-06 PROCEDURE — 0001A PR IMMUNIZ ADMIN, SARS-COV-2 COVID-19 VACC, 30MCG/0.3ML, 1ST DOSE: CPT | Mod: CV19,S$GLB,, | Performed by: INTERNAL MEDICINE

## 2021-03-06 PROCEDURE — 91300 PR SARS-COV- 2 COVID-19 VACCINE, NO PRSV, 30MCG/0.3ML, IM: ICD-10-PCS | Mod: S$GLB,,, | Performed by: INTERNAL MEDICINE

## 2021-03-06 PROCEDURE — 91300 PR SARS-COV- 2 COVID-19 VACCINE, NO PRSV, 30MCG/0.3ML, IM: CPT | Mod: S$GLB,,, | Performed by: INTERNAL MEDICINE

## 2021-03-06 PROCEDURE — 0001A PR IMMUNIZ ADMIN, SARS-COV-2 COVID-19 VACC, 30MCG/0.3ML, 1ST DOSE: ICD-10-PCS | Mod: CV19,S$GLB,, | Performed by: INTERNAL MEDICINE

## 2021-03-06 RX ADMIN — Medication 0.3 ML: at 10:03

## 2021-03-08 ENCOUNTER — PATIENT OUTREACH (OUTPATIENT)
Dept: ADMINISTRATIVE | Facility: OTHER | Age: 61
End: 2021-03-08

## 2021-03-08 DIAGNOSIS — Z12.11 ENCOUNTER FOR FIT (FECAL IMMUNOCHEMICAL TEST) SCREENING: Primary | ICD-10-CM

## 2021-03-09 ENCOUNTER — OFFICE VISIT (OUTPATIENT)
Dept: OBSTETRICS AND GYNECOLOGY | Facility: CLINIC | Age: 61
End: 2021-03-09
Payer: COMMERCIAL

## 2021-03-09 VITALS
BODY MASS INDEX: 28.14 KG/M2 | HEIGHT: 64 IN | SYSTOLIC BLOOD PRESSURE: 118 MMHG | DIASTOLIC BLOOD PRESSURE: 76 MMHG | WEIGHT: 164.81 LBS

## 2021-03-09 DIAGNOSIS — Z78.0 POSTMENOPAUSAL: ICD-10-CM

## 2021-03-09 DIAGNOSIS — N95.2 VAGINAL ATROPHY: ICD-10-CM

## 2021-03-09 DIAGNOSIS — Z01.419 ENCOUNTER FOR GYNECOLOGICAL EXAMINATION WITHOUT ABNORMAL FINDING: Primary | ICD-10-CM

## 2021-03-09 PROCEDURE — 88175 CYTOPATH C/V AUTO FLUID REDO: CPT | Performed by: OBSTETRICS & GYNECOLOGY

## 2021-03-09 PROCEDURE — 99999 PR PBB SHADOW E&M-EST. PATIENT-LVL III: CPT | Mod: PBBFAC,,, | Performed by: OBSTETRICS & GYNECOLOGY

## 2021-03-09 PROCEDURE — 99999 PR PBB SHADOW E&M-EST. PATIENT-LVL III: ICD-10-PCS | Mod: PBBFAC,,, | Performed by: OBSTETRICS & GYNECOLOGY

## 2021-03-09 PROCEDURE — 99396 PREV VISIT EST AGE 40-64: CPT | Mod: S$GLB,,, | Performed by: OBSTETRICS & GYNECOLOGY

## 2021-03-09 PROCEDURE — 99396 PR PREVENTIVE VISIT,EST,40-64: ICD-10-PCS | Mod: S$GLB,,, | Performed by: OBSTETRICS & GYNECOLOGY

## 2021-03-15 LAB
FINAL PATHOLOGIC DIAGNOSIS: NORMAL
Lab: NORMAL

## 2021-03-27 ENCOUNTER — IMMUNIZATION (OUTPATIENT)
Dept: PRIMARY CARE CLINIC | Facility: CLINIC | Age: 61
End: 2021-03-27
Payer: COMMERCIAL

## 2021-03-27 DIAGNOSIS — Z23 NEED FOR VACCINATION: Primary | ICD-10-CM

## 2021-03-27 PROCEDURE — 0002A PR IMMUNIZ ADMIN, SARS-COV-2 COVID-19 VACC, 30MCG/0.3ML, 2ND DOSE: CPT | Mod: CV19,S$GLB,, | Performed by: INTERNAL MEDICINE

## 2021-03-27 PROCEDURE — 0002A PR IMMUNIZ ADMIN, SARS-COV-2 COVID-19 VACC, 30MCG/0.3ML, 2ND DOSE: ICD-10-PCS | Mod: CV19,S$GLB,, | Performed by: INTERNAL MEDICINE

## 2021-03-27 PROCEDURE — 91300 PR SARS-COV- 2 COVID-19 VACCINE, NO PRSV, 30MCG/0.3ML, IM: ICD-10-PCS | Mod: S$GLB,,, | Performed by: INTERNAL MEDICINE

## 2021-03-27 PROCEDURE — 91300 PR SARS-COV- 2 COVID-19 VACCINE, NO PRSV, 30MCG/0.3ML, IM: CPT | Mod: S$GLB,,, | Performed by: INTERNAL MEDICINE

## 2021-03-27 RX ADMIN — Medication 0.3 ML: at 10:03

## 2021-03-31 ENCOUNTER — PATIENT MESSAGE (OUTPATIENT)
Dept: INTERNAL MEDICINE | Facility: CLINIC | Age: 61
End: 2021-03-31

## 2021-04-05 ENCOUNTER — PATIENT MESSAGE (OUTPATIENT)
Dept: ADMINISTRATIVE | Facility: HOSPITAL | Age: 61
End: 2021-04-05

## 2021-07-06 ENCOUNTER — PATIENT MESSAGE (OUTPATIENT)
Dept: ADMINISTRATIVE | Facility: HOSPITAL | Age: 61
End: 2021-07-06

## 2021-07-07 ENCOUNTER — PATIENT OUTREACH (OUTPATIENT)
Dept: ADMINISTRATIVE | Facility: HOSPITAL | Age: 61
End: 2021-07-07

## 2021-07-07 DIAGNOSIS — Z12.11 COLON CANCER SCREENING: Primary | ICD-10-CM

## 2021-07-19 ENCOUNTER — PATIENT MESSAGE (OUTPATIENT)
Dept: INTERNAL MEDICINE | Facility: CLINIC | Age: 61
End: 2021-07-19

## 2021-07-19 RX ORDER — ZOSTER VACCINE RECOMBINANT, ADJUVANTED 50 MCG/0.5
0.5 KIT INTRAMUSCULAR ONCE
Qty: 1 EACH | Refills: 1 | Status: SHIPPED | OUTPATIENT
Start: 2021-07-19 | End: 2021-07-19

## 2021-07-28 LAB — NONINV COLON CA DNA+OCC BLD SCRN STL QL: NEGATIVE

## 2021-08-17 ENCOUNTER — PATIENT MESSAGE (OUTPATIENT)
Dept: INTERNAL MEDICINE | Facility: CLINIC | Age: 61
End: 2021-08-17

## 2021-10-04 ENCOUNTER — PATIENT MESSAGE (OUTPATIENT)
Dept: ADMINISTRATIVE | Facility: HOSPITAL | Age: 61
End: 2021-10-04

## 2021-10-11 ENCOUNTER — PATIENT OUTREACH (OUTPATIENT)
Dept: ADMINISTRATIVE | Facility: HOSPITAL | Age: 61
End: 2021-10-11

## 2021-10-14 RX ORDER — MONTELUKAST SODIUM 10 MG/1
TABLET ORAL
Qty: 90 TABLET | Refills: 3 | Status: SHIPPED | OUTPATIENT
Start: 2021-10-14 | End: 2022-03-08 | Stop reason: SDUPTHER

## 2021-10-22 ENCOUNTER — IMMUNIZATION (OUTPATIENT)
Dept: INTERNAL MEDICINE | Facility: CLINIC | Age: 61
End: 2021-10-22
Payer: COMMERCIAL

## 2021-10-22 DIAGNOSIS — Z23 NEED FOR VACCINATION: Primary | ICD-10-CM

## 2021-10-22 PROCEDURE — 91300 COVID-19, MRNA, LNP-S, PF, 30 MCG/0.3 ML DOSE VACCINE: CPT | Mod: PBBFAC | Performed by: INTERNAL MEDICINE

## 2021-10-22 PROCEDURE — 0003A COVID-19, MRNA, LNP-S, PF, 30 MCG/0.3 ML DOSE VACCINE: CPT | Mod: CV19,PBBFAC | Performed by: INTERNAL MEDICINE

## 2021-11-08 ENCOUNTER — PATIENT MESSAGE (OUTPATIENT)
Dept: INTERNAL MEDICINE | Facility: CLINIC | Age: 61
End: 2021-11-08
Payer: COMMERCIAL

## 2021-11-15 ENCOUNTER — PATIENT MESSAGE (OUTPATIENT)
Dept: INTERNAL MEDICINE | Facility: CLINIC | Age: 61
End: 2021-11-15
Payer: COMMERCIAL

## 2021-12-06 ENCOUNTER — OFFICE VISIT (OUTPATIENT)
Dept: INTERNAL MEDICINE | Facility: CLINIC | Age: 61
End: 2021-12-06
Payer: COMMERCIAL

## 2021-12-06 VITALS
SYSTOLIC BLOOD PRESSURE: 110 MMHG | DIASTOLIC BLOOD PRESSURE: 78 MMHG | BODY MASS INDEX: 27.85 KG/M2 | HEIGHT: 64 IN | HEART RATE: 78 BPM | WEIGHT: 163.13 LBS

## 2021-12-06 DIAGNOSIS — E66.3 OVERWEIGHT (BMI 25.0-29.9): ICD-10-CM

## 2021-12-06 DIAGNOSIS — M62.830 MUSCLE SPASM OF BACK: Primary | ICD-10-CM

## 2021-12-06 PROCEDURE — 99214 OFFICE O/P EST MOD 30 MIN: CPT | Mod: S$GLB,,, | Performed by: NURSE PRACTITIONER

## 2021-12-06 PROCEDURE — 99999 PR PBB SHADOW E&M-EST. PATIENT-LVL III: ICD-10-PCS | Mod: PBBFAC,,, | Performed by: NURSE PRACTITIONER

## 2021-12-06 PROCEDURE — 99214 PR OFFICE/OUTPT VISIT, EST, LEVL IV, 30-39 MIN: ICD-10-PCS | Mod: S$GLB,,, | Performed by: NURSE PRACTITIONER

## 2021-12-06 PROCEDURE — 99999 PR PBB SHADOW E&M-EST. PATIENT-LVL III: CPT | Mod: PBBFAC,,, | Performed by: NURSE PRACTITIONER

## 2021-12-06 RX ORDER — TIZANIDINE 2 MG/1
4 TABLET ORAL EVERY 8 HOURS PRN
Qty: 30 TABLET | Refills: 0 | Status: SHIPPED | OUTPATIENT
Start: 2021-12-06 | End: 2022-03-08

## 2022-01-07 ENCOUNTER — LAB VISIT (OUTPATIENT)
Dept: PRIMARY CARE CLINIC | Facility: CLINIC | Age: 62
End: 2022-01-07
Payer: COMMERCIAL

## 2022-01-07 ENCOUNTER — PATIENT MESSAGE (OUTPATIENT)
Dept: ADMINISTRATIVE | Facility: OTHER | Age: 62
End: 2022-01-07
Payer: COMMERCIAL

## 2022-01-07 DIAGNOSIS — Z20.822 CONTACT WITH AND (SUSPECTED) EXPOSURE TO COVID-19: ICD-10-CM

## 2022-01-07 LAB
CTP QC/QA: YES
SARS-COV-2 AG RESP QL IA.RAPID: NEGATIVE

## 2022-01-07 PROCEDURE — 87811 SARS-COV-2 COVID19 W/OPTIC: CPT

## 2022-01-12 ENCOUNTER — LAB VISIT (OUTPATIENT)
Dept: PRIMARY CARE CLINIC | Facility: CLINIC | Age: 62
End: 2022-01-12
Payer: COMMERCIAL

## 2022-01-12 DIAGNOSIS — Z20.822 CONTACT WITH AND (SUSPECTED) EXPOSURE TO COVID-19: ICD-10-CM

## 2022-01-12 LAB
CTP QC/QA: YES
SARS-COV-2 AG RESP QL IA.RAPID: POSITIVE

## 2022-01-12 PROCEDURE — 87811 SARS-COV-2 COVID19 W/OPTIC: CPT

## 2022-02-07 ENCOUNTER — TELEPHONE (OUTPATIENT)
Dept: OBSTETRICS AND GYNECOLOGY | Facility: CLINIC | Age: 62
End: 2022-02-07
Payer: COMMERCIAL

## 2022-02-07 DIAGNOSIS — Z12.31 SCREENING MAMMOGRAM, ENCOUNTER FOR: Primary | ICD-10-CM

## 2022-02-22 NOTE — PROGRESS NOTES
Subjective:       Patient ID: Marilee Mckee is a 61 y.o. female.    Chief Complaint: Annual Exam    Patient is a 61 y.o.female who presents today for annual.  Cholesterol: reviewed  Mammogram: march 2022  Eye: up to date  Gyn exam: appt this month for pap  Colonoscopy: 2012; due in June 2022  Exercise: walks 4-6 miles per day  Diet: healthy; lean meats  Derm : up to date  Review of Systems   Constitutional: Negative for appetite change, chills, diaphoresis and fever.   HENT: Negative for congestion, ear discharge, ear pain, postnasal drip, tinnitus, trouble swallowing and voice change.    Eyes: Negative for discharge, redness and itching.   Respiratory: Negative for cough, chest tightness, shortness of breath and wheezing.    Cardiovascular: Negative for chest pain, palpitations and leg swelling.   Gastrointestinal: Negative for abdominal pain, constipation, diarrhea, nausea and vomiting.   Endocrine: Negative for cold intolerance and heat intolerance.   Genitourinary: Negative for difficulty urinating, flank pain, hematuria and urgency.   Musculoskeletal: Negative for arthralgias, gait problem, myalgias and neck stiffness.   Skin: Negative for color change and rash.   Neurological: Negative for dizziness, seizures, syncope and headaches.   Hematological: Negative for adenopathy.   Psychiatric/Behavioral: Negative for agitation, behavioral problems, confusion and sleep disturbance.       Objective:      Physical Exam  Vitals and nursing note reviewed.   Constitutional:       General: She is not in acute distress.     Appearance: She is well-developed. She is not diaphoretic.   HENT:      Head: Normocephalic and atraumatic.      Right Ear: External ear normal.      Left Ear: External ear normal.      Nose: Nose normal.      Mouth/Throat:      Pharynx: No oropharyngeal exudate.   Eyes:      General: No scleral icterus.        Right eye: No discharge.         Left eye: No discharge.      Conjunctiva/sclera:  Conjunctivae normal.      Pupils: Pupils are equal, round, and reactive to light.   Neck:      Thyroid: No thyromegaly.      Vascular: No JVD.      Trachea: No tracheal deviation.   Cardiovascular:      Rate and Rhythm: Normal rate.      Heart sounds: Normal heart sounds. No murmur heard.    No friction rub. No gallop.   Pulmonary:      Effort: Pulmonary effort is normal. No respiratory distress.      Breath sounds: Normal breath sounds. No stridor. No wheezing or rales.   Chest:      Chest wall: No tenderness.   Abdominal:      General: Bowel sounds are normal. There is no distension.      Palpations: Abdomen is soft.      Tenderness: There is no abdominal tenderness. There is no rebound.   Musculoskeletal:         General: No tenderness.      Cervical back: Neck supple.   Lymphadenopathy:      Cervical: No cervical adenopathy.   Skin:     General: Skin is warm and dry.      Findings: No erythema or rash.   Neurological:      Mental Status: She is alert and oriented to person, place, and time.   Psychiatric:         Behavior: Behavior normal.         Assessment and Plan:       1. Annual physical exam  Labs reviewed    2. Asthma, unspecified asthma severity, unspecified whether complicated, unspecified whether persistent  Controlled; uses inhaler very rarely    3. Dyslipidemia  Stable; controlled    4. Gastroesophageal reflux disease without esophagitis  stable    5. Screen for colon cancer    - Case Request Endoscopy: COLONOSCOPY          No follow-ups on file.

## 2022-03-02 ENCOUNTER — HOSPITAL ENCOUNTER (OUTPATIENT)
Dept: RADIOLOGY | Facility: HOSPITAL | Age: 62
Discharge: HOME | End: 2022-03-02
Attending: OBSTETRICS & GYNECOLOGY
Payer: COMMERCIAL

## 2022-03-02 DIAGNOSIS — Z12.31 SCREENING MAMMOGRAM, ENCOUNTER FOR: ICD-10-CM

## 2022-03-02 PROCEDURE — 77067 MAMMO DIGITAL SCREENING BILAT WITH TOMO: ICD-10-PCS | Mod: 26,,, | Performed by: RADIOLOGY

## 2022-03-02 PROCEDURE — 77067 SCR MAMMO BI INCL CAD: CPT | Mod: 26,,, | Performed by: RADIOLOGY

## 2022-03-02 PROCEDURE — 77063 MAMMO DIGITAL SCREENING BILAT WITH TOMO: ICD-10-PCS | Mod: 26,,, | Performed by: RADIOLOGY

## 2022-03-02 PROCEDURE — 77063 BREAST TOMOSYNTHESIS BI: CPT | Mod: TC

## 2022-03-02 PROCEDURE — 77063 BREAST TOMOSYNTHESIS BI: CPT | Mod: 26,,, | Performed by: RADIOLOGY

## 2022-03-02 PROCEDURE — 77067 SCR MAMMO BI INCL CAD: CPT | Mod: TC

## 2022-03-06 ENCOUNTER — PATIENT MESSAGE (OUTPATIENT)
Dept: INTERNAL MEDICINE | Facility: CLINIC | Age: 62
End: 2022-03-06
Payer: COMMERCIAL

## 2022-03-06 DIAGNOSIS — Z00.00 ANNUAL PHYSICAL EXAM: Primary | ICD-10-CM

## 2022-03-07 ENCOUNTER — LAB VISIT (OUTPATIENT)
Dept: LAB | Facility: HOSPITAL | Age: 62
End: 2022-03-07
Attending: INTERNAL MEDICINE
Payer: COMMERCIAL

## 2022-03-07 DIAGNOSIS — Z00.00 ANNUAL PHYSICAL EXAM: ICD-10-CM

## 2022-03-07 LAB
25(OH)D3+25(OH)D2 SERPL-MCNC: 54 NG/ML (ref 30–96)
ALBUMIN SERPL BCP-MCNC: 4.2 G/DL (ref 3.5–5.2)
ALP SERPL-CCNC: 67 U/L (ref 55–135)
ALT SERPL W/O P-5'-P-CCNC: 32 U/L (ref 10–44)
ANION GAP SERPL CALC-SCNC: 12 MMOL/L (ref 8–16)
AST SERPL-CCNC: 26 U/L (ref 10–40)
BASOPHILS # BLD AUTO: 0.04 K/UL (ref 0–0.2)
BASOPHILS NFR BLD: 0.8 % (ref 0–1.9)
BILIRUB SERPL-MCNC: 0.7 MG/DL (ref 0.1–1)
BUN SERPL-MCNC: 14 MG/DL (ref 8–23)
CALCIUM SERPL-MCNC: 9.5 MG/DL (ref 8.7–10.5)
CHLORIDE SERPL-SCNC: 105 MMOL/L (ref 95–110)
CHOLEST SERPL-MCNC: 165 MG/DL (ref 120–199)
CHOLEST/HDLC SERPL: 2.5 {RATIO} (ref 2–5)
CO2 SERPL-SCNC: 24 MMOL/L (ref 23–29)
CREAT SERPL-MCNC: 0.7 MG/DL (ref 0.5–1.4)
DIFFERENTIAL METHOD: ABNORMAL
EOSINOPHIL # BLD AUTO: 0.1 K/UL (ref 0–0.5)
EOSINOPHIL NFR BLD: 2.4 % (ref 0–8)
ERYTHROCYTE [DISTWIDTH] IN BLOOD BY AUTOMATED COUNT: 12.5 % (ref 11.5–14.5)
EST. GFR  (AFRICAN AMERICAN): >60 ML/MIN/1.73 M^2
EST. GFR  (NON AFRICAN AMERICAN): >60 ML/MIN/1.73 M^2
GLUCOSE SERPL-MCNC: 96 MG/DL (ref 70–110)
HCT VFR BLD AUTO: 47.7 % (ref 37–48.5)
HDLC SERPL-MCNC: 67 MG/DL (ref 40–75)
HDLC SERPL: 40.6 % (ref 20–50)
HGB BLD-MCNC: 15.8 G/DL (ref 12–16)
IMM GRANULOCYTES # BLD AUTO: 0.01 K/UL (ref 0–0.04)
IMM GRANULOCYTES NFR BLD AUTO: 0.2 % (ref 0–0.5)
LDLC SERPL CALC-MCNC: 82.8 MG/DL (ref 63–159)
LYMPHOCYTES # BLD AUTO: 2.2 K/UL (ref 1–4.8)
LYMPHOCYTES NFR BLD: 40.3 % (ref 18–48)
MCH RBC QN AUTO: 32 PG (ref 27–31)
MCHC RBC AUTO-ENTMCNC: 33.1 G/DL (ref 32–36)
MCV RBC AUTO: 97 FL (ref 82–98)
MONOCYTES # BLD AUTO: 0.6 K/UL (ref 0.3–1)
MONOCYTES NFR BLD: 10.5 % (ref 4–15)
NEUTROPHILS # BLD AUTO: 2.4 K/UL (ref 1.8–7.7)
NEUTROPHILS NFR BLD: 45.8 % (ref 38–73)
NONHDLC SERPL-MCNC: 98 MG/DL
NRBC BLD-RTO: 0 /100 WBC
PLATELET # BLD AUTO: 281 K/UL (ref 150–450)
PMV BLD AUTO: 10.6 FL (ref 9.2–12.9)
POTASSIUM SERPL-SCNC: 4.1 MMOL/L (ref 3.5–5.1)
PROT SERPL-MCNC: 7 G/DL (ref 6–8.4)
RBC # BLD AUTO: 4.94 M/UL (ref 4–5.4)
SODIUM SERPL-SCNC: 141 MMOL/L (ref 136–145)
T4 FREE SERPL-MCNC: 0.78 NG/DL (ref 0.71–1.51)
TRIGL SERPL-MCNC: 76 MG/DL (ref 30–150)
TSH SERPL DL<=0.005 MIU/L-ACNC: 4.28 UIU/ML (ref 0.4–4)
WBC # BLD AUTO: 5.33 K/UL (ref 3.9–12.7)

## 2022-03-07 PROCEDURE — 82306 VITAMIN D 25 HYDROXY: CPT | Performed by: INTERNAL MEDICINE

## 2022-03-07 PROCEDURE — 85025 COMPLETE CBC W/AUTO DIFF WBC: CPT | Performed by: INTERNAL MEDICINE

## 2022-03-07 PROCEDURE — 84439 ASSAY OF FREE THYROXINE: CPT | Performed by: INTERNAL MEDICINE

## 2022-03-07 PROCEDURE — 80053 COMPREHEN METABOLIC PANEL: CPT | Performed by: INTERNAL MEDICINE

## 2022-03-07 PROCEDURE — 84443 ASSAY THYROID STIM HORMONE: CPT | Performed by: INTERNAL MEDICINE

## 2022-03-07 PROCEDURE — 36415 COLL VENOUS BLD VENIPUNCTURE: CPT | Mod: PO | Performed by: INTERNAL MEDICINE

## 2022-03-07 PROCEDURE — 80061 LIPID PANEL: CPT | Performed by: INTERNAL MEDICINE

## 2022-03-08 ENCOUNTER — OFFICE VISIT (OUTPATIENT)
Dept: INTERNAL MEDICINE | Facility: CLINIC | Age: 62
End: 2022-03-08
Payer: COMMERCIAL

## 2022-03-08 VITALS
SYSTOLIC BLOOD PRESSURE: 120 MMHG | DIASTOLIC BLOOD PRESSURE: 74 MMHG | WEIGHT: 164.69 LBS | TEMPERATURE: 98 F | RESPIRATION RATE: 16 BRPM | OXYGEN SATURATION: 98 % | HEART RATE: 69 BPM | BODY MASS INDEX: 28.12 KG/M2 | HEIGHT: 64 IN

## 2022-03-08 DIAGNOSIS — Z00.00 ANNUAL PHYSICAL EXAM: Primary | ICD-10-CM

## 2022-03-08 DIAGNOSIS — J45.909 ASTHMA, UNSPECIFIED ASTHMA SEVERITY, UNSPECIFIED WHETHER COMPLICATED, UNSPECIFIED WHETHER PERSISTENT: ICD-10-CM

## 2022-03-08 DIAGNOSIS — Z12.11 SCREEN FOR COLON CANCER: ICD-10-CM

## 2022-03-08 DIAGNOSIS — K21.9 GASTROESOPHAGEAL REFLUX DISEASE WITHOUT ESOPHAGITIS: ICD-10-CM

## 2022-03-08 DIAGNOSIS — E78.5 DYSLIPIDEMIA: ICD-10-CM

## 2022-03-08 PROCEDURE — 99396 PREV VISIT EST AGE 40-64: CPT | Mod: S$GLB,,, | Performed by: INTERNAL MEDICINE

## 2022-03-08 PROCEDURE — 99999 PR PBB SHADOW E&M-EST. PATIENT-LVL III: ICD-10-PCS | Mod: PBBFAC,,, | Performed by: INTERNAL MEDICINE

## 2022-03-08 PROCEDURE — 99999 PR PBB SHADOW E&M-EST. PATIENT-LVL III: CPT | Mod: PBBFAC,,, | Performed by: INTERNAL MEDICINE

## 2022-03-08 PROCEDURE — 99396 PR PREVENTIVE VISIT,EST,40-64: ICD-10-PCS | Mod: S$GLB,,, | Performed by: INTERNAL MEDICINE

## 2022-03-08 RX ORDER — MONTELUKAST SODIUM 10 MG/1
10 TABLET ORAL NIGHTLY
Qty: 90 TABLET | Refills: 3 | Status: SHIPPED | OUTPATIENT
Start: 2022-03-08 | End: 2023-04-18

## 2022-03-08 RX ORDER — ALBUTEROL SULFATE 90 UG/1
AEROSOL, METERED RESPIRATORY (INHALATION)
Qty: 18 G | Refills: 11 | Status: SHIPPED | OUTPATIENT
Start: 2022-03-08

## 2022-03-08 RX ORDER — ATORVASTATIN CALCIUM 20 MG/1
20 TABLET, FILM COATED ORAL DAILY
Qty: 90 TABLET | Refills: 3 | Status: SHIPPED | OUTPATIENT
Start: 2022-03-08 | End: 2023-04-04 | Stop reason: SDUPTHER

## 2022-03-11 ENCOUNTER — OFFICE VISIT (OUTPATIENT)
Dept: OBSTETRICS AND GYNECOLOGY | Facility: CLINIC | Age: 62
End: 2022-03-11
Payer: COMMERCIAL

## 2022-03-11 VITALS
SYSTOLIC BLOOD PRESSURE: 106 MMHG | HEIGHT: 64 IN | DIASTOLIC BLOOD PRESSURE: 68 MMHG | WEIGHT: 166 LBS | BODY MASS INDEX: 28.34 KG/M2

## 2022-03-11 DIAGNOSIS — Z01.419 ENCOUNTER FOR GYNECOLOGICAL EXAMINATION WITHOUT ABNORMAL FINDING: Primary | ICD-10-CM

## 2022-03-11 DIAGNOSIS — Z78.0 POSTMENOPAUSAL: ICD-10-CM

## 2022-03-11 PROCEDURE — 99999 PR PBB SHADOW E&M-EST. PATIENT-LVL III: ICD-10-PCS | Mod: PBBFAC,,, | Performed by: OBSTETRICS & GYNECOLOGY

## 2022-03-11 PROCEDURE — 99999 PR PBB SHADOW E&M-EST. PATIENT-LVL III: CPT | Mod: PBBFAC,,, | Performed by: OBSTETRICS & GYNECOLOGY

## 2022-03-11 PROCEDURE — 99396 PREV VISIT EST AGE 40-64: CPT | Mod: S$GLB,,, | Performed by: OBSTETRICS & GYNECOLOGY

## 2022-03-11 PROCEDURE — 99396 PR PREVENTIVE VISIT,EST,40-64: ICD-10-PCS | Mod: S$GLB,,, | Performed by: OBSTETRICS & GYNECOLOGY

## 2022-03-11 NOTE — PROGRESS NOTES
CC: Well woman exam    Marilee Mckee is a 61 y.o. female  presents for a well woman exam.  LMP: Patient's last menstrual period was 2012..  No issues, problems, or complaints.      Past Medical History:   Diagnosis Date    Abnormal Pap smear     Asthma     Cataract     DJD (degenerative joint disease) of knee     Fibrocystic breast     GERD (gastroesophageal reflux disease)     Stroke 2015    some memory loss and weakness in right arm     Past Surgical History:   Procedure Laterality Date    BREAST BIOPSY Right     benign    BREAST BIOPSY Left     benign    BREAST SURGERY   and     right side and left side/begin fibroid tumor    CERVIX SURGERY      colpo 2002      radial plate       skin cancers      TONSILLECTOMY      childhood    TONSILLECTOMY, ADENOIDECTOMY      childhood     Social History     Socioeconomic History    Marital status:    Occupational History    Occupation:    Tobacco Use    Smoking status: Former Smoker    Smokeless tobacco: Never Used    Tobacco comment: quit >34 years ago   Substance and Sexual Activity    Alcohol use: Yes     Alcohol/week: 10.0 standard drinks     Types: 10 Glasses of wine per week     Comment: has at least one drink daily/10-12 per week    Drug use: No    Sexual activity: Yes     Partners: Male     Birth control/protection: Post-menopausal   Social History Narrative    Retired from safety professional    Now working on novel writing and blogger     Social Determinants of Health     Financial Resource Strain: Low Risk     Difficulty of Paying Living Expenses: Not hard at all   Food Insecurity: No Food Insecurity    Worried About Running Out of Food in the Last Year: Never true    Ran Out of Food in the Last Year: Never true   Transportation Needs: No Transportation Needs    Lack of Transportation (Medical): No    Lack of Transportation (Non-Medical): No   Physical Activity: Sufficiently  "Active    Days of Exercise per Week: 5 days    Minutes of Exercise per Session: 60 min   Stress: Stress Concern Present    Feeling of Stress : To some extent   Social Connections: Unknown    Frequency of Communication with Friends and Family: Twice a week    Frequency of Social Gatherings with Friends and Family: Once a week    Active Member of Clubs or Organizations: Yes    Attends Club or Organization Meetings: 1 to 4 times per year    Marital Status:    Housing Stability: Low Risk     Unable to Pay for Housing in the Last Year: No    Number of Places Lived in the Last Year: 1    Unstable Housing in the Last Year: No     Family History   Problem Relation Age of Onset    Stroke Mother     Cataracts Mother     Dementia Mother     Stroke Father     Dementia Father     Diabetes Maternal Aunt     Cancer Maternal Aunt         thyroid cancer    Melanoma Maternal Aunt     Cataracts Maternal Aunt     Heart disease Maternal Uncle     Cataracts Maternal Uncle     Diabetes Maternal Uncle     Heart disease Paternal Uncle     Cancer Paternal Uncle         prostate cancer    Diabetes Paternal Uncle     Heart disease Maternal Grandmother     Diabetes Maternal Grandmother     Heart disease Maternal Grandfather     Heart disease Paternal Grandmother     Heart disease Paternal Grandfather     Cancer Paternal Grandfather         bone cancer    Cancer Paternal Aunt         stomach cancer    Diabetes Paternal Aunt     Glaucoma Sister     Strabismus Sister     Amblyopia Sister     Breast cancer Neg Hx     Colon cancer Neg Hx     Ovarian cancer Neg Hx     Macular degeneration Neg Hx     Retinal detachment Neg Hx      OB History        1    Para   1    Term   0            AB        Living   1       SAB        IAB        Ectopic        Multiple        Live Births   1                 /68   Ht 5' 4" (1.626 m)   Wt 75.3 kg (166 lb 0.1 oz)   LMP 2012   BMI 28.49 " kg/m²       ROS:    ROS:  GENERAL: Denies weight gain or weight loss. Feeling well overall.   SKIN: Denies rash or lesions.   HEAD: Denies head injury or headache.   NODES: Denies enlarged lymph nodes.   CHEST: Denies chest pain or shortness of breath.   CARDIOVASCULAR: Denies palpitations or left sided chest pain.   ABDOMEN: No abdominal pain, constipation, diarrhea, nausea, vomiting or rectal bleeding.   URINARY: No frequency, dysuria, hematuria, or burning on urination.  REPRODUCTIVE: See HPI.   BREASTS: The patient performs breast self-examination and denies pain, lumps, or nipple discharge.   HEMATOLOGIC: No easy bruisability or excessive bleeding.   MUSCULOSKELETAL: Denies joint pain or swelling.   NEUROLOGIC: Denies syncope or weakness.   PSYCHIATRIC: Denies depression, anxiety or mood swings.    PHYSICAL EXAM:    APPEARANCE: Well nourished, well developed, in no acute distress.  AFFECT: WNL, alert and oriented x 3  SKIN: No acne or hirsutism  NECK: Neck symmetric without masses or thyromegaly  NODES: No inguinal, cervical, axillary, or femoral lymph node enlargement  CHEST: Good respiratory effect  ABDOMEN: Soft.  No tenderness or masses.  No hepatosplenomegaly.  No hernias.  BREASTS: Symmetrical, no skin changes or visible lesions.  No palpable masses, nipple discharge bilaterally.  PELVIC: Normal external genitalia without lesions.  Normal hair distribution.  Adequate perineal body, normal urethral meatus.  Vagina moist and well rugated without lesions or discharge.  Cervix pink, without lesions, discharge or tenderness.  No significant cystocele or rectocele.  Bimanual exam shows uterus to be normal size, regular, mobile and nontender.  Adnexa without masses or tenderness.    RECTAL: Rectovaginal exam confirms above with normal sphincter tone, no masses.  EXTREMITIES: No edema.    Diagnosis      ICD-10-CM ICD-9-CM    1. Encounter for gynecological examination without abnormal finding  Z01.419 V72.31    2.  Postmenopausal  Z78.0 V49.81        Patient was counseled today on A.C.S. Pap guidelines and recommendations for yearly pelvic exams, mammograms and monthly self breast exams; to see her PCP for other health maintenance.     Follow up in about 1 year (around 3/11/2023), or if symptoms worsen or fail to improve.

## 2022-04-19 ENCOUNTER — IMMUNIZATION (OUTPATIENT)
Dept: INTERNAL MEDICINE | Facility: CLINIC | Age: 62
End: 2022-04-19
Payer: COMMERCIAL

## 2022-04-19 DIAGNOSIS — Z23 NEED FOR VACCINATION: Primary | ICD-10-CM

## 2022-04-19 PROCEDURE — 91305 COVID-19, MRNA, LNP-S, PF, 30 MCG/0.3 ML DOSE VACCINE (PFIZER): CPT | Mod: PBBFAC | Performed by: INTERNAL MEDICINE

## 2022-09-27 ENCOUNTER — IMMUNIZATION (OUTPATIENT)
Dept: INTERNAL MEDICINE | Facility: CLINIC | Age: 62
End: 2022-09-27
Payer: COMMERCIAL

## 2022-09-27 DIAGNOSIS — Z23 NEED FOR VACCINATION: Primary | ICD-10-CM

## 2022-09-27 PROCEDURE — 0124A COVID-19, MRNA, LNP-S, BIVALENT BOOSTER, PF, 30 MCG/0.3 ML DOSE: CPT | Mod: CV19,PBBFAC | Performed by: INTERNAL MEDICINE

## 2022-09-27 PROCEDURE — 91312 COVID-19, MRNA, LNP-S, BIVALENT BOOSTER, PF, 30 MCG/0.3 ML DOSE: CPT | Mod: S$GLB,,, | Performed by: INTERNAL MEDICINE

## 2022-09-27 PROCEDURE — 91312 COVID-19, MRNA, LNP-S, BIVALENT BOOSTER, PF, 30 MCG/0.3 ML DOSE: ICD-10-PCS | Mod: S$GLB,,, | Performed by: INTERNAL MEDICINE

## 2022-09-29 DIAGNOSIS — Z12.11 SCREENING FOR COLON CANCER: Primary | ICD-10-CM

## 2022-12-23 ENCOUNTER — CLINICAL SUPPORT (OUTPATIENT)
Dept: ENDOSCOPY | Facility: HOSPITAL | Age: 62
End: 2022-12-23
Attending: INTERNAL MEDICINE
Payer: COMMERCIAL

## 2022-12-23 VITALS — HEIGHT: 64 IN | WEIGHT: 155 LBS | BODY MASS INDEX: 26.46 KG/M2

## 2022-12-23 DIAGNOSIS — Z12.11 SCREENING FOR COLON CANCER: ICD-10-CM

## 2022-12-23 RX ORDER — SOD SULF/POT CHLORIDE/MAG SULF 1.479 G
12 TABLET ORAL DAILY
Qty: 24 TABLET | Refills: 0 | Status: SHIPPED | OUTPATIENT
Start: 2022-12-23 | End: 2023-01-05

## 2023-01-05 ENCOUNTER — OFFICE VISIT (OUTPATIENT)
Dept: PRIMARY CARE CLINIC | Facility: CLINIC | Age: 63
End: 2023-01-05
Payer: COMMERCIAL

## 2023-01-05 VITALS
WEIGHT: 172.38 LBS | HEART RATE: 74 BPM | DIASTOLIC BLOOD PRESSURE: 86 MMHG | HEIGHT: 64 IN | SYSTOLIC BLOOD PRESSURE: 130 MMHG | TEMPERATURE: 98 F | OXYGEN SATURATION: 97 % | BODY MASS INDEX: 29.43 KG/M2

## 2023-01-05 DIAGNOSIS — M54.50 LOW BACK PAIN, UNSPECIFIED BACK PAIN LATERALITY, UNSPECIFIED CHRONICITY, UNSPECIFIED WHETHER SCIATICA PRESENT: ICD-10-CM

## 2023-01-05 DIAGNOSIS — M25.551 CHRONIC RIGHT HIP PAIN: Primary | ICD-10-CM

## 2023-01-05 DIAGNOSIS — Z78.9 IMPAIRED MOBILITY AND ADLS: ICD-10-CM

## 2023-01-05 DIAGNOSIS — Z74.09 IMPAIRED MOBILITY AND ADLS: ICD-10-CM

## 2023-01-05 DIAGNOSIS — G89.29 CHRONIC RIGHT HIP PAIN: Primary | ICD-10-CM

## 2023-01-05 PROCEDURE — 99213 OFFICE O/P EST LOW 20 MIN: CPT | Mod: S$GLB,,, | Performed by: FAMILY MEDICINE

## 2023-01-05 PROCEDURE — 99999 PR PBB SHADOW E&M-EST. PATIENT-LVL V: CPT | Mod: PBBFAC,,, | Performed by: FAMILY MEDICINE

## 2023-01-05 PROCEDURE — 99213 PR OFFICE/OUTPT VISIT, EST, LEVL III, 20-29 MIN: ICD-10-PCS | Mod: S$GLB,,, | Performed by: FAMILY MEDICINE

## 2023-01-05 PROCEDURE — 99999 PR PBB SHADOW E&M-EST. PATIENT-LVL V: ICD-10-PCS | Mod: PBBFAC,,, | Performed by: FAMILY MEDICINE

## 2023-01-05 RX ORDER — DICLOFENAC SODIUM 75 MG/1
75 TABLET, DELAYED RELEASE ORAL 2 TIMES DAILY
Qty: 20 TABLET | Refills: 0 | Status: ON HOLD | OUTPATIENT
Start: 2023-01-05 | End: 2024-02-01

## 2023-01-05 RX ORDER — CYCLOBENZAPRINE HCL 5 MG
5 TABLET ORAL NIGHTLY
Qty: 30 TABLET | Refills: 0 | Status: ON HOLD | OUTPATIENT
Start: 2023-01-05 | End: 2024-02-01

## 2023-01-05 RX ORDER — METHOCARBAMOL 500 MG/1
500 TABLET, FILM COATED ORAL 3 TIMES DAILY
Qty: 30 TABLET | Refills: 1 | Status: ON HOLD | OUTPATIENT
Start: 2023-01-05 | End: 2024-02-01

## 2023-01-06 ENCOUNTER — HOSPITAL ENCOUNTER (OUTPATIENT)
Dept: RADIOLOGY | Facility: HOSPITAL | Age: 63
Discharge: HOME | End: 2023-01-06
Attending: FAMILY MEDICINE
Payer: COMMERCIAL

## 2023-01-06 DIAGNOSIS — M54.50 LOW BACK PAIN, UNSPECIFIED BACK PAIN LATERALITY, UNSPECIFIED CHRONICITY, UNSPECIFIED WHETHER SCIATICA PRESENT: ICD-10-CM

## 2023-01-06 DIAGNOSIS — G89.29 CHRONIC RIGHT HIP PAIN: ICD-10-CM

## 2023-01-06 DIAGNOSIS — M25.551 CHRONIC RIGHT HIP PAIN: ICD-10-CM

## 2023-01-06 PROCEDURE — 73502 X-RAY EXAM HIP UNI 2-3 VIEWS: CPT | Mod: TC,PO,RT

## 2023-01-06 PROCEDURE — 72100 XR LUMBAR SPINE AP AND LATERAL: ICD-10-PCS | Mod: 26,,, | Performed by: RADIOLOGY

## 2023-01-06 PROCEDURE — 73502 XR HIP WITH PELVIS WHEN PERFORMED, 2 OR 3  VIEWS RIGHT: ICD-10-PCS | Mod: 26,RT,, | Performed by: RADIOLOGY

## 2023-01-06 PROCEDURE — 72100 X-RAY EXAM L-S SPINE 2/3 VWS: CPT | Mod: TC,PO

## 2023-01-06 PROCEDURE — 73502 X-RAY EXAM HIP UNI 2-3 VIEWS: CPT | Mod: 26,RT,, | Performed by: RADIOLOGY

## 2023-01-06 PROCEDURE — 72100 X-RAY EXAM L-S SPINE 2/3 VWS: CPT | Mod: 26,,, | Performed by: RADIOLOGY

## 2023-01-18 ENCOUNTER — CLINICAL SUPPORT (OUTPATIENT)
Dept: REHABILITATION | Facility: HOSPITAL | Age: 63
End: 2023-01-18
Attending: FAMILY MEDICINE
Payer: COMMERCIAL

## 2023-01-18 DIAGNOSIS — M25.551 CHRONIC RIGHT HIP PAIN: ICD-10-CM

## 2023-01-18 DIAGNOSIS — M47.816 FACET ARTHROPATHY, LUMBAR: ICD-10-CM

## 2023-01-18 DIAGNOSIS — G89.29 CHRONIC RIGHT HIP PAIN: ICD-10-CM

## 2023-01-18 DIAGNOSIS — R29.898 IMPAIRED STRENGTH OF HIP MUSCLES: ICD-10-CM

## 2023-01-18 DIAGNOSIS — M54.50 LOW BACK PAIN, UNSPECIFIED BACK PAIN LATERALITY, UNSPECIFIED CHRONICITY, UNSPECIFIED WHETHER SCIATICA PRESENT: ICD-10-CM

## 2023-01-18 DIAGNOSIS — R29.898 DECREASED STRENGTH OF TRUNK AND BACK: ICD-10-CM

## 2023-01-18 PROCEDURE — 97161 PT EVAL LOW COMPLEX 20 MIN: CPT | Mod: PO

## 2023-01-18 NOTE — PLAN OF CARE
[facility]   Physical Therapy Initial Evaluation     Date: 1/18/2023   Name: Marilee BrooksClair  Clinic Number: [id number]    Therapy Diagnosis:   Encounter Diagnoses   Name Primary?    Chronic right hip pain     Low back pain, unspecified back pain laterality, unspecified chronicity, unspecified whether sciatica present     Decreased strength of trunk and back     Impaired strength of hip muscles     Facet arthropathy, lumbar      Physician: Ryan Dexter MD    Physician Orders: PT Eval and Treat   Medical Diagnosis from Referral:   M25.551,G89.29 (ICD-10-CM) - Chronic right hip pain   M54.50 (ICD-10-CM) - Low back pain, unspecified back pain laterality, unspecified chronicity, unspecified whether sciatica present     Evaluation Date: 1/18/2023  Authorization Period Expiration: 1/5/24  Plan of Care Expiration: 3/29/23  Progress Note Due: 2/18/23  Visit # / Visits authorized: 1/ 1   FOTO: please perform at first follow up visit     Precautions: Standard     Time In: 12:15 PM  Time Out: 12:53 PM  Total Appointment Time (timed & untimed codes): 38 minutes      SUBJECTIVE     Date of onset: New years day     History of current condition - Marilee reports: back pain started on New Years day. She came to PT in 2003 for piriformis muscle pain. She woke up on New years day and she felt hip and back pain only on the right side. The pain was familiar so she was not really concerned. On Jan 2, it was worse. She tried doing some stretches but this did not help. That Thursday, she went to the doctor. The doctor said that her issues were moreso the back than the hip. She was given anti-inflammatories and muscle relaxers and had x-rays taken. She denies numbness or tingling into the right leg. Pain starts on the right side of the low back and radiates down to the knee (pain but no tingling).     She was told she had scoliosis when she was pregnant a long time, but was never formally diagnosed.  "    Falls: none     Imaging, see imaging    Prior Therapy: yes for piriformis syndrome, OT previously for wrist and after a stroke   Social History: lives with family, no stairs in home   Occupation: not working  Prior Level of Function: independent   Current Level of Function: difficulty to bathe the dog, she has not been carrying laundry to laundry room, she also had her  help her change the bedding. Otherwise independent in ADLs    Pain:  Current 2/10, worst 8/10, best 0/10   Location: right side low back and hip   Description: Burning  Aggravating Factors: Bending and twisting, standing, laying down  Easing Factors: heating pad and medication, stretching    Patients goals: "I want to not hurt" ;getting back into a good walking and stretching routine to get back to where it is always management      Medical History:   Past Medical History:   Diagnosis Date    Abnormal Pap smear     Asthma     Cataract     DJD (degenerative joint disease) of knee     Fibrocystic breast     GERD (gastroesophageal reflux disease)     Stroke 2015    some memory loss and weakness in right arm       Surgical History:   Marilee Mckee  has a past surgical history that includes Cervix surgery; skin cancers; colpo 2002; Tonsillectomy; TONSILLECTOMY, ADENOIDECTOMY; Breast surgery (1990 and 2001); Breast biopsy (Right, 1990); Breast biopsy (Left, 2001); and radial plate .    Medications:   Marilee has a current medication list which includes the following prescription(s): albuterol, aspirin, atorvastatin, cyclobenzaprine, desloratadine, diclofenac, famotidine, methocarbamol, and montelukast.    Allergies:   Review of patient's allergies indicates:   Allergen Reactions    Tetracyclines Dermatitis    Ceclor [cefaclor] Other (See Comments)     Canker sores    Keflex [cephalexin] Other (See Comments)     Canker sores    Cat dander     Coconut Hives    Shrimp Hives    Tree and shrub pollen           OBJECTIVE     Observation: " "enters clinic without assistive device      Lumbar ROM: %AROM   % Pain   Flexion WFL   Pain on the way back up   Extension   75% no   Left Side Bending WFL    Right Side Bending WFL    Left rotation 75% Yes, back pain   Right Rotation 75% Yes, hip pain       Sensation: intact         Lower Extremity Strength (graded 0-5 out of 5)   RLE LLE   Hip flexion: 4+/5 5/5   Hip ER 4+/5 4+/5   Hip IR 4+/5 5/5   Knee extension: 5/5 5/5   Ankle dorsiflexion: 5/5 5/5   Posterior fibers of Gluteus medius 3+/5  4+/5   Knee flexion 5/5 5/5   Hip extension: 3+/5 (pain) >/=4-/5     Special Tests: ((+): pos.; (-): neg.)   Quadrant test:  +  Slump Test: burning on outside of right thigh  SLR Test: -  Bridge Test: -  Pirformis Test: +R, - L       Palpation for condition: TTP @ right sided lumbar paraspinals, piriformis, and PSIS         Direction Specific:   DKTC: no change   Prone press ups: "hurts at the top of the movement"      Limitation/Restriction for FOTO TBD Survey    Therapist reviewed FOTO scores for Marilee Mckee on 1/18/2023.   FOTO documents entered into [employer] - see Media section.    Limitation Score: TBD%         TREATMENT     N/A      PATIENT EDUCATION AND HOME EXERCISES     Education provided:   - PT Role and POC        ASSESSMENT     Marilee is a 62 y.o. female referred to outpatient Physical Therapy with a medical diagnosis of   M25.551,G89.29 (ICD-10-CM) - Chronic right hip pain   M54.50 (ICD-10-CM) - Low back pain, unspecified back pain laterality, unspecified chronicity, unspecified whether sciatica present   . Patient presents with impaired hip and core strength, impaired soft tissue extensibility of piriformis and lumbar paraspinals, and pain affecting functional capacity. Symptoms could be related to piriformis syndrome of right side vs facet arthropathy.     Patient prognosis is Excellent.   Patient will benefit from skilled outpatient Physical Therapy to address the deficits stated above and in the " chart below, provide patient /family education, and to maximize patientt's level of independence.     Plan of care discussed with patient: Yes  Patient's spiritual, cultural and educational needs considered and patient is agreeable to the plan of care and goals as stated below:     Anticipated Barriers for therapy: scheduling    Medical Necessity is demonstrated by the following  History  Co-morbidities and personal factors that may impact the plan of care Co-morbidities:   Past Medical History:   Diagnosis Date    Abnormal Pap smear     Asthma     Cataract     DJD (degenerative joint disease) of knee     Fibrocystic breast     GERD (gastroesophageal reflux disease)     Stroke 2015    some memory loss and weakness in right arm       Personal Factors:   no deficits     low   Examination  Body Structures and Functions, activity limitations and participation restrictions that may impact the plan of care Body Regions:   back  lower extremities  trunk    Body Systems:    strength  gross coordinated movement  transitions  motor control  motor learning    Participation Restrictions:   none    Activity limitations:   Learning and applying knowledge  no deficits    General Tasks and Commands  no deficits    Communication  no deficits    Mobility  no deficits    Self care  no deficits    Domestic Life  shopping  cooking  doing house work (cleaning house, washing dishes, laundry)    Interactions/Relationships  no deficits    Life Areas  no deficits    Community and Social Life  community life  recreation and leisure         moderate   Clinical Presentation stable and uncomplicated low   Decision Making/ Complexity Score: low     Goals:    Short Term Goals (5 Weeks):  1. Pt will be compliant with HEP to supplement PT in decreasing pain with functional mobility.  2. Pt will perform pallof press with good control to demonstrate improved core strength.  3. Pt to endorse >/=50% improvement in symptoms and pain since start of care  in order to improve upon deficits.   4. Pt will improve impaired LE MMTs by 1/2 muscle grade to improve strength for functional tasks.    Long Term Goals (10 Weeks):   1. Pt to endorse >/=75% improvement in symptoms and pain since start of care in order to improve upon deficits.   2. Pt will perform bridge with good control to demonstrate improved core strength.  3. Pt will improve impaired LE MMTs by 1 muscle grade to improve strength for functional tasks.  4. Pt will report no pain during lumbar ROM to promote functional mobility.  5. Pt will demo neg piriformis test in order to improve symptoms      PLAN   Plan of care Certification: 1/18/2023 to 3/29/23.    Outpatient Physical Therapy 1 times weekly for 10 weeks to include the following interventions: Manual Therapy, Moist Heat/ Ice, Neuromuscular Re-ed, Patient Education, Self Care, Therapeutic Activities, Therapeutic Exercise, and modalities as appropriate.       Please perform FOTO at first follow up visit.    Fatemeh Hutchinson, PT      I CERTIFY THE NEED FOR THESE SERVICES FURNISHED UNDER THIS PLAN OF TREATMENT AND WHILE UNDER MY CARE   Physician's comments:     Physician's Signature: ___________________________________________________

## 2023-01-23 PROBLEM — M25.551 CHRONIC RIGHT HIP PAIN: Status: ACTIVE | Noted: 2023-01-23

## 2023-01-23 PROBLEM — G89.29 CHRONIC RIGHT HIP PAIN: Status: ACTIVE | Noted: 2023-01-23

## 2023-01-23 NOTE — PROGRESS NOTES
"    /86 (BP Location: Left arm, Patient Position: Sitting)   Pulse 74   Temp 98.2 °F (36.8 °C)   Ht 5' 4" (1.626 m)   Wt 78.2 kg (172 lb 6.4 oz)   LMP 04/21/2012   SpO2 97%   BMI 29.59 kg/m²       ===========    Chief Complaint: No chief complaint on file.        Marilee Mckee is a 62 y.o. female here for    HPI     chronic hip pain in the right.  Also some low back pain that is chronic.  No trauma.  Not better with over-the-counter medications.  Has not done physical therapy  Patient queried and denies any further complaints    SURGICAL AND MEDICAL HISTORY: updated and reviewed.  ALLERGIES updated and reviewed.  Review of patient's allergies indicates:   Allergen Reactions    Tetracyclines Dermatitis    Ceclor [cefaclor] Other (See Comments)     Canker sores    Keflex [cephalexin] Other (See Comments)     Canker sores    Cat dander     Coconut Hives    Shrimp Hives    Tree and shrub pollen      CURRENT OUTPATIENT MEDICATIONS updated and reviewed    Current Outpatient Medications:     albuterol (PROVENTIL/VENTOLIN HFA) 90 mcg/actuation inhaler, albuterol sulfate HFA 90 mcg/actuation aerosol inhaler, Disp: 18 g, Rfl: 11    aspirin 81 MG Chew, Take 81 mg by mouth once daily., Disp: , Rfl:     atorvastatin (LIPITOR) 20 MG tablet, Take 1 tablet (20 mg total) by mouth once daily., Disp: 90 tablet, Rfl: 3    desloratadine (CLARINEX) 5 mg tablet, TAKE 1 TABLET BY MOUTH EVERY DAY, Disp: 90 tablet, Rfl: 3    famotidine (PEPCID) 20 MG tablet, , Disp: , Rfl:     montelukast (SINGULAIR) 10 mg tablet, Take 1 tablet (10 mg total) by mouth every evening., Disp: 90 tablet, Rfl: 3    cyclobenzaprine (FLEXERIL) 5 MG tablet, Take 1 tablet (5 mg total) by mouth nightly. 1 po qhs prn severe muscle spasms, Disp: 30 tablet, Rfl: 0    diclofenac (VOLTAREN) 75 MG EC tablet, Take 1 tablet (75 mg total) by mouth 2 (two) times daily. W meal and full glass of water for 3-10 days for moderate pain, Disp: 20 tablet, Rfl: 0    " "methocarbamoL (ROBAXIN) 500 MG Tab, Take 1 tablet (500 mg total) by mouth 3 (three) times daily. W meals for 3-10 days for muscle spasms, Disp: 30 tablet, Rfl: 1    Review of Systems   Constitutional:  Negative for activity change, chills, diaphoresis, fatigue and fever.   HENT:  Negative for congestion, postnasal drip, sinus pressure, sinus pain and sore throat.    Respiratory:  Negative for cough, chest tightness and shortness of breath.    Cardiovascular:  Negative for chest pain and palpitations.   Gastrointestinal:  Negative for abdominal pain, nausea and vomiting.   Genitourinary:  Negative for dysuria.   Musculoskeletal:  Positive for arthralgias and back pain. Negative for myalgias.   Skin:  Negative for rash.   Neurological:  Negative for dizziness, weakness and headaches.   Psychiatric/Behavioral:  Negative for dysphoric mood and sleep disturbance. The patient is not nervous/anxious.      /86 (BP Location: Left arm, Patient Position: Sitting)   Pulse 74   Temp 98.2 °F (36.8 °C)   Ht 5' 4" (1.626 m)   Wt 78.2 kg (172 lb 6.4 oz)   LMP 04/21/2012   SpO2 97%   BMI 29.59 kg/m²   Physical Exam  Vitals and nursing note reviewed.   Constitutional:       General: She is not in acute distress.     Appearance: Normal appearance. She is well-developed. She is not ill-appearing, toxic-appearing or diaphoretic.   HENT:      Head: Normocephalic and atraumatic.      Right Ear: Tympanic membrane, ear canal and external ear normal.      Left Ear: Tympanic membrane, ear canal and external ear normal.      Nose: Nose normal.      Mouth/Throat:      Lips: Pink.      Mouth: Mucous membranes are moist.      Pharynx: No oropharyngeal exudate or posterior oropharyngeal erythema.   Eyes:      General: No scleral icterus.        Right eye: No discharge.         Left eye: No discharge.      Extraocular Movements: Extraocular movements intact.      Conjunctiva/sclera: Conjunctivae normal.   Cardiovascular:      Rate and " Rhythm: Normal rate and regular rhythm.      Pulses: Normal pulses.      Heart sounds: Normal heart sounds. No murmur heard.  Pulmonary:      Effort: Pulmonary effort is normal. No respiratory distress.      Breath sounds: Normal breath sounds. No wheezing or rales.   Abdominal:      General: Bowel sounds are normal. There is no distension.      Palpations: Abdomen is soft. There is no mass.      Tenderness: There is no abdominal tenderness. There is no right CVA tenderness, left CVA tenderness, guarding or rebound.      Hernia: No hernia is present.   Musculoskeletal:      Cervical back: Normal range of motion and neck supple. No rigidity or tenderness.   Lymphadenopathy:      Cervical: No cervical adenopathy.   Skin:     General: Skin is warm and dry.   Neurological:      General: No focal deficit present.      Mental Status: She is alert. Mental status is at baseline.   Psychiatric:         Mood and Affect: Mood normal.         Behavior: Behavior normal. Behavior is cooperative.       Diagnoses and all orders for this visit:    Chronic right hip pain  -     X-Ray Hip 2 or 3 views Right (with Pelvis when performed); Future  -     Ambulatory referral/consult to Physical/Occupational Therapy; Future    Low back pain, unspecified back pain laterality, unspecified chronicity, unspecified whether sciatica present  -     X-Ray Lumbar Spine AP And Lateral; Future  -     Ambulatory referral/consult to Physical/Occupational Therapy; Future    Impaired mobility and ADLs    Other orders  -     diclofenac (VOLTAREN) 75 MG EC tablet; Take 1 tablet (75 mg total) by mouth 2 (two) times daily. W meal and full glass of water for 3-10 days for moderate pain  -     methocarbamoL (ROBAXIN) 500 MG Tab; Take 1 tablet (500 mg total) by mouth 3 (three) times daily. W meals for 3-10 days for muscle spasms  -     cyclobenzaprine (FLEXERIL) 5 MG tablet; Take 1 tablet (5 mg total) by mouth nightly. 1 po qhs prn severe muscle  spasms          Reviewed old pertinent medical records available.     All lab results personally reviewed and interpreted by me including last year, if available.        Ryan Dexter MD

## 2023-01-27 ENCOUNTER — CLINICAL SUPPORT (OUTPATIENT)
Dept: REHABILITATION | Facility: HOSPITAL | Age: 63
End: 2023-01-27
Payer: COMMERCIAL

## 2023-01-27 DIAGNOSIS — R29.898 DECREASED STRENGTH OF TRUNK AND BACK: Primary | ICD-10-CM

## 2023-01-27 DIAGNOSIS — M47.816 FACET ARTHROPATHY, LUMBAR: ICD-10-CM

## 2023-01-27 DIAGNOSIS — R29.898 IMPAIRED STRENGTH OF HIP MUSCLES: ICD-10-CM

## 2023-01-27 PROCEDURE — 97110 THERAPEUTIC EXERCISES: CPT | Mod: PO,CQ

## 2023-01-27 NOTE — PROGRESS NOTES
"[facility]   Physical Therapy Treatment Note     Name: Marilee DONOVAN [facility]  Clinic Number: [id number]    Therapy Diagnosis:        Encounter Diagnoses   Name Primary?    Chronic right hip pain      Low back pain, unspecified back pain laterality, unspecified chronicity, unspecified whether sciatica present      Decreased strength of trunk and back      Impaired strength of hip muscles      Facet arthropathy, lumbar        Physician: Ryan Dexter MD     Physician Orders: PT Eval and Treat   Medical Diagnosis from Referral:   M25.551,G89.29 (ICD-10-CM) - Chronic right hip pain   M54.50 (ICD-10-CM) - Low back pain, unspecified back pain laterality, unspecified chronicity, unspecified whether sciatica present      Evaluation Date: 1/18/2023  Authorization Period Expiration: 1/5/24  Plan of Care Expiration: 3/29/23  Progress Note Due: 2/18/23  Visit # / Visits authorized: 1/ 20   FOTO: 1/3     Precautions: Standard   PTA Visit #: 1/5     Time In: 1:47 pm  Time Out: 2:36 pm   Total Billable Time: 49 minutes    SUBJECTIVE     Pt reports: "I'm feeling pretty good today."  She was compliant with home exercise program.  Response to previous treatment: ongoing  Functional change: ongoing     Pain: 1/10  Location: right hip, low back       OBJECTIVE     Objective Measures updated at progress report unless specified.     Treatment     Marilee received the treatments listed below:      therapeutic exercises to develop strength, ROM, and core stabilization for 49 minutes including:    FOTO  LTR 20x  SKTC 10x5" hold  Piriformis stretch 3x30"  SL clams RTB  TrA with physio ball 2x10 5" hold   Bridges 3x10 5" hold   Squats 10# 2x10   Palloff press GTB 2x10 deepak   DL shuttle 4 bands 2x10   SL shuttle 2.5 bands   Upright bike 6' lvl 2    manual therapy techniques: Joint mobilizations, Manual traction, and Soft tissue Mobilization were applied to the: low back for 00 minutes, including:  NP    Patient " Education and Home Exercises     Home Exercises Provided and Patient Education Provided     Education provided:   - PT Role and POC    Written Home Exercises Provided: yes. Exercises were reviewed and Marilee was able to demonstrate them prior to the end of the session.  Marilee demonstrated good  understanding of the education provided. See EMR under Patient Instructions for exercises provided during therapy sessions    ASSESSMENT     Today's treatment focused on improving lumbar and bilateral hips mobility/strengthening which pt tolerated well without any complaints except muscle fatigue. Pt received HEP today, and was encouraged to perform daily/weekly to address deficits which pt verbalized understanding. Will follow up next visit for tolerance to today's session.     Marilee Is progressing well towards her goals.   Pt prognosis is Good.     Pt will continue to benefit from skilled outpatient physical therapy to address the deficits listed in the problem list box on initial evaluation, provide pt/family education and to maximize pt's level of independence in the home and community environment.     Pt's spiritual, cultural and educational needs considered and pt agreeable to plan of care and goals.     Anticipated barriers to physical therapy: scheduling     Goals: Short Term Goals (5 Weeks):  1. Pt will be compliant with HEP to supplement PT in decreasing pain with functional mobility.  2. Pt will perform pallof press with good control to demonstrate improved core strength.  3. Pt to endorse >/=50% improvement in symptoms and pain since start of care in order to improve upon deficits.   4. Pt will improve impaired LE MMTs by 1/2 muscle grade to improve strength for functional tasks.     Long Term Goals (10 Weeks):   1. Pt to endorse >/=75% improvement in symptoms and pain since start of care in order to improve upon deficits.   2. Pt will perform bridge with good control to demonstrate improved core  strength.  3. Pt will improve impaired LE MMTs by 1 muscle grade to improve strength for functional tasks.  4. Pt will report no pain during lumbar ROM to promote functional mobility.  5. Pt will demo neg piriformis test in order to improve symptoms    PLAN     Plan of care Certification: 1/18/2023 to 3/29/23.     Outpatient Physical Therapy 1 times weekly for 10 weeks to include the following interventions: Manual Therapy, Moist Heat/ Ice, Neuromuscular Re-ed, Patient Education, Self Care, Therapeutic Activities, Therapeutic Exercise, and modalities as appropriate.     Desirae Holm, PTA

## 2023-02-03 ENCOUNTER — TELEPHONE (OUTPATIENT)
Dept: ENDOSCOPY | Facility: HOSPITAL | Age: 63
End: 2023-02-03
Payer: COMMERCIAL

## 2023-02-03 DIAGNOSIS — Z12.11 SCREENING FOR COLON CANCER: Primary | ICD-10-CM

## 2023-02-03 NOTE — TELEPHONE ENCOUNTER
Received message from endo lab that patient would like to reschedule.     Called patient. Patient states she is unsure when she would like to get rescheduled depending on spouse's prognosis. Referral reordered when patient ready to schedule. Patient verbalized an understanding.

## 2023-02-08 ENCOUNTER — PATIENT MESSAGE (OUTPATIENT)
Dept: REHABILITATION | Facility: HOSPITAL | Age: 63
End: 2023-02-08
Payer: COMMERCIAL

## 2023-02-16 ENCOUNTER — PATIENT MESSAGE (OUTPATIENT)
Dept: OBSTETRICS AND GYNECOLOGY | Facility: CLINIC | Age: 63
End: 2023-02-16
Payer: COMMERCIAL

## 2023-02-16 ENCOUNTER — PATIENT MESSAGE (OUTPATIENT)
Dept: REHABILITATION | Facility: HOSPITAL | Age: 63
End: 2023-02-16
Payer: COMMERCIAL

## 2023-02-16 DIAGNOSIS — Z12.31 SCREENING MAMMOGRAM, ENCOUNTER FOR: Primary | ICD-10-CM

## 2023-02-24 ENCOUNTER — CLINICAL SUPPORT (OUTPATIENT)
Dept: REHABILITATION | Facility: HOSPITAL | Age: 63
End: 2023-02-24
Payer: COMMERCIAL

## 2023-02-24 DIAGNOSIS — M47.816 FACET ARTHROPATHY, LUMBAR: ICD-10-CM

## 2023-02-24 DIAGNOSIS — R29.898 IMPAIRED STRENGTH OF HIP MUSCLES: ICD-10-CM

## 2023-02-24 DIAGNOSIS — R29.898 DECREASED STRENGTH OF TRUNK AND BACK: Primary | ICD-10-CM

## 2023-02-24 PROCEDURE — 97530 THERAPEUTIC ACTIVITIES: CPT | Mod: PO,CQ

## 2023-02-24 PROCEDURE — 97112 NEUROMUSCULAR REEDUCATION: CPT | Mod: PO,CQ

## 2023-02-24 PROCEDURE — 97110 THERAPEUTIC EXERCISES: CPT | Mod: PO,CQ

## 2023-02-24 NOTE — PROGRESS NOTES
"[facility]   Physical Therapy Treatment Note     Name: Marilee DONOVAN [facility]  Clinic Number: [id number]    Therapy Diagnosis:        Encounter Diagnoses   Name Primary?    Chronic right hip pain      Low back pain, unspecified back pain laterality, unspecified chronicity, unspecified whether sciatica present      Decreased strength of trunk and back      Impaired strength of hip muscles      Facet arthropathy, lumbar        Physician: Ryan Dexter MD     Physician Orders: PT Eval and Treat   Medical Diagnosis from Referral:   M25.551,G89.29 (ICD-10-CM) - Chronic right hip pain   M54.50 (ICD-10-CM) - Low back pain, unspecified back pain laterality, unspecified chronicity, unspecified whether sciatica present      Evaluation Date: 1/18/2023  Authorization Period Expiration: 1/5/24  Plan of Care Expiration: 3/29/23  Progress Note Due: 2/18/23  Visit # / Visits authorized: 2/ 20   FOTO: 1/3     Precautions: Standard   PTA Visit #: 2/5     Time In: 12:58 pm  Time Out: 1:47  pm   Total Billable Time: 49 minutes    SUBJECTIVE     Pt reports: "I over did it yesterday with carrying groceries inside."  She was compliant with home exercise program.  Response to previous treatment: good  Functional change: ongoing     Pain: 2/10  Location: Low back    OBJECTIVE     Objective Measures updated at progress report unless specified.     Treatment     Marilee received the treatments listed below:      therapeutic exercises to develop strength, ROM, and core stabilization for 27 minutes including:    LTR 3'  SKTC 10x5" hold  Piriformis stretch 3x30" deepak  Modified dead bugs 2x5  SL clams GTB 2x10  Palloff press GTB 2x10 deepak   Upright bike 6' lvl 2    neuromuscular re-education activities to improve: Coordination, Kinesthetic, and Proprioception for 9 minutes. The following activities were included:  PPT 20x5" hold   TrA with physio ball 2x10 5" hold   +Hip hinge 20x    therapeutic activities to improve " "functional performance for 14  minutes, including:  Bridges 3x10 5" hold   +Deadlift 10# 2x10  Squats 10# 2x10   Patient Education and Home Exercises     Home Exercises Provided and Patient Education Provided     Education provided:   - PT Role and POC    Written Home Exercises Provided: yes. Exercises were reviewed and Marilee was able to demonstrate them prior to the end of the session.  Marilee demonstrated good  understanding of the education provided. See EMR under Patient Instructions for exercises provided during therapy sessions    ASSESSMENT     Added new core and bilateral hip strengthening to today's treatment session which pt tolerated well without and adverse effects. Pt required mod cueing while performing deadlifts to hip hinge, however hip hinge with dowel improved pt's form. Pt reported fatigue following todays session, but did not experience increased pain with the activity.    Marilee Is progressing well towards her goals.   Pt prognosis is Good.     Pt will continue to benefit from skilled outpatient physical therapy to address the deficits listed in the problem list box on initial evaluation, provide pt/family education and to maximize pt's level of independence in the home and community environment.     Pt's spiritual, cultural and educational needs considered and pt agreeable to plan of care and goals.     Anticipated barriers to physical therapy: scheduling     Goals: Short Term Goals (5 Weeks):  1. Pt will be compliant with HEP to supplement PT in decreasing pain with functional mobility.  2. Pt will perform pallof press with good control to demonstrate improved core strength.  3. Pt to endorse >/=50% improvement in symptoms and pain since start of care in order to improve upon deficits.   4. Pt will improve impaired LE MMTs by 1/2 muscle grade to improve strength for functional tasks.     Long Term Goals (10 Weeks):   1. Pt to endorse >/=75% improvement in symptoms and pain since " start of care in order to improve upon deficits.   2. Pt will perform bridge with good control to demonstrate improved core strength.  3. Pt will improve impaired LE MMTs by 1 muscle grade to improve strength for functional tasks.  4. Pt will report no pain during lumbar ROM to promote functional mobility.  5. Pt will demo neg piriformis test in order to improve symptoms    PLAN     Plan of care Certification: 1/18/2023 to 3/29/23.     Outpatient Physical Therapy 1 times weekly for 10 weeks to include the following interventions: Manual Therapy, Moist Heat/ Ice, Neuromuscular Re-ed, Patient Education, Self Care, Therapeutic Activities, Therapeutic Exercise, and modalities as appropriate.     Desirae Holm, PTA

## 2023-03-02 ENCOUNTER — CLINICAL SUPPORT (OUTPATIENT)
Dept: REHABILITATION | Facility: HOSPITAL | Age: 63
End: 2023-03-02
Payer: COMMERCIAL

## 2023-03-02 DIAGNOSIS — R29.898 IMPAIRED STRENGTH OF HIP MUSCLES: ICD-10-CM

## 2023-03-02 DIAGNOSIS — M47.816 FACET ARTHROPATHY, LUMBAR: ICD-10-CM

## 2023-03-02 DIAGNOSIS — R29.898 DECREASED STRENGTH OF TRUNK AND BACK: Primary | ICD-10-CM

## 2023-03-02 PROCEDURE — 97110 THERAPEUTIC EXERCISES: CPT | Mod: PO

## 2023-03-02 PROCEDURE — 97530 THERAPEUTIC ACTIVITIES: CPT | Mod: PO

## 2023-03-02 NOTE — PROGRESS NOTES
"[facility]   Physical Therapy Treatment Note / Progress Note    Name: Marilee Mckee  Clinic Number: [id number]    Therapy Diagnosis:        Encounter Diagnoses   Name Primary?    Chronic right hip pain      Low back pain, unspecified back pain laterality, unspecified chronicity, unspecified whether sciatica present      Decreased strength of trunk and back      Impaired strength of hip muscles      Facet arthropathy, lumbar        Physician: Ryan Dexter MD     Physician Orders: PT Eval and Treat   Medical Diagnosis from Referral:   M25.551,G89.29 (ICD-10-CM) - Chronic right hip pain   M54.50 (ICD-10-CM) - Low back pain, unspecified back pain laterality, unspecified chronicity, unspecified whether sciatica present      Evaluation Date: 1/18/2023  Authorization Period Expiration: 12/31/23  Plan of Care Expiration: 3/29/23  Progress Note Due: 3/29/23  Visit # / Visits authorized: 3/ 12   FOTO: 2/3     Precautions: Standard   PTA Visit #: 2/5     Time In:11:00 AM  Time Out: 11:40 AM  Total Billable Time: 40 minutes (TE-2, TA-1)    SUBJECTIVE     Pt reports: her  has stage 4 pancreatic cancer. Back is still hurting. Has not been walking this week "there has been too much life in the way"  She was compliant with home exercise program.  Response to previous treatment: tired, but no increase in pain  Functional change: improved flexibility    Pain: 2/10  Location: bilateral Low back    OBJECTIVE     Observation: enters clinic without assistive device        Lumbar ROM: %AROM    % Pain   Flexion WFL    Pain end range flexion   Extension    75% no   Left Side Bending WFL  yes   Right Side Bending WFL  stretch   Left rotation WFL Yes   Right Rotation WFL no                 Lower Extremity Strength (graded 0-5 out of 5) Bold=updated MMTs from today's reassessment     RLE LLE   Hip flexion: 4+/5 4+/5 5/5 5/5   Hip ER 4+/5 5/5 4+/5 5/5   Hip IR 4+/5 5/5 5/5 5/5   Knee extension: 5/5 " "5/5 5/5 5/5   Ankle dorsiflexion: 5/5 5/5 5/5 5/5   Posterior fibers of Gluteus medius 3+/5 >/=4/5 4+/5 >/=4/5   Knee flexion 5/5 5/5 5/5 5/5   Hip extension: 3+/5 (pain) 4+/5(a little pain) >/=4-/5 4+/5      Special Tests: ((+): pos.; (-): neg.)   Pirformis Test: - bilaterally        FOTO limitation score: 22%    Treatment     Marilee received the treatments listed below:      therapeutic exercises to develop strength, ROM, and core stabilization for 32 minutes including:    Bold=exercises performed today     LTR 3'  SKTC 10x5" hold  Piriformis stretch 3x30" deepak  Modified dead bugs 2x5  SL clams GTB 2x10  Palloff press GTB 2x10 deepak   Upright bike 6' lvl 2  Bridges 2x10 5" hold   Reassessment above   +hip matrix abduction 40# 2x10 each leg  +hip matrix extension 25# 2x10 each leg       neuromuscular re-education activities to improve: Coordination, Kinesthetic, and Proprioception for 00 minutes. The following activities were included:  PPT 20x5" hold   TrA with physio ball 2x10 5" hold     therapeutic activities to improve functional performance for 8 minutes, including:  Deadlift 10# 2x10  +TRX squats 2x10  Squats 10# 2x10 - not performed today    Patient Education and Home Exercises     Home Exercises Provided and Patient Education Provided     Education provided:   - PT Role and POC  - progress to date  - patient educated on all interventions performed today     Written Home Exercises Provided: Patient instructed to cont prior HEP. Exercises were reviewed and Marilee was able to demonstrate them prior to the end of the session.  Marilee demonstrated good  understanding of the education provided. See EMR under Patient Instructions for exercises provided during therapy sessions    ASSESSMENT     Patient has had hiatus in therapy as her  was recently hospitalized. However, today's reassessment shows improved LE strength and core strength since start of care. Piriformis test was positive on the right at " initial eval but is no longer positive today. She does not have pain radiating into the right leg/thigh any longer and reports this has resolved along with the piriformis pain. However, patient will continue to benefit from progressive hip and core strengthening to address deficits, improve symptom management, and improve pain-free function.    Marilee Is progressing well towards her goals.   Pt prognosis is Good.     Pt will continue to benefit from skilled outpatient physical therapy to address the deficits listed in the problem list box on initial evaluation, provide pt/family education and to maximize pt's level of independence in the home and community environment.     Pt's spiritual, cultural and educational needs considered and pt agreeable to plan of care and goals.     Anticipated barriers to physical therapy: scheduling     Goals: Short Term Goals (5 Weeks):  1. Pt will be compliant with HEP to supplement PT in decreasing pain with functional mobility.-MET  2. Pt will perform pallof press with good control to demonstrate improved core strength.-MET  3. Pt to endorse >/=50% improvement in symptoms and pain since start of care in order to improve upon deficits.   4. Pt will improve impaired LE MMTs by 1/2 muscle grade to improve strength for functional tasks.-MET     Long Term Goals (10 Weeks):   1. Pt to endorse >/=75% improvement in symptoms and pain since start of care in order to improve upon deficits.   2. Pt will perform bridge with good control to demonstrate improved core strength.  3. Pt will improve impaired LE MMTs by 1 muscle grade to improve strength for functional tasks.  4. Pt will report no pain during lumbar ROM to promote functional mobility.-progressing  5. Pt will demo neg piriformis test in order to improve symptoms-MET    PLAN     Continue PT POC    Fatemeh Hutchinson, PT

## 2023-03-09 ENCOUNTER — OFFICE VISIT (OUTPATIENT)
Dept: OBSTETRICS AND GYNECOLOGY | Facility: CLINIC | Age: 63
End: 2023-03-09
Payer: COMMERCIAL

## 2023-03-09 VITALS
DIASTOLIC BLOOD PRESSURE: 64 MMHG | BODY MASS INDEX: 28.34 KG/M2 | SYSTOLIC BLOOD PRESSURE: 128 MMHG | WEIGHT: 166 LBS | HEIGHT: 64 IN

## 2023-03-09 DIAGNOSIS — Z01.419 ENCOUNTER FOR GYNECOLOGICAL EXAMINATION WITHOUT ABNORMAL FINDING: Primary | ICD-10-CM

## 2023-03-09 DIAGNOSIS — Z78.0 POSTMENOPAUSAL: ICD-10-CM

## 2023-03-09 PROCEDURE — 99999 PR PBB SHADOW E&M-EST. PATIENT-LVL III: ICD-10-PCS | Mod: PBBFAC,,, | Performed by: OBSTETRICS & GYNECOLOGY

## 2023-03-09 PROCEDURE — 99396 PREV VISIT EST AGE 40-64: CPT | Mod: S$GLB,,, | Performed by: OBSTETRICS & GYNECOLOGY

## 2023-03-09 PROCEDURE — 99999 PR PBB SHADOW E&M-EST. PATIENT-LVL III: CPT | Mod: PBBFAC,,, | Performed by: OBSTETRICS & GYNECOLOGY

## 2023-03-09 PROCEDURE — 99396 PR PREVENTIVE VISIT,EST,40-64: ICD-10-PCS | Mod: S$GLB,,, | Performed by: OBSTETRICS & GYNECOLOGY

## 2023-03-09 PROCEDURE — 88175 CYTOPATH C/V AUTO FLUID REDO: CPT | Performed by: OBSTETRICS & GYNECOLOGY

## 2023-03-09 NOTE — PROGRESS NOTES
CC: Well woman exam    Marilee Mckee is a 62 y.o. female  presents for a well woman exam.  LMP: Patient's last menstrual period was 2012..  No issues, problems, or complaints.    Past Medical History:   Diagnosis Date    Abnormal Pap smear     Asthma     Cataract     DJD (degenerative joint disease) of knee     Fibrocystic breast     GERD (gastroesophageal reflux disease)     Stroke 2015    some memory loss and weakness in right arm     Past Surgical History:   Procedure Laterality Date    BREAST BIOPSY Right     benign    BREAST BIOPSY Left     benign    BREAST SURGERY   and     right side and left side/begin fibroid tumor    CERVIX SURGERY      colpo 2002      radial plate       skin cancers      TONSILLECTOMY      childhood    TONSILLECTOMY, ADENOIDECTOMY      childhood     Social History     Socioeconomic History    Marital status:    Occupational History    Occupation:    Tobacco Use    Smoking status: Former    Smokeless tobacco: Never    Tobacco comments:     quit >34 years ago   Substance and Sexual Activity    Alcohol use: Yes     Alcohol/week: 10.0 standard drinks     Types: 10 Glasses of wine per week     Comment: has at least one drink daily/10-12 per week    Drug use: No    Sexual activity: Yes     Partners: Male     Birth control/protection: Post-menopausal   Social History Narrative    Retired from safety professional    Now working on novel writing and blogger     Social Determinants of Health     Food Insecurity: No Food Insecurity    Worried About Running Out of Food in the Last Year: Never true    Ran Out of Food in the Last Year: Never true   Transportation Needs: No Transportation Needs    Lack of Transportation (Medical): No    Lack of Transportation (Non-Medical): No   Physical Activity: Unknown    Minutes of Exercise per Session: 40 min   Stress: No Stress Concern Present    Feeling of Stress : Only a little   Social Connections:  "Unknown    Frequency of Communication with Friends and Family: Once a week    Frequency of Social Gatherings with Friends and Family: Never    Attends Club or Organization Meetings: 1 to 4 times per year    Marital Status:    Housing Stability: Unknown    Number of Places Lived in the Last Year: 1    Unstable Housing in the Last Year: No     Family History   Problem Relation Age of Onset    Stroke Mother     Cataracts Mother     Dementia Mother     Stroke Father     Dementia Father     Diabetes Maternal Aunt     Cancer Maternal Aunt         thyroid cancer    Melanoma Maternal Aunt     Cataracts Maternal Aunt     Heart disease Maternal Uncle     Cataracts Maternal Uncle     Diabetes Maternal Uncle     Heart disease Paternal Uncle     Cancer Paternal Uncle         prostate cancer    Diabetes Paternal Uncle     Heart disease Maternal Grandmother     Diabetes Maternal Grandmother     Heart disease Maternal Grandfather     Heart disease Paternal Grandmother     Heart disease Paternal Grandfather     Cancer Paternal Grandfather         bone cancer    Cancer Paternal Aunt         stomach cancer    Diabetes Paternal Aunt     Glaucoma Sister     Strabismus Sister     Amblyopia Sister     Breast cancer Neg Hx     Colon cancer Neg Hx     Ovarian cancer Neg Hx     Macular degeneration Neg Hx     Retinal detachment Neg Hx      OB History          1    Para   1    Term   0            AB        Living   1         SAB        IAB        Ectopic        Multiple        Live Births   1                 /64   Ht 5' 4" (1.626 m)   Wt 75.3 kg (166 lb 0.1 oz)   LMP 2012   BMI 28.49 kg/m²       ROS:    ROS:  GENERAL: Denies weight gain or weight loss. Feeling well overall.   SKIN: Denies rash or lesions.   HEAD: Denies head injury or headache.   NODES: Denies enlarged lymph nodes.   CHEST: Denies chest pain or shortness of breath.   CARDIOVASCULAR: Denies palpitations or left sided chest pain.   ABDOMEN: " No abdominal pain, constipation, diarrhea, nausea, vomiting or rectal bleeding.   URINARY: No frequency, dysuria, hematuria, or burning on urination.  REPRODUCTIVE: See HPI.   BREASTS: The patient performs breast self-examination and denies pain, lumps, or nipple discharge.   HEMATOLOGIC: No easy bruisability or excessive bleeding.   MUSCULOSKELETAL: Denies joint pain or swelling.   NEUROLOGIC: Denies syncope or weakness.   PSYCHIATRIC: Denies depression, anxiety or mood swings.    PHYSICAL EXAM:    APPEARANCE: Well nourished, well developed, in no acute distress.  AFFECT: WNL, alert and oriented x 3  SKIN: No acne or hirsutism  NECK: Neck symmetric without masses or thyromegaly  NODES: No inguinal, cervical, axillary, or femoral lymph node enlargement  CHEST: Good respiratory effect  ABDOMEN: Soft.  No tenderness or masses.  No hepatosplenomegaly.  No hernias.  BREASTS: Symmetrical, no skin changes or visible lesions.  No palpable masses, nipple discharge bilaterally.  PELVIC: Normal external genitalia without lesions.  Normal hair distribution.  Adequate perineal body, normal urethral meatus.  Vagina moist and well rugated without lesions or discharge.  Cervix pink, without lesions, discharge or tenderness.  No significant cystocele or rectocele.  Bimanual exam shows uterus to be normal size, regular, mobile and nontender.  Adnexa without masses or tenderness.    RECTAL: Rectovaginal exam confirms above with normal sphincter tone, no masses.  EXTREMITIES: No edema.      ICD-10-CM ICD-9-CM    1. Encounter for gynecological examination without abnormal finding  Z01.419 V72.31 Liquid-Based Pap Smear, Screening      2. Postmenopausal  Z78.0 V49.81 DXA Bone Density Axial Skeleton 1 or more sites          MMG scheduled  Colonoscopy scheduled    Patient was counseled today on A.C.S. Pap guidelines and recommendations for yearly pelvic exams, mammograms and monthly self breast exams; to see her PCP for other health  maintenance.     Follow up in about 1 year (around 3/9/2024), or if symptoms worsen or fail to improve.

## 2023-03-09 NOTE — PROGRESS NOTES
"[facility]   Physical Therapy Treatment Note    Name: Marilee DONOVAN [facility]  Clinic Number: [id number]    Therapy Diagnosis:        Encounter Diagnoses   Name Primary?    Chronic right hip pain      Low back pain, unspecified back pain laterality, unspecified chronicity, unspecified whether sciatica present      Decreased strength of trunk and back      Impaired strength of hip muscles      Facet arthropathy, lumbar        Physician: Ryan Dexter MD     Physician Orders: PT Eval and Treat   Medical Diagnosis from Referral:   M25.551,G89.29 (ICD-10-CM) - Chronic right hip pain   M54.50 (ICD-10-CM) - Low back pain, unspecified back pain laterality, unspecified chronicity, unspecified whether sciatica present      Evaluation Date: 1/18/2023  Authorization Period Expiration: 12/31/23  Plan of Care Expiration: 3/29/23  Progress Note Due: 3/29/23  Visit # / Visits authorized: 4/ 12   FOTO: 2/3     Precautions: Standard   PTA Visit #: 2/5     Time In:10:00 AM  Time Out: 10:41 AM  Total Billable Time: 41 minutes (TE-2, TA-1, NMR-1)    SUBJECTIVE     Pt reports: she mowed the lawn on Wednesday and she did not experience a flare up.   She was compliant with home exercise program.  Response to previous treatment: tired  Functional change: "I don't wake up sore in the morning" , waking up with no pain    Pain: 2/10  Location: bilateral Low back (more right sided)    OBJECTIVE         Treatment     Marilee received the treatments listed below:      therapeutic exercises to develop strength, ROM, and core stabilization for 25 minutes including:    Bold=exercises performed today     LTR 3'  SKTC 10x5" hold  Piriformis stretch 3x30" deepak  SL clams GTB 2x10  Palloff press GTB 2x10 deepak   Upright bike 6' lvl 2  Bridges 2x10 5" hold +GTB  hip matrix abduction 40# 2x10 each leg  hip matrix extension +40# 2x10 each leg  +glute kick backs on shuttle 1 red band 2x10 each      neuromuscular re-education " "activities to improve: Coordination, Kinesthetic, and Proprioception for 08 minutes. The following activities were included:  PPT 20x5" hold - not performed   TrA with physio ball 2x10 5" hold - not performed   +90/90 heel taps 2x10  +dead bugs 2x10    therapeutic activities to improve functional performance for 8 minutes, including:  Deadlift 10# 2x10  TRX squats 2x10  Squats 10# 2x10 - not performed today    Patient Education and Home Exercises     Home Exercises Provided and Patient Education Provided     Education provided:   - HEP  - patient educated on all interventions performed today     Written Home Exercises Provided: yes. Exercises were reviewed and Marilee was able to demonstrate them prior to the end of the session.  Marilee demonstrated good  understanding of the education provided. See EMR under Patient Instructions for exercises provided during therapy sessions    ASSESSMENT       Session focused on hip and core strengthening interventions. Patient with good response to all interventions today with appropriate challenge. She can perform 90/90 heel taps and dead bugs with good core control. HEP is updated and reviewed with patient today. Patient will continue to benefit from progressive hip and core strengthening to address deficits, improve symptom management, and improve pain-free function.      Marilee Is progressing well towards her goals.   Pt prognosis is Good.     Pt will continue to benefit from skilled outpatient physical therapy to address the deficits listed in the problem list box on initial evaluation, provide pt/family education and to maximize pt's level of independence in the home and community environment.     Pt's spiritual, cultural and educational needs considered and pt agreeable to plan of care and goals.     Anticipated barriers to physical therapy: scheduling     Goals: Short Term Goals (5 Weeks):  1. Pt will be compliant with HEP to supplement PT in decreasing pain with " functional mobility.-MET  2. Pt will perform pallof press with good control to demonstrate improved core strength.-MET  3. Pt to endorse >/=50% improvement in symptoms and pain since start of care in order to improve upon deficits.   4. Pt will improve impaired LE MMTs by 1/2 muscle grade to improve strength for functional tasks.-MET     Long Term Goals (10 Weeks):   1. Pt to endorse >/=75% improvement in symptoms and pain since start of care in order to improve upon deficits.   2. Pt will perform bridge with good control to demonstrate improved core strength.-MET  3. Pt will improve impaired LE MMTs by 1 muscle grade to improve strength for functional tasks.  4. Pt will report no pain during lumbar ROM to promote functional mobility.-progressing  5. Pt will demo neg piriformis test in order to improve symptoms-MET    PLAN     Continue PT GASPER Hutchinson, PT

## 2023-03-10 ENCOUNTER — CLINICAL SUPPORT (OUTPATIENT)
Dept: REHABILITATION | Facility: HOSPITAL | Age: 63
End: 2023-03-10
Payer: COMMERCIAL

## 2023-03-10 DIAGNOSIS — R29.898 IMPAIRED STRENGTH OF HIP MUSCLES: ICD-10-CM

## 2023-03-10 DIAGNOSIS — M47.816 FACET ARTHROPATHY, LUMBAR: ICD-10-CM

## 2023-03-10 DIAGNOSIS — R29.898 DECREASED STRENGTH OF TRUNK AND BACK: Primary | ICD-10-CM

## 2023-03-10 PROCEDURE — 97530 THERAPEUTIC ACTIVITIES: CPT | Mod: PO

## 2023-03-10 PROCEDURE — 97110 THERAPEUTIC EXERCISES: CPT | Mod: PO

## 2023-03-10 PROCEDURE — 97112 NEUROMUSCULAR REEDUCATION: CPT | Mod: PO

## 2023-03-16 LAB
FINAL PATHOLOGIC DIAGNOSIS: NORMAL
Lab: NORMAL

## 2023-03-16 NOTE — PROGRESS NOTES
"[facility]   Physical Therapy Treatment Note    Name: Marilee DONOVAN [facility]  Clinic Number: [id number]    Therapy Diagnosis:        Encounter Diagnoses   Name Primary?    Chronic right hip pain      Low back pain, unspecified back pain laterality, unspecified chronicity, unspecified whether sciatica present      Decreased strength of trunk and back      Impaired strength of hip muscles      Facet arthropathy, lumbar        Physician: Ryan Dexter MD     Physician Orders: PT Eval and Treat   Medical Diagnosis from Referral:   M25.551,G89.29 (ICD-10-CM) - Chronic right hip pain   M54.50 (ICD-10-CM) - Low back pain, unspecified back pain laterality, unspecified chronicity, unspecified whether sciatica present      Evaluation Date: 1/18/2023  Authorization Period Expiration: 12/31/23  Plan of Care Expiration: 3/29/23  Progress Note Due: 3/29/23  Visit # / Visits authorized: 5/ 12   FOTO: 2/3     Precautions: Standard   PTA Visit #: 2/5     Time In:10:00 AM  Time Out: 10:41 AM  Total Billable Time: 41 minutes (TE-2, TA-1, NMR-1)    SUBJECTIVE     Pt reports: she mowed the lawn on yesterday without pain, some soreness. She continues to feel that the back is improving and the legs are stronger.  She was compliant with home exercise program.  Response to previous treatment: tired  Functional change: decrease in pain, feels more stable/stronger    Pain: 0/10  Location: bilateral Low back   OBJECTIVE         Treatment     Marilee received the treatments listed below:      Bold=interventions performed today     therapeutic exercises to develop strength, ROM, and core stabilization for 23 minutes including:  LTR 3'  SKTC 10x5" hold  Piriformis stretch 3x30" deepak  SL clams GTB 2x10  Palloff press GTB 2x10 deepak   Upright bike 6' lvl +4  Bridges 2x10 5" hold ,GTB  hip matrix abduction +55# 2x10 each leg  hip matrix extension +55# 2x10 each leg  glute kick backs on shuttle 1 red band 2x10 " "each      neuromuscular re-education activities to improve: Coordination, Kinesthetic, and Proprioception for 10 minutes. The following activities were included:    PPT 20x5" hold - not performed   TrA with physio ball 2x10 5" hold  90/90 heel taps 2x10  dead bugs 3x5  +bird dogs 3x5    therapeutic activities to improve functional performance for 8 minutes, including:  Deadlift 10# 2x10  TRX squats 2x10  Squats 10# 2x10  (to black chair)     Patient Education and Home Exercises     Home Exercises Provided and Patient Education Provided     Education provided:   - HEP  - patient educated on all interventions performed today     Written Home Exercises Provided: Patient instructed to cont prior HEP. Exercises were reviewed and Marilee was able to demonstrate them prior to the end of the session.  Marilee demonstrated good  understanding of the education provided. See EMR under Patient Instructions for exercises provided during therapy sessions    ASSESSMENT     Continued with hip and core strengthening today. Pt tolerates all interventions well without pain. She performs bird dogs today well with min cuing to maintain level pelvis. Overall, patient is progressing very well with current PT POC at this time. Progress as tolerated.    Marilee Is progressing well towards her goals.   Pt prognosis is Good.     Pt will continue to benefit from skilled outpatient physical therapy to address the deficits listed in the problem list box on initial evaluation, provide pt/family education and to maximize pt's level of independence in the home and community environment.     Pt's spiritual, cultural and educational needs considered and pt agreeable to plan of care and goals.     Anticipated barriers to physical therapy: scheduling     Goals: Short Term Goals (5 Weeks):  1. Pt will be compliant with HEP to supplement PT in decreasing pain with functional mobility.-MET  2. Pt will perform pallof press with good control to " demonstrate improved core strength.-MET  3. Pt to endorse >/=50% improvement in symptoms and pain since start of care in order to improve upon deficits.   4. Pt will improve impaired LE MMTs by 1/2 muscle grade to improve strength for functional tasks.-MET     Long Term Goals (10 Weeks):   1. Pt to endorse >/=75% improvement in symptoms and pain since start of care in order to improve upon deficits.   2. Pt will perform bridge with good control to demonstrate improved core strength.-MET  3. Pt will improve impaired LE MMTs by 1 muscle grade to improve strength for functional tasks.  4. Pt will report no pain during lumbar ROM to promote functional mobility.-progressing  5. Pt will demo neg piriformis test in order to improve symptoms-MET    PLAN     Continue PT POC    Fatemeh Hutchinson, PT

## 2023-03-17 ENCOUNTER — CLINICAL SUPPORT (OUTPATIENT)
Dept: REHABILITATION | Facility: HOSPITAL | Age: 63
End: 2023-03-17
Payer: COMMERCIAL

## 2023-03-17 DIAGNOSIS — M47.816 FACET ARTHROPATHY, LUMBAR: ICD-10-CM

## 2023-03-17 DIAGNOSIS — R29.898 IMPAIRED STRENGTH OF HIP MUSCLES: ICD-10-CM

## 2023-03-17 DIAGNOSIS — R29.898 DECREASED STRENGTH OF TRUNK AND BACK: Primary | ICD-10-CM

## 2023-03-17 PROCEDURE — 97112 NEUROMUSCULAR REEDUCATION: CPT | Mod: PO

## 2023-03-17 PROCEDURE — 97110 THERAPEUTIC EXERCISES: CPT | Mod: PO

## 2023-03-17 PROCEDURE — 97530 THERAPEUTIC ACTIVITIES: CPT | Mod: PO

## 2023-03-24 ENCOUNTER — CLINICAL SUPPORT (OUTPATIENT)
Dept: REHABILITATION | Facility: HOSPITAL | Age: 63
End: 2023-03-24
Payer: COMMERCIAL

## 2023-03-24 DIAGNOSIS — M47.816 FACET ARTHROPATHY, LUMBAR: ICD-10-CM

## 2023-03-24 DIAGNOSIS — R29.898 DECREASED STRENGTH OF TRUNK AND BACK: Primary | ICD-10-CM

## 2023-03-24 DIAGNOSIS — R29.898 IMPAIRED STRENGTH OF HIP MUSCLES: ICD-10-CM

## 2023-03-24 PROCEDURE — 97112 NEUROMUSCULAR REEDUCATION: CPT | Mod: PO

## 2023-03-24 PROCEDURE — 97110 THERAPEUTIC EXERCISES: CPT | Mod: PO

## 2023-03-24 PROCEDURE — 97530 THERAPEUTIC ACTIVITIES: CPT | Mod: PO

## 2023-03-24 NOTE — PROGRESS NOTES
"[facility]   Physical Therapy Treatment Note    Name: Marilee DONOVAN [facility]  Clinic Number: [id number]    Therapy Diagnosis:        Encounter Diagnoses   Name Primary?    Chronic right hip pain      Low back pain, unspecified back pain laterality, unspecified chronicity, unspecified whether sciatica present      Decreased strength of trunk and back      Impaired strength of hip muscles      Facet arthropathy, lumbar        Physician: Ryan Dexter MD     Physician Orders: PT Eval and Treat   Medical Diagnosis from Referral:   M25.551,G89.29 (ICD-10-CM) - Chronic right hip pain   M54.50 (ICD-10-CM) - Low back pain, unspecified back pain laterality, unspecified chronicity, unspecified whether sciatica present      Evaluation Date: 1/18/2023  Authorization Period Expiration: 12/31/23  Plan of Care Expiration: 3/29/23  Progress Note Due: 3/29/23  Visit # / Visits authorized: 5/ 12   FOTO: 2/3     Precautions: Standard   PTA Visit #: 2/5     Time In:1:00 PM  Time Out: 1:41 PM  Total Billable Time: 41 minutes (TE-2, TA-1, NMR-1)    SUBJECTIVE     Pt reports: continues to progress well with PT   She was compliant with home exercise program.  Response to previous treatment: tired  Functional change: decrease in pain, feels more stable/stronger    Pain: 1/10  Location: bilateral Low back   OBJECTIVE         Treatment     Marilee received the treatments listed below:      Bold=interventions performed today     therapeutic exercises to develop strength, ROM, and core stabilization for 23 minutes including:  LTR 3'  SKTC 10x5" hold  Piriformis stretch 3x30" deepak  SL clams GTB 2x10  Palloff press GTB 2x10 deepak   Upright bike 6' lvl +4  Bridges 2x10 5" hold ,GTB  hip matrix abduction +55# 2x10 each leg  hip matrix extension +55# 2x10 each leg  glute kick backs on shuttle 1 red band 2x10 each      neuromuscular re-education activities to improve: Coordination, Kinesthetic, and Proprioception for 10 " "minutes. The following activities were included:    PPT 20x5" hold - not performed   TrA with physio ball 2x10 5" hold  90/90 heel taps 2x10  dead bugs 3x5  +bird dogs 3x5    therapeutic activities to improve functional performance for 8 minutes, including:  Deadlift 10# 2x10  TRX squats 2x10  Squats 10# 2x10  (to black chair)     Patient Education and Home Exercises     Home Exercises Provided and Patient Education Provided     Education provided:   - HEP  - patient educated on all interventions performed today     Written Home Exercises Provided: Patient instructed to cont prior HEP. Exercises were reviewed and Marilee was able to demonstrate them prior to the end of the session.  Mairlee demonstrated good  understanding of the education provided. See EMR under Patient Instructions for exercises provided during therapy sessions    ASSESSMENT     Continued with hip and core strengthening today. Pt tolerates all interventions well without pain. She performs bird dogs today well with min cuing to maintain level pelvis. Overall, patient is progressing very well with current PT POC at this time. Progress as tolerated.    Marilee Is progressing well towards her goals.   Pt prognosis is Good.     Pt will continue to benefit from skilled outpatient physical therapy to address the deficits listed in the problem list box on initial evaluation, provide pt/family education and to maximize pt's level of independence in the home and community environment.     Pt's spiritual, cultural and educational needs considered and pt agreeable to plan of care and goals.     Anticipated barriers to physical therapy: scheduling     Goals: Short Term Goals (5 Weeks):  1. Pt will be compliant with HEP to supplement PT in decreasing pain with functional mobility.-MET  2. Pt will perform pallof press with good control to demonstrate improved core strength.-MET  3. Pt to endorse >/=50% improvement in symptoms and pain since start of care " in order to improve upon deficits.   4. Pt will improve impaired LE MMTs by 1/2 muscle grade to improve strength for functional tasks.-MET     Long Term Goals (10 Weeks):   1. Pt to endorse >/=75% improvement in symptoms and pain since start of care in order to improve upon deficits.   2. Pt will perform bridge with good control to demonstrate improved core strength.-MET  3. Pt will improve impaired LE MMTs by 1 muscle grade to improve strength for functional tasks.  4. Pt will report no pain during lumbar ROM to promote functional mobility.-progressing  5. Pt will demo neg piriformis test in order to improve symptoms-MET    PLAN     Continue PT POC    Rene Dobson, PT

## 2023-03-30 NOTE — PROGRESS NOTES
[facility]   Physical Therapy Treatment Note/ Discharge Note     Name: Marilee Mckee  Clinic Number: [id number]    Therapy Diagnosis:        Encounter Diagnoses   Name Primary?    Chronic right hip pain      Low back pain, unspecified back pain laterality, unspecified chronicity, unspecified whether sciatica present      Decreased strength of trunk and back      Impaired strength of hip muscles      Facet arthropathy, lumbar        Physician: Ryan Dexter MD     Physician Orders: PT Eval and Treat   Medical Diagnosis from Referral:   M25.551,G89.29 (ICD-10-CM) - Chronic right hip pain   M54.50 (ICD-10-CM) - Low back pain, unspecified back pain laterality, unspecified chronicity, unspecified whether sciatica present      Evaluation Date: 1/18/2023  Authorization Period Expiration: 12/31/23  Plan of Care Expiration: 3/29/23  Progress Note Due: 3/29/23  Visit # / Visits authorized: 7/ 12   FOTO: 2/3     Precautions: Standard   PTA Visit #: 2/5     Time In:10:01 AM  Time Out: 10:42 AM  Total Billable Time: 41 minutes (TE-2, TA-1, NMR-1)    SUBJECTIVE     Pt reports: continues to progress well with PT. Back feels tight today but not painful.   She was compliant with home exercise program.  Response to previous treatment: good but tired   Functional change: improved strength     Pain: 1/10  Location: bilateral Low back   OBJECTIVE     Lumbar ROM: %AROM    % Pain   Flexion WFL       Extension  WFL    Left Side Bending WFL     Right Side Bending WFL     Left rotation 75%    Right Rotation 75%               Lower Extremity Strength (graded 0-5 out of 5)  Bold=updated MMTs from today's reassessment     RLE LLE   Hip flexion: 4+/5 4+/5 5/5 5/5   Hip ER 4+/5 5/5 4+/5 5/5   Hip IR 4+/5 5/5 5/5 5/5   Knee extension: 5/5 5/5 5/5 5/5   Ankle dorsiflexion: 5/5 5/5   Posterior fibers of Gluteus medius 3+/5 >/=4/5 4+/5 4+/5   Knee flexion 5/5  5/5   Hip extension: 3+/5 (pain) 4+/5 >/=4-/5 4+/5     "       Limitation/Restriction for FOTO TBD Survey     Therapist reviewed FOTO scores for Marilee Mckee on 3/31/2023.   FOTO documents entered into [employer] - see Media section.     Limitation Score: 28%                Treatment     Marilee received the treatments listed below:      Bold=interventions performed today     therapeutic exercises to develop strength, ROM, and core stabilization for 23 minutes including:    Time for FOTO and reassess  LTR 3'  SKTC 10x5" hold  Piriformis stretch 3x30" deepak  SL clams GTB 2x10  Palloff press GTB 2x10 deepak   Upright bike 6' lvl +4  Bridges 2x10 5" hold   hip matrix abduction +55# 2x10 each leg  hip matrix extension +55# 2x10 each leg  glute kick backs on shuttle 1 red band 2x10 each      neuromuscular re-education activities to improve: Coordination, Kinesthetic, and Proprioception for 10 minutes. The following activities were included:    PPT 20x5" hold - not performed   TrA with physio ball 2x10 5" hold  90/90 heel taps 2x10  dead bugs 2x10  bird dogs 3x5    therapeutic activities to improve functional performance for 8 minutes, including:  Deadlift 10# 2x10  TRX squats 2x10  Squats 10# 2x10  (to black chair)     Patient Education and Home Exercises     Home Exercises Provided and Patient Education Provided     Education provided:   - discharge plan and review of exercises    Written Home Exercises Provided: Patient instructed to cont prior HEP. Exercises were reviewed and Marilee was able to demonstrate them prior to the end of the session.  Marilee demonstrated good  understanding of the education provided. See EMR under Patient Instructions for exercises provided during therapy sessions    ASSESSMENT     Pt has met almost all goals at this time. Is appropriate for discharge to independent HEP to maintain current level of function.        Goals: Short Term Goals (5 Weeks):  1. Pt will be compliant with HEP to supplement PT in decreasing pain with functional " mobility.-MET  2. Pt will perform pallof press with good control to demonstrate improved core strength.-MET  3. Pt to endorse >/=50% improvement in symptoms and pain since start of care in order to improve upon deficits. -MET  4. Pt will improve impaired LE MMTs by 1/2 muscle grade to improve strength for functional tasks.-MET     Long Term Goals (10 Weeks):   1. Pt to endorse >/=75% improvement in symptoms and pain since start of care in order to improve upon deficits. -MET  2. Pt will perform bridge with good control to demonstrate improved core strength.-MET  3. Pt will improve impaired LE MMTs by 1 muscle grade to improve strength for functional tasks.  4. Pt will report no pain during lumbar ROM to promote functional mobility.MET  5. Pt will demo neg piriformis test in order to improve symptoms-MET    PLAN   Patient is discharged today.    Fatemeh Hutchinson, PT

## 2023-03-31 ENCOUNTER — CLINICAL SUPPORT (OUTPATIENT)
Dept: REHABILITATION | Facility: HOSPITAL | Age: 63
End: 2023-03-31
Payer: COMMERCIAL

## 2023-03-31 DIAGNOSIS — M47.816 FACET ARTHROPATHY, LUMBAR: ICD-10-CM

## 2023-03-31 DIAGNOSIS — R29.898 DECREASED STRENGTH OF TRUNK AND BACK: Primary | ICD-10-CM

## 2023-03-31 DIAGNOSIS — R29.898 IMPAIRED STRENGTH OF HIP MUSCLES: ICD-10-CM

## 2023-03-31 PROCEDURE — 97112 NEUROMUSCULAR REEDUCATION: CPT | Mod: PO

## 2023-03-31 PROCEDURE — 97110 THERAPEUTIC EXERCISES: CPT | Mod: PO

## 2023-03-31 PROCEDURE — 97530 THERAPEUTIC ACTIVITIES: CPT | Mod: PO

## 2023-04-06 ENCOUNTER — HOSPITAL ENCOUNTER (OUTPATIENT)
Dept: RADIOLOGY | Facility: HOSPITAL | Age: 63
Discharge: HOME | End: 2023-04-06
Attending: OBSTETRICS & GYNECOLOGY
Payer: COMMERCIAL

## 2023-04-06 VITALS — HEIGHT: 64 IN | WEIGHT: 166 LBS | BODY MASS INDEX: 28.34 KG/M2

## 2023-04-06 DIAGNOSIS — Z12.31 SCREENING MAMMOGRAM, ENCOUNTER FOR: ICD-10-CM

## 2023-04-06 PROCEDURE — 77063 MAMMO DIGITAL SCREENING BILAT WITH TOMO: ICD-10-PCS | Mod: 26,,, | Performed by: RADIOLOGY

## 2023-04-06 PROCEDURE — 77067 SCR MAMMO BI INCL CAD: CPT | Mod: TC,PO

## 2023-04-06 PROCEDURE — 77067 SCR MAMMO BI INCL CAD: CPT | Mod: 26,,, | Performed by: RADIOLOGY

## 2023-04-06 PROCEDURE — 77063 BREAST TOMOSYNTHESIS BI: CPT | Mod: 26,,, | Performed by: RADIOLOGY

## 2023-04-06 PROCEDURE — 77067 MAMMO DIGITAL SCREENING BILAT WITH TOMO: ICD-10-PCS | Mod: 26,,, | Performed by: RADIOLOGY

## 2023-05-25 ENCOUNTER — APPOINTMENT (OUTPATIENT)
Dept: RADIOLOGY | Facility: CLINIC | Age: 63
End: 2023-05-25
Attending: OBSTETRICS & GYNECOLOGY
Payer: COMMERCIAL

## 2023-05-25 DIAGNOSIS — Z78.0 POSTMENOPAUSAL: ICD-10-CM

## 2023-05-25 PROCEDURE — 77080 DXA BONE DENSITY AXIAL: CPT | Mod: TC,PO

## 2023-05-25 PROCEDURE — 77080 DXA BONE DENSITY AXIAL SKELETON 1 OR MORE SITES: ICD-10-PCS | Mod: 26,,, | Performed by: INTERNAL MEDICINE

## 2023-05-25 PROCEDURE — 77080 DXA BONE DENSITY AXIAL: CPT | Mod: 26,,, | Performed by: INTERNAL MEDICINE

## 2023-06-08 NOTE — PROGRESS NOTES
Subjective:       Patient ID: Marilee Mckee is a 63 y.o. female.    Chief Complaint: Annual Exam    Patient is a 63 y.o.female who presents today for annual  Cholesterol: due now  Mammogram: 2023  Eye: up to date  Gyn exam: jeni  Colonoscopy: neg alexander in 2021  Exercise: walks 4-6 miles per day  Diet: healthy; lean meats  Derm : up to date  Review of Systems   Constitutional:  Positive for activity change. Negative for appetite change, chills, diaphoresis, fever and unexpected weight change.   HENT:  Positive for rhinorrhea. Negative for congestion, ear discharge, ear pain, hearing loss, postnasal drip, tinnitus, trouble swallowing and voice change.    Eyes:  Positive for discharge. Negative for redness, itching and visual disturbance.   Respiratory:  Negative for cough, chest tightness, shortness of breath and wheezing.    Cardiovascular:  Negative for chest pain, palpitations and leg swelling.   Gastrointestinal:  Positive for constipation. Negative for abdominal pain, blood in stool, diarrhea, nausea and vomiting.   Endocrine: Negative for cold intolerance, heat intolerance, polydipsia and polyuria.   Genitourinary:  Negative for difficulty urinating, dysuria, flank pain, hematuria, menstrual problem and urgency.   Musculoskeletal:  Negative for arthralgias, gait problem, joint swelling, myalgias, neck pain and neck stiffness.   Skin:  Negative for color change and rash.   Neurological:  Positive for headaches. Negative for dizziness, seizures, syncope and weakness.   Hematological:  Negative for adenopathy.   Psychiatric/Behavioral:  Positive for dysphoric mood. Negative for agitation, behavioral problems, confusion and sleep disturbance.      Objective:      Physical Exam  Vitals and nursing note reviewed.   Constitutional:       General: She is not in acute distress.     Appearance: She is well-developed. She is not diaphoretic.   HENT:      Head: Normocephalic and atraumatic.      Right Ear:  External ear normal.      Left Ear: External ear normal.      Nose: Nose normal.      Mouth/Throat:      Pharynx: No oropharyngeal exudate.   Eyes:      General: No scleral icterus.        Right eye: No discharge.         Left eye: No discharge.      Conjunctiva/sclera: Conjunctivae normal.      Pupils: Pupils are equal, round, and reactive to light.   Neck:      Thyroid: No thyromegaly.      Vascular: No JVD.      Trachea: No tracheal deviation.   Cardiovascular:      Rate and Rhythm: Normal rate.      Heart sounds: Normal heart sounds. No murmur heard.    No friction rub. No gallop.   Pulmonary:      Effort: Pulmonary effort is normal. No respiratory distress.      Breath sounds: Normal breath sounds. No stridor. No wheezing or rales.   Chest:      Chest wall: No tenderness.   Abdominal:      General: Bowel sounds are normal. There is no distension.      Palpations: Abdomen is soft.      Tenderness: There is no abdominal tenderness. There is no rebound.   Musculoskeletal:         General: No tenderness.      Cervical back: Neck supple.   Lymphadenopathy:      Cervical: No cervical adenopathy.   Skin:     General: Skin is warm and dry.      Findings: No erythema or rash.   Neurological:      Mental Status: She is alert and oriented to person, place, and time.   Psychiatric:         Behavior: Behavior normal.       Assessment and Plan:       1. Annual physical exam    - CBC Auto Differential; Future  - Comprehensive Metabolic Panel; Future  - Hemoglobin A1C; Future  - Lipid Panel; Future  - TSH; Future  - Urinalysis; Future  - Vitamin D; Future    2. Dyslipidemia  On statin          No follow-ups on file.

## 2023-06-19 ENCOUNTER — TELEPHONE (OUTPATIENT)
Dept: ENDOSCOPY | Facility: HOSPITAL | Age: 63
End: 2023-06-19

## 2023-06-19 ENCOUNTER — CLINICAL SUPPORT (OUTPATIENT)
Dept: ENDOSCOPY | Facility: HOSPITAL | Age: 63
End: 2023-06-19
Attending: INTERNAL MEDICINE
Payer: COMMERCIAL

## 2023-06-19 DIAGNOSIS — Z12.11 SCREENING FOR COLON CANCER: ICD-10-CM

## 2023-06-19 NOTE — TELEPHONE ENCOUNTER
Spoke to patient to schedule procedure(s) Colonoscopy       Physician to perform procedure(s) Dr. GINO Ruiz  Date of Procedure (s) 9/12/23  Arrival Time 6:30 AM  Time of Procedure(s) 7:30 AM   Location of Procedure(s) [facility] [address] [facility]  Type of Rx Prep sent to patient: Sutab  Instructions provided to patient via [facility]    Patient was informed on the following information and verbalized understanding. Screening questionnaire reviewed with patient and complete. If procedure requires anesthesia, a responsible adult needs to be present to accompany the patient home, patient cannot drive after receiving anesthesia. Appointment details are tentative, especially check-in time. Patient will receive a prep-op call 4 days prior to confirm check-in time for procedure. If applicable the patient should contact their pharmacy to verify Rx for procedure prep is ready for pick-up. Patient was advised to call the scheduling department at [phone] if pharmacy states no Rx is available. Patient was advised to call the endoscopy scheduling department if any questions or concerns arise.      [facility] Scheduling Department

## 2023-06-20 ENCOUNTER — OFFICE VISIT (OUTPATIENT)
Dept: INTERNAL MEDICINE | Facility: CLINIC | Age: 63
End: 2023-06-20
Payer: COMMERCIAL

## 2023-06-20 VITALS
RESPIRATION RATE: 14 BRPM | OXYGEN SATURATION: 95 % | DIASTOLIC BLOOD PRESSURE: 76 MMHG | TEMPERATURE: 97 F | BODY MASS INDEX: 28.95 KG/M2 | HEART RATE: 73 BPM | SYSTOLIC BLOOD PRESSURE: 116 MMHG | WEIGHT: 168.63 LBS

## 2023-06-20 DIAGNOSIS — Z00.00 ANNUAL PHYSICAL EXAM: Primary | ICD-10-CM

## 2023-06-20 DIAGNOSIS — E78.5 DYSLIPIDEMIA: ICD-10-CM

## 2023-06-20 PROCEDURE — 99999 PR PBB SHADOW E&M-EST. PATIENT-LVL IV: ICD-10-PCS | Mod: PBBFAC,,, | Performed by: INTERNAL MEDICINE

## 2023-06-20 PROCEDURE — 99396 PREV VISIT EST AGE 40-64: CPT | Mod: S$GLB,,, | Performed by: INTERNAL MEDICINE

## 2023-06-20 PROCEDURE — 99396 PR PREVENTIVE VISIT,EST,40-64: ICD-10-PCS | Mod: S$GLB,,, | Performed by: INTERNAL MEDICINE

## 2023-06-20 PROCEDURE — 99999 PR PBB SHADOW E&M-EST. PATIENT-LVL IV: CPT | Mod: PBBFAC,,, | Performed by: INTERNAL MEDICINE

## 2023-06-21 ENCOUNTER — PATIENT MESSAGE (OUTPATIENT)
Dept: INTERNAL MEDICINE | Facility: CLINIC | Age: 63
End: 2023-06-21
Payer: COMMERCIAL

## 2023-06-22 ENCOUNTER — LAB VISIT (OUTPATIENT)
Dept: LAB | Facility: HOSPITAL | Age: 63
End: 2023-06-22
Payer: COMMERCIAL

## 2023-06-22 DIAGNOSIS — Z00.00 ANNUAL PHYSICAL EXAM: ICD-10-CM

## 2023-06-22 LAB
25(OH)D3+25(OH)D2 SERPL-MCNC: 59 NG/ML (ref 30–96)
ALBUMIN SERPL BCP-MCNC: 4.1 G/DL (ref 3.5–5.2)
ALP SERPL-CCNC: 74 U/L (ref 55–135)
ALT SERPL W/O P-5'-P-CCNC: 24 U/L (ref 10–44)
ANION GAP SERPL CALC-SCNC: 9 MMOL/L (ref 8–16)
AST SERPL-CCNC: 26 U/L (ref 10–40)
BASOPHILS # BLD AUTO: 0.04 K/UL (ref 0–0.2)
BASOPHILS NFR BLD: 0.8 % (ref 0–1.9)
BILIRUB SERPL-MCNC: 0.7 MG/DL (ref 0.1–1)
BUN SERPL-MCNC: 12 MG/DL (ref 8–23)
CALCIUM SERPL-MCNC: 9.2 MG/DL (ref 8.7–10.5)
CHLORIDE SERPL-SCNC: 107 MMOL/L (ref 95–110)
CHOLEST SERPL-MCNC: 162 MG/DL (ref 120–199)
CHOLEST/HDLC SERPL: 2.3 {RATIO} (ref 2–5)
CO2 SERPL-SCNC: 25 MMOL/L (ref 23–29)
CREAT SERPL-MCNC: 0.7 MG/DL (ref 0.5–1.4)
DIFFERENTIAL METHOD: ABNORMAL
EOSINOPHIL # BLD AUTO: 0.1 K/UL (ref 0–0.5)
EOSINOPHIL NFR BLD: 2.8 % (ref 0–8)
ERYTHROCYTE [DISTWIDTH] IN BLOOD BY AUTOMATED COUNT: 13.2 % (ref 11.5–14.5)
EST. GFR  (NO RACE VARIABLE): >60 ML/MIN/1.73 M^2
ESTIMATED AVG GLUCOSE: 105 MG/DL (ref 68–131)
GLUCOSE SERPL-MCNC: 104 MG/DL (ref 70–110)
HBA1C MFR BLD: 5.3 % (ref 4–5.6)
HCT VFR BLD AUTO: 48.5 % (ref 37–48.5)
HDLC SERPL-MCNC: 71 MG/DL (ref 40–75)
HDLC SERPL: 43.8 % (ref 20–50)
HGB BLD-MCNC: 15.9 G/DL (ref 12–16)
IMM GRANULOCYTES # BLD AUTO: 0.01 K/UL (ref 0–0.04)
IMM GRANULOCYTES NFR BLD AUTO: 0.2 % (ref 0–0.5)
LDLC SERPL CALC-MCNC: 74.4 MG/DL (ref 63–159)
LYMPHOCYTES # BLD AUTO: 1.7 K/UL (ref 1–4.8)
LYMPHOCYTES NFR BLD: 34.3 % (ref 18–48)
MCH RBC QN AUTO: 32.1 PG (ref 27–31)
MCHC RBC AUTO-ENTMCNC: 32.8 G/DL (ref 32–36)
MCV RBC AUTO: 98 FL (ref 82–98)
MONOCYTES # BLD AUTO: 0.4 K/UL (ref 0.3–1)
MONOCYTES NFR BLD: 8.5 % (ref 4–15)
NEUTROPHILS # BLD AUTO: 2.6 K/UL (ref 1.8–7.7)
NEUTROPHILS NFR BLD: 53.4 % (ref 38–73)
NONHDLC SERPL-MCNC: 91 MG/DL
NRBC BLD-RTO: 0 /100 WBC
PLATELET # BLD AUTO: 315 K/UL (ref 150–450)
PMV BLD AUTO: 10.7 FL (ref 9.2–12.9)
POTASSIUM SERPL-SCNC: 4.1 MMOL/L (ref 3.5–5.1)
PROT SERPL-MCNC: 7 G/DL (ref 6–8.4)
RBC # BLD AUTO: 4.95 M/UL (ref 4–5.4)
SODIUM SERPL-SCNC: 141 MMOL/L (ref 136–145)
TRIGL SERPL-MCNC: 83 MG/DL (ref 30–150)
TSH SERPL DL<=0.005 MIU/L-ACNC: 2.06 UIU/ML (ref 0.4–4)
WBC # BLD AUTO: 4.95 K/UL (ref 3.9–12.7)

## 2023-06-22 PROCEDURE — 83036 HEMOGLOBIN GLYCOSYLATED A1C: CPT | Performed by: INTERNAL MEDICINE

## 2023-06-22 PROCEDURE — 84443 ASSAY THYROID STIM HORMONE: CPT | Performed by: INTERNAL MEDICINE

## 2023-06-22 PROCEDURE — 85025 COMPLETE CBC W/AUTO DIFF WBC: CPT | Performed by: INTERNAL MEDICINE

## 2023-06-22 PROCEDURE — 80061 LIPID PANEL: CPT | Performed by: INTERNAL MEDICINE

## 2023-06-22 PROCEDURE — 82306 VITAMIN D 25 HYDROXY: CPT | Performed by: INTERNAL MEDICINE

## 2023-06-22 PROCEDURE — 80053 COMPREHEN METABOLIC PANEL: CPT | Performed by: INTERNAL MEDICINE

## 2023-06-22 PROCEDURE — 36415 COLL VENOUS BLD VENIPUNCTURE: CPT | Mod: PO | Performed by: INTERNAL MEDICINE

## 2023-07-20 RX ORDER — DESLORATADINE 5 MG/1
TABLET ORAL
Qty: 90 TABLET | Refills: 0 | Status: SHIPPED | OUTPATIENT
Start: 2023-07-20 | End: 2023-10-20

## 2023-07-20 RX ORDER — MONTELUKAST SODIUM 10 MG/1
TABLET ORAL
Qty: 90 TABLET | Refills: 0 | Status: SHIPPED | OUTPATIENT
Start: 2023-07-20 | End: 2023-10-20

## 2023-09-07 ENCOUNTER — OFFICE VISIT (OUTPATIENT)
Dept: OPTOMETRY | Facility: CLINIC | Age: 63
End: 2023-09-07
Payer: COMMERCIAL

## 2023-09-07 DIAGNOSIS — H52.203 MYOPIA OF BOTH EYES WITH ASTIGMATISM AND PRESBYOPIA: ICD-10-CM

## 2023-09-07 DIAGNOSIS — H25.13 NUCLEAR SCLEROSIS, BILATERAL: Primary | ICD-10-CM

## 2023-09-07 DIAGNOSIS — Z46.0 FITTING AND ADJUSTMENT OF SPECTACLES AND CONTACT LENSES: Primary | ICD-10-CM

## 2023-09-07 DIAGNOSIS — H52.13 MYOPIA OF BOTH EYES WITH ASTIGMATISM AND PRESBYOPIA: ICD-10-CM

## 2023-09-07 DIAGNOSIS — Z13.5 GLAUCOMA SCREENING: ICD-10-CM

## 2023-09-07 DIAGNOSIS — H52.4 MYOPIA OF BOTH EYES WITH ASTIGMATISM AND PRESBYOPIA: ICD-10-CM

## 2023-09-07 PROCEDURE — 92014 PR EYE EXAM, EST PATIENT,COMPREHESV: ICD-10-PCS | Mod: S$GLB,,, | Performed by: OPTOMETRIST

## 2023-09-07 PROCEDURE — 99999 PR PBB SHADOW E&M-EST. PATIENT-LVL III: ICD-10-PCS | Mod: PBBFAC,,, | Performed by: OPTOMETRIST

## 2023-09-07 PROCEDURE — 92015 PR REFRACTION: ICD-10-PCS | Mod: S$GLB,,, | Performed by: OPTOMETRIST

## 2023-09-07 PROCEDURE — 92310 CONTACT LENS FITTING OU: CPT | Mod: CSM,,, | Performed by: OPTOMETRIST

## 2023-09-07 PROCEDURE — 92014 COMPRE OPH EXAM EST PT 1/>: CPT | Mod: S$GLB,,, | Performed by: OPTOMETRIST

## 2023-09-07 PROCEDURE — 92015 DETERMINE REFRACTIVE STATE: CPT | Mod: S$GLB,,, | Performed by: OPTOMETRIST

## 2023-09-07 PROCEDURE — 92310 PR CONTACT LENS FITTING (NO CHANGE): ICD-10-PCS | Mod: CSM,,, | Performed by: OPTOMETRIST

## 2023-09-07 PROCEDURE — 99999 PR PBB SHADOW E&M-EST. PATIENT-LVL III: CPT | Mod: PBBFAC,,, | Performed by: OPTOMETRIST

## 2023-09-07 NOTE — PROGRESS NOTES
HPI    62 Y/o female is here for routine eye exam with C/o pt state she having   with current Rx glasses pt states she moving glasses around order to see   clearly pt states vision has change with Rx glasses both near and far.   Contact lens Rx seems to be fine   Pt denies pain and discomfort  Pt see's floaters in OU     Eye med:  Last edited by Corona Contreras MA on 2023  2:24 PM.            Assessment /Plan     For exam results, see Encounter Report.    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  NO SLEEPING IN CONTACT LENSES. Clean and disinfect nightly.  exchange monthly.     Surgical consult--Dr Shook (pt wishes to wait until mid-December for appt--running a half marathon early December)

## 2023-09-08 ENCOUNTER — PATIENT MESSAGE (OUTPATIENT)
Dept: OPTOMETRY | Facility: CLINIC | Age: 63
End: 2023-09-08
Payer: COMMERCIAL

## 2023-09-12 ENCOUNTER — HOSPITAL ENCOUNTER (OUTPATIENT)
Facility: HOSPITAL | Age: 63
Discharge: HOME | End: 2023-09-12
Attending: INTERNAL MEDICINE | Admitting: INTERNAL MEDICINE
Payer: COMMERCIAL

## 2023-09-12 ENCOUNTER — ANESTHESIA (OUTPATIENT)
Dept: ENDOSCOPY | Facility: HOSPITAL | Age: 63
End: 2023-09-12
Payer: COMMERCIAL

## 2023-09-12 ENCOUNTER — ANESTHESIA EVENT (OUTPATIENT)
Dept: ENDOSCOPY | Facility: HOSPITAL | Age: 63
End: 2023-09-12
Payer: COMMERCIAL

## 2023-09-12 VITALS
TEMPERATURE: 98 F | WEIGHT: 155 LBS | DIASTOLIC BLOOD PRESSURE: 53 MMHG | BODY MASS INDEX: 26.46 KG/M2 | RESPIRATION RATE: 18 BRPM | HEART RATE: 70 BPM | OXYGEN SATURATION: 97 % | SYSTOLIC BLOOD PRESSURE: 107 MMHG | HEIGHT: 64 IN

## 2023-09-12 DIAGNOSIS — Z12.11 SCREENING FOR COLON CANCER: ICD-10-CM

## 2023-09-12 PROCEDURE — 25000003 PHARM REV CODE 250: Performed by: NURSE ANESTHETIST, CERTIFIED REGISTERED

## 2023-09-12 PROCEDURE — 63600175 PHARM REV CODE 636 W HCPCS: Performed by: NURSE ANESTHETIST, CERTIFIED REGISTERED

## 2023-09-12 PROCEDURE — 27201012 HC FORCEPS, HOT/COLD, DISP: Performed by: INTERNAL MEDICINE

## 2023-09-12 PROCEDURE — 45380 PR COLONOSCOPY,BIOPSY: ICD-10-PCS | Mod: 22,33,, | Performed by: INTERNAL MEDICINE

## 2023-09-12 PROCEDURE — 37000008 HC ANESTHESIA 1ST 15 MINUTES: Performed by: INTERNAL MEDICINE

## 2023-09-12 PROCEDURE — 88305 TISSUE EXAM BY PATHOLOGIST: CPT | Mod: 26,,, | Performed by: STUDENT IN AN ORGANIZED HEALTH CARE EDUCATION/TRAINING PROGRAM

## 2023-09-12 PROCEDURE — 88305 TISSUE EXAM BY PATHOLOGIST: CPT | Performed by: STUDENT IN AN ORGANIZED HEALTH CARE EDUCATION/TRAINING PROGRAM

## 2023-09-12 PROCEDURE — 37000009 HC ANESTHESIA EA ADD 15 MINS: Performed by: INTERNAL MEDICINE

## 2023-09-12 PROCEDURE — 45380 COLONOSCOPY AND BIOPSY: CPT | Mod: PT | Performed by: INTERNAL MEDICINE

## 2023-09-12 PROCEDURE — E9220 PRA ENDO ANESTHESIA: ICD-10-PCS | Mod: 33,,, | Performed by: NURSE ANESTHETIST, CERTIFIED REGISTERED

## 2023-09-12 PROCEDURE — 45380 COLONOSCOPY AND BIOPSY: CPT | Mod: 22,33,, | Performed by: INTERNAL MEDICINE

## 2023-09-12 PROCEDURE — E9220 PRA ENDO ANESTHESIA: HCPCS | Mod: 33,,, | Performed by: NURSE ANESTHETIST, CERTIFIED REGISTERED

## 2023-09-12 PROCEDURE — 88305 TISSUE EXAM BY PATHOLOGIST: ICD-10-PCS | Mod: 26,,, | Performed by: STUDENT IN AN ORGANIZED HEALTH CARE EDUCATION/TRAINING PROGRAM

## 2023-09-12 RX ORDER — SODIUM CHLORIDE 9 MG/ML
INJECTION, SOLUTION INTRAVENOUS CONTINUOUS
Status: DISCONTINUED | OUTPATIENT
Start: 2023-09-12 | End: 2023-09-12 | Stop reason: HOSPADM

## 2023-09-12 RX ORDER — LIDOCAINE HYDROCHLORIDE 20 MG/ML
INJECTION INTRAVENOUS
Status: DISCONTINUED | OUTPATIENT
Start: 2023-09-12 | End: 2023-09-12

## 2023-09-12 RX ORDER — PROPOFOL 10 MG/ML
VIAL (ML) INTRAVENOUS CONTINUOUS PRN
Status: DISCONTINUED | OUTPATIENT
Start: 2023-09-12 | End: 2023-09-12

## 2023-09-12 RX ORDER — PROPOFOL 10 MG/ML
VIAL (ML) INTRAVENOUS
Status: DISCONTINUED | OUTPATIENT
Start: 2023-09-12 | End: 2023-09-12

## 2023-09-12 RX ADMIN — PROPOFOL 175 MCG/KG/MIN: 10 INJECTION, EMULSION INTRAVENOUS at 07:09

## 2023-09-12 RX ADMIN — LIDOCAINE HYDROCHLORIDE 50 MG: 20 INJECTION INTRAVENOUS at 07:09

## 2023-09-12 RX ADMIN — PROPOFOL 30 MG: 10 INJECTION, EMULSION INTRAVENOUS at 07:09

## 2023-09-12 RX ADMIN — PROPOFOL 80 MG: 10 INJECTION, EMULSION INTRAVENOUS at 07:09

## 2023-09-12 RX ADMIN — SODIUM CHLORIDE: 0.9 INJECTION, SOLUTION INTRAVENOUS at 07:09

## 2023-09-12 RX ADMIN — PROPOFOL 50 MG: 10 INJECTION, EMULSION INTRAVENOUS at 07:09

## 2023-09-12 NOTE — ANESTHESIA PREPROCEDURE EVALUATION
09/12/2023  Marilee Mckee is a 63 y.o., female.  Past Medical History:   Diagnosis Date    Abnormal Pap smear     Asthma     Cataract     DJD (degenerative joint disease) of knee     Fibrocystic breast     GERD (gastroesophageal reflux disease)     Stroke 2015    some memory loss and weakness in right arm     Past Surgical History:   Procedure Laterality Date    BREAST BIOPSY Right 1990    benign    BREAST BIOPSY Left 2001    benign    BREAST SURGERY  1990 and 2001    right side and left side/begin fibroid tumor    CERVIX SURGERY      colpo 2002      radial plate       skin cancers      TONSILLECTOMY      childhood    TONSILLECTOMY, ADENOIDECTOMY      childhood         Pre-op Assessment    I have reviewed the Patient Summary Reports.     I have reviewed the Nursing Notes. I have reviewed the NPO Status.   I have reviewed the Medications.     Review of Systems  Anesthesia Hx:  No problems with previous Anesthesia    Social:  Former Smoker, Social Alcohol Use    Pulmonary:   Asthma    Hepatic/GI:   GERD    Musculoskeletal:   Arthritis     Neurological:   CVA        Physical Exam  General: Well nourished, Cooperative, Alert and Oriented    Airway:  Mallampati: II / II  Mouth Opening: Normal  TM Distance: Normal  Tongue: Normal  Neck ROM: Normal ROM    Dental:  Intact    Chest/Lungs:  Clear to auscultation, Normal Respiratory Rate    Heart:  Rate: Normal  Rhythm: Regular Rhythm        Anesthesia Plan  Type of Anesthesia, risks & benefits discussed:    Anesthesia Type: Gen Natural Airway  Intra-op Monitoring Plan: Standard ASA Monitors  Post Op Pain Control Plan: multimodal analgesia  Induction:  IV  Informed Consent: Informed consent signed with the Patient and all parties understand the risks and agree with anesthesia plan.  All questions answered.   ASA Score: 3  Day of Surgery Review of  History & Physical: H&P Update referred to the surgeon/provider.    Ready For Surgery From Anesthesia Perspective.     .

## 2023-09-12 NOTE — ANESTHESIA POSTPROCEDURE EVALUATION
Anesthesia Post Evaluation    Patient: Marilee Mckee    Procedure(s) Performed: Procedure(s) (LRB):  COLONOSCOPY (N/A)    Final Anesthesia Type: general      Patient location during evaluation: PACU  Patient participation: Yes- Able to Participate  Level of consciousness: awake and alert  Post-procedure vital signs: reviewed and stable  Pain management: adequate  Airway patency: patent    PONV status at discharge: No PONV  Anesthetic complications: no      Cardiovascular status: blood pressure returned to baseline  Respiratory status: spontaneous ventilation and room air  Hydration status: euvolemic  Follow-up not needed.          Vitals Value Taken Time   /53 09/12/23 0850   Temp 36.4 °C (97.5 °F) 09/12/23 0815   Pulse 70 09/12/23 0846   Resp 18 09/12/23 0836   SpO2 97 % 09/12/23 0846         Event Time   Out of Recovery 08:56:44         Pain/Karie Score: Karie Score: 10 (9/12/2023  8:21 AM)

## 2023-09-12 NOTE — TRANSFER OF CARE
"Anesthesia Transfer of Care Note    Patient: Marilee Mckee    Procedure(s) Performed: Procedure(s) (LRB):  COLONOSCOPY (N/A)    Patient location: PACU    Anesthesia Type: general    Transport from OR: Transported from OR on room air with adequate spontaneous ventilation    Post pain: adequate analgesia    Post assessment: no apparent anesthetic complications    Post vital signs: stable    Level of consciousness: awake    Nausea/Vomiting: no nausea/vomiting    Complications: none    Transfer of care protocol was followed      Last vitals:   Visit Vitals  /59 (BP Location: Left arm, Patient Position: Lying)   Pulse 86   Temp 36.7 °C (98.1 °F) (Temporal)   Resp 18   Ht 5' 4" (1.626 m)   Wt 70.3 kg (155 lb)   LMP 04/21/2012   SpO2 99%   Breastfeeding No   BMI 26.61 kg/m²     "

## 2023-09-12 NOTE — PROVATION PATIENT INSTRUCTIONS
Discharge Summary/Instructions after an Endoscopic Procedure  Patient Name: Marilee Clark  Patient MRN: [id number]  Patient YOB: 1960 Tuesday, September 12, 2023  Niles Ruiz MD  Dear patient,  As a result of recent federal legislation (The Federal Cures Act), you may   receive lab or pathology results from your procedure in your [facility]   account before your physician is able to contact you. Your physician or   their representative will relay the results to you with their   recommendations at their soonest availability.  Thank you,  RESTRICTIONS:  During your procedure today, you received medications for sedation.  These   medications may affect your judgment, balance and coordination.  Therefore,   for 24 hours, you have the following restrictions:   - DO NOT drive a car, operate machinery, make legal/financial decisions,   sign important papers or drink alcohol.    ACTIVITY:  Today: no heavy lifting, straining or running due to procedural   sedation/anesthesia.  The following day: return to full activity including work.  DIET:  Eat and drink normally unless instructed otherwise.     TREATMENT FOR COMMON SIDE EFFECTS:  - Mild abdominal pain, nausea, belching, bloating or excessive gas:  rest,   eat lightly and use a heating pad.  - Sore Throat: treat with throat lozenges and/or gargle with warm salt   water.  - Because air was used during the procedure, expelling large amounts of air   from your rectum or belching is normal.  - If a bowel prep was taken, you may not have a bowel movement for 1-3 days.    This is normal.  SYMPTOMS TO WATCH FOR AND REPORT TO YOUR PHYSICIAN:  1. Abdominal pain or bloating, other than gas cramps.  2. Chest pain.  3. Back pain.  4. Signs of infection such as: chills or fever occurring within 24 hours   after the procedure.  5. Rectal bleeding, which would show as bright red, maroon, or black stools.   (A tablespoon of blood from the rectum is not serious,  especially if   hemorrhoids are present.)  6. Vomiting.  7. Weakness or dizziness.  GO DIRECTLY TO THE NEAREST EMERGENCY ROOM IF YOU HAVE ANY OF THE FOLLOWING:      Difficulty breathing              Chills and/or fever over 101 F   Persistent vomiting and/or vomiting blood   Severe abdominal pain   Severe chest pain   Black, tarry stools   Bleeding- more than one tablespoon   Any other symptom or condition that you feel may need urgent attention  Your doctor recommends these additional instructions:  If any biopsies were taken, your doctors clinic will contact you in 1 to 2   weeks with any results.  - Discharge patient to home.   - Resume previous diet today.   - Continue present medications.   - Await pathology results.   - Repeat colonoscopy in 7-10 years for surveillance based on pathology   results.   - Return to referring physician as previously scheduled.   - Patient has a contact number available for emergencies.  The signs and   symptoms of potential delayed complications were discussed with the   patient.  Return to normal activities tomorrow.  Written discharge   instructions were provided to the patient.  For questions, problems or results please call your physician - Niles Ruiz MD at Work:  [phone].  [facility], EMERGENCY ROOM PHONE NUMBER: [phone]  IF A COMPLICATION OR EMERGENCY SITUATION ARISES AND YOU ARE UNABLE TO REACH   YOUR PHYSICIAN - GO DIRECTLY TO THE EMERGENCY ROOM.  Niles Ruiz MD  9/12/2023 8:10:58 AM  This report has been verified and signed electronically.  Dear patient,  As a result of recent federal legislation (The Federal Cures Act), you may   receive lab or pathology results from your procedure in your [facility]   account before your physician is able to contact you. Your physician or   their representative will relay the results to you with their   recommendations at their soonest availability.  Thank you,  PROVATION

## 2023-09-12 NOTE — H&P
Short Stay Endoscopy History and Physical    PCP - Tamy Vera, DO    Procedure - Colonoscopy  ASA - per anesthesia  Mallampati - per anesthesia  History of Anesthesia problems - no  Family history Anesthesia problems -  no   Plan of anesthesia - General    HPI:  This is a 63 y.o. female here for evaluation of : screening colonoscopy.     ROS:  Constitutional: No fevers, chills  CV: No chest pain  Pulm: No shortness of breath  GI: see HPI  Derm: No rash    Medical History:  has a past medical history of Abnormal Pap smear, Asthma, Cataract, DJD (degenerative joint disease) of knee, Fibrocystic breast, GERD (gastroesophageal reflux disease), and Stroke (2015).    Surgical History:  has a past surgical history that includes Cervix surgery; skin cancers; colpo 2002; Tonsillectomy; TONSILLECTOMY, ADENOIDECTOMY; Breast surgery (1990 and 2001); Breast biopsy (Right, 1990); Breast biopsy (Left, 2001); and radial plate .    Family History: family history includes Amblyopia in her sister; Cancer in her maternal aunt, paternal aunt, paternal grandfather, and paternal uncle; Cataracts in her maternal aunt, maternal uncle, and mother; Dementia in her father and mother; Diabetes in her maternal aunt, maternal grandmother, maternal uncle, paternal aunt, and paternal uncle; Glaucoma in her sister; Heart disease in her maternal grandfather, maternal grandmother, maternal uncle, paternal grandfather, paternal grandmother, and paternal uncle; Melanoma in her maternal aunt; Strabismus in her sister; Stroke in her father and mother.. Otherwise no colon cancer, inflammatory bowel disease, or GI malignancies.    Social History:  reports that she has quit smoking. She has never used smokeless tobacco. She reports current alcohol use of about 10.0 standard drinks of alcohol per week. She reports that she does not use drugs.    Review of patient's allergies indicates:   Allergen Reactions    Tetracyclines Dermatitis    Ceclor  [cefaclor] Other (See Comments)     Canker sores    Keflex [cephalexin] Other (See Comments)     Canker sores    Cat dander     Coconut Hives    Shrimp Hives    Tree and shrub pollen        Medications:   Medications Prior to Admission   Medication Sig Dispense Refill Last Dose    aspirin 81 MG Chew Take 81 mg by mouth once daily.   9/11/2023    atorvastatin (LIPITOR) 20 MG tablet TAKE 1 TABLET BY MOUTH EVERY DAY 90 tablet 3 9/11/2023    desloratadine (CLARINEX) 5 mg tablet TAKE 1 TABLET BY MOUTH EVERY DAY 90 tablet 0 9/11/2023    famotidine (PEPCID) 20 MG tablet    9/11/2023    montelukast (SINGULAIR) 10 mg tablet TAKE 1 TABLET BY MOUTH EVERY DAY IN THE EVENING 90 tablet 0 9/11/2023    albuterol (PROVENTIL/VENTOLIN HFA) 90 mcg/actuation inhaler albuterol sulfate HFA 90 mcg/actuation aerosol inhaler 18 g 11 Unknown    cyclobenzaprine (FLEXERIL) 5 MG tablet Take 1 tablet (5 mg total) by mouth nightly. 1 po qhs prn severe muscle spasms (Patient not taking: Reported on 3/9/2023) 30 tablet 0 Unknown    diclofenac (VOLTAREN) 75 MG EC tablet Take 1 tablet (75 mg total) by mouth 2 (two) times daily. W meal and full glass of water for 3-10 days for moderate pain (Patient not taking: Reported on 3/9/2023) 20 tablet 0     methocarbamoL (ROBAXIN) 500 MG Tab Take 1 tablet (500 mg total) by mouth 3 (three) times daily. W meals for 3-10 days for muscle spasms (Patient not taking: Reported on 3/9/2023) 30 tablet 1 Unknown         Physical Exam:    Vital Signs:   Vitals:    09/12/23 0715   BP: 117/61   Pulse: 86   Resp: 18   Temp: 98.1 °F (36.7 °C)       General Appearance: Well appearing in no acute distress  Eyes:    No scleral icterus  ENT: lips and tongue normal  Lungs: no use of accessory muscles  Heart:  normal rate, regular rhythm  Abdomen: Soft, non tender, non distended   Extremities: no edema  Skin: No rash      Labs:  Lab Results   Component Value Date    WBC 4.95 06/22/2023    HGB 15.9 06/22/2023    HCT 48.5 06/22/2023      06/22/2023    CHOL 162 06/22/2023    TRIG 83 06/22/2023    HDL 71 06/22/2023    ALT 24 06/22/2023    AST 26 06/22/2023     06/22/2023    K 4.1 06/22/2023     06/22/2023    CREATININE 0.7 06/22/2023    BUN 12 06/22/2023    CO2 25 06/22/2023    TSH 2.062 06/22/2023    INR 1.0 09/25/2017    HGBA1C 5.3 06/22/2023       I have explained the risks and benefits of endoscopy procedures to the patient including but not limited to bleeding, perforation, infection, and death.  The patient was asked if they understand and allowed to ask any further questions to their satisfaction.    Irish Jeffery MD

## 2023-09-18 LAB
FINAL PATHOLOGIC DIAGNOSIS: NORMAL
GROSS: NORMAL
Lab: NORMAL
MICROSCOPIC EXAM: NORMAL

## 2023-09-28 ENCOUNTER — PATIENT OUTREACH (OUTPATIENT)
Dept: ADMINISTRATIVE | Facility: HOSPITAL | Age: 63
End: 2023-09-28
Payer: COMMERCIAL

## 2023-10-20 RX ORDER — MONTELUKAST SODIUM 10 MG/1
TABLET ORAL
Qty: 90 TABLET | Refills: 0 | Status: SHIPPED | OUTPATIENT
Start: 2023-10-20 | End: 2024-01-16 | Stop reason: SDUPTHER

## 2023-10-20 RX ORDER — DESLORATADINE 5 MG/1
TABLET ORAL
Qty: 90 TABLET | Refills: 0 | Status: SHIPPED | OUTPATIENT
Start: 2023-10-20 | End: 2024-01-16 | Stop reason: SDUPTHER

## 2023-12-08 ENCOUNTER — OFFICE VISIT (OUTPATIENT)
Dept: OPHTHALMOLOGY | Facility: CLINIC | Age: 63
End: 2023-12-08
Attending: INTERNAL MEDICINE
Payer: COMMERCIAL

## 2023-12-08 DIAGNOSIS — H25.13 NUCLEAR SCLEROSIS, BILATERAL: Primary | ICD-10-CM

## 2023-12-08 PROCEDURE — 99204 PR OFFICE/OUTPT VISIT, NEW, LEVL IV, 45-59 MIN: ICD-10-PCS | Mod: S$GLB,,, | Performed by: OPHTHALMOLOGY

## 2023-12-08 PROCEDURE — 92136 OPHTHALMIC BIOMETRY: CPT | Mod: RT,S$GLB,, | Performed by: OPHTHALMOLOGY

## 2023-12-08 PROCEDURE — 99999 PR PBB SHADOW E&M-EST. PATIENT-LVL III: CPT | Mod: PBBFAC,,, | Performed by: OPHTHALMOLOGY

## 2023-12-08 PROCEDURE — 99204 OFFICE O/P NEW MOD 45 MIN: CPT | Mod: S$GLB,,, | Performed by: OPHTHALMOLOGY

## 2023-12-08 PROCEDURE — 99999 PR PBB SHADOW E&M-EST. PATIENT-LVL III: ICD-10-PCS | Mod: PBBFAC,,, | Performed by: OPHTHALMOLOGY

## 2023-12-08 PROCEDURE — 92136 IOL MASTER - OU - BOTH EYES: ICD-10-PCS | Mod: RT,S$GLB,, | Performed by: OPHTHALMOLOGY

## 2023-12-08 RX ORDER — PHENYLEPHRINE HYDROCHLORIDE 100 MG/ML
1 SOLUTION/ DROPS OPHTHALMIC
Status: CANCELLED | OUTPATIENT
Start: 2023-12-08

## 2023-12-08 RX ORDER — PREDNISOLONE ACETATE-GATIFLOXACIN-BROMFENAC .75; 5; 1 MG/ML; MG/ML; MG/ML
1 SUSPENSION/ DROPS OPHTHALMIC 3 TIMES DAILY
Qty: 5 ML | Refills: 3 | Status: SHIPPED | OUTPATIENT
Start: 2023-12-08

## 2023-12-08 RX ORDER — MOXIFLOXACIN 5 MG/ML
1 SOLUTION/ DROPS OPHTHALMIC
Status: CANCELLED | OUTPATIENT
Start: 2023-12-08

## 2023-12-08 RX ORDER — CYCLOP/TROP/PROPA/PHEN/KET/WAT 1-1-0.1%
1 DROPS (EA) OPHTHALMIC (EYE)
Status: CANCELLED | OUTPATIENT
Start: 2023-12-08

## 2023-12-08 NOTE — PROGRESS NOTES
HPI    Patient is here today for a cataract evaluation. Last eye exam was   09/07/2023 with Dr. Waddell. States distance VA is not sharp and it's   difficult to read smaller prints at near. Patient avoids driving at night   due to the bright lights and glare. States there are a lot of floaters in   her vision OU. No pain but itching associated with allergies.     Eye Meds: None  Past Ocular Sx: 1986 Piece of metal removed in OD  Last edited by Liane Lopez on 12/8/2023  1:27 PM.            Assessment /Plan     For exam results, see Encounter Report.    Nuclear sclerosis, bilateral      Visually Significant Cataract: Patient reports decreased vision consistent with the clinical amount of lenticular opacity, which reaches the level of visual significance and affects activities of daily living.     Specifically, this patient describes difficulty with:  - driving safely at night  - reading road signs  - reading small print  - deciphering medicine bottles  - reading the newspaper  - using the phone  - reading texts     Risks, benefits, and alternatives to cataract surgery were discussed and the consent reviewed. IOL options were discussed, including ATIOLs and the associated side effects and additional patient cost associated with them.   IOL Selections:   Right eye  IOL: CNA0T0 10.5     Left eye  IOL: CNA0T0 10.0    Pt wishes to have RIGHT eye done first.  HIGH MYOPE,   After discussion of refractive cataract surgery options, patient has chosen traditional manual surgery and is satisfied with wearing bifocal glasses if necessary after surgery.

## 2023-12-27 ENCOUNTER — TELEPHONE (OUTPATIENT)
Dept: OPHTHALMOLOGY | Facility: CLINIC | Age: 63
End: 2023-12-27
Payer: COMMERCIAL

## 2023-12-27 DIAGNOSIS — H25.11 NUCLEAR SCLEROTIC CATARACT OF RIGHT EYE: Primary | ICD-10-CM

## 2024-01-16 ENCOUNTER — PATIENT MESSAGE (OUTPATIENT)
Dept: INTERNAL MEDICINE | Facility: CLINIC | Age: 64
End: 2024-01-16
Payer: COMMERCIAL

## 2024-01-16 RX ORDER — MONTELUKAST SODIUM 10 MG/1
10 TABLET ORAL NIGHTLY
Qty: 90 TABLET | Refills: 0 | Status: SHIPPED | OUTPATIENT
Start: 2024-01-16 | End: 2024-04-14

## 2024-01-16 RX ORDER — DESLORATADINE 5 MG/1
TABLET ORAL
Qty: 90 TABLET | Refills: 0 | OUTPATIENT
Start: 2024-01-16

## 2024-01-16 RX ORDER — DESLORATADINE 5 MG/1
5 TABLET ORAL DAILY
Qty: 90 TABLET | Refills: 0 | Status: SHIPPED | OUTPATIENT
Start: 2024-01-16 | End: 2024-04-14

## 2024-01-16 RX ORDER — MONTELUKAST SODIUM 10 MG/1
TABLET ORAL
Qty: 90 TABLET | Refills: 0 | OUTPATIENT
Start: 2024-01-16

## 2024-01-16 NOTE — TELEPHONE ENCOUNTER
Please advise patient that  is no longer practicing at [facility] as she will need to establish with a new primary care.  Her meds were refilled for 90 days.

## 2024-01-25 ENCOUNTER — TELEPHONE (OUTPATIENT)
Dept: OPHTHALMOLOGY | Facility: CLINIC | Age: 64
End: 2024-01-25
Payer: COMMERCIAL

## 2024-01-25 DIAGNOSIS — H25.12 NUCLEAR SCLEROTIC CATARACT OF LEFT EYE: Primary | ICD-10-CM

## 2024-01-29 ENCOUNTER — TELEPHONE (OUTPATIENT)
Dept: OPHTHALMOLOGY | Facility: CLINIC | Age: 64
End: 2024-01-29
Payer: COMMERCIAL

## 2024-01-29 NOTE — TELEPHONE ENCOUNTER
Patient given arrival time of 8:00 am on Thursday February 1 . Nothing to eat or drink after 11:59 pm.  Start drops into the operative eye today. [address] [facility]

## 2024-01-30 ENCOUNTER — TELEPHONE (OUTPATIENT)
Dept: OPHTHALMOLOGY | Facility: CLINIC | Age: 64
End: 2024-01-30
Payer: COMMERCIAL

## 2024-01-30 NOTE — TELEPHONE ENCOUNTER
----- Message from Madison Hooper sent at 1/29/2024  4:04 PM CST -----  Regarding: Requesting Call Back  Pt asked to speak to you about additional questions she has about 2/1 surgery.    Call back-  [phone]

## 2024-01-30 NOTE — TELEPHONE ENCOUNTER
----- Message from Man Cm sent at 1/30/2024 11:29 AM CST -----  Regarding: pt advice  Contact: [phone]  Pt is calling to see about prednisolon/gatiflox/bromfenac (PREDNISOL ACE-GATIFLOX-BROMFEN) 1-0.5-0.075 % DrpS she's having surgery on 02/01/2024 .please contact pt asap

## 2024-01-31 NOTE — PRE-PROCEDURE INSTRUCTIONS
Unable to reach pt via phone.  Left voicemail with arrival time also informing pt of need for responsible  accompaniment and instructing pt to follow pre-procedure instructions provided via [facility] portal.  The following messages was sent to pt's portal.       Dear Marilee     Below you will find basic pre-procedure instructions in preparation for your procedure on 2/1/24 with      - Nothing to eat or drink after midnight the night before your procedure until after your procedure, except AM meds with small sips of water.     - HOLD all Diabetic meds AM of surgery  - HOLD all Insulin AM of surgery  - HOLD all Fluid pills AM of surgery  - HOLD all non-insulin shots until after surgery (Ozempic, Mounjaro, Trulicity, Victoza, Byetta, Wegovy and Adlyxin) (up to 7 days prior)  - HOLD all vitamins and herbal meds AM of surgery   - TAKE all B/P meds, EXCEPT those that contain a fluid pill  - USE inhalers as needed and bring AM of surgery  - USE eye drops as directed  -TAKE blood thinner meds AM of surgery unless otherwise instructed     - Shower and wash face with dial soap for 3 mins PM prior and AM of surgery  - No powder, lotions, creams, oils, gels, ointments, makeup,  or jewelry    - Wear comfortable clothing (button up shirt)     (Patient is required to have a responsible ride to transport home, ride may not leave while patient is in surgery)     -- [facility], 2nd floor Surgery Center, located   @ [address] [place] [address]  2nd Floor Registration        If you have any questions or concerns please feel free to contact your surgeon's office.                 JULIA Boston  [facility]  Pre-Admit - Anesthesia Dept

## 2024-02-01 ENCOUNTER — ANESTHESIA (OUTPATIENT)
Dept: SURGERY | Facility: HOSPITAL | Age: 64
End: 2024-02-01
Payer: COMMERCIAL

## 2024-02-01 ENCOUNTER — OFFICE VISIT (OUTPATIENT)
Dept: OPHTHALMOLOGY | Facility: CLINIC | Age: 64
End: 2024-02-01
Payer: COMMERCIAL

## 2024-02-01 ENCOUNTER — ANESTHESIA EVENT (OUTPATIENT)
Dept: SURGERY | Facility: HOSPITAL | Age: 64
End: 2024-02-01
Payer: COMMERCIAL

## 2024-02-01 ENCOUNTER — HOSPITAL ENCOUNTER (OUTPATIENT)
Facility: HOSPITAL | Age: 64
Discharge: HOME | End: 2024-02-01
Attending: OPHTHALMOLOGY | Admitting: OPHTHALMOLOGY
Payer: COMMERCIAL

## 2024-02-01 VITALS
DIASTOLIC BLOOD PRESSURE: 68 MMHG | SYSTOLIC BLOOD PRESSURE: 126 MMHG | OXYGEN SATURATION: 95 % | RESPIRATION RATE: 20 BRPM | HEART RATE: 73 BPM | TEMPERATURE: 98 F

## 2024-02-01 DIAGNOSIS — H25.13 NUCLEAR SCLEROSIS, BILATERAL: ICD-10-CM

## 2024-02-01 DIAGNOSIS — H25.11 NUCLEAR SCLEROTIC CATARACT OF RIGHT EYE: ICD-10-CM

## 2024-02-01 DIAGNOSIS — H25.11 NUCLEAR SCLEROTIC CATARACT OF RIGHT EYE: Primary | ICD-10-CM

## 2024-02-01 DIAGNOSIS — Z98.890 POST-OPERATIVE STATE: Primary | ICD-10-CM

## 2024-02-01 PROCEDURE — 36000707: Performed by: OPHTHALMOLOGY

## 2024-02-01 PROCEDURE — D9220A PRA ANESTHESIA: Mod: ,,, | Performed by: NURSE ANESTHETIST, CERTIFIED REGISTERED

## 2024-02-01 PROCEDURE — 25000003 PHARM REV CODE 250: Performed by: OPHTHALMOLOGY

## 2024-02-01 PROCEDURE — 99900035 HC TECH TIME PER 15 MIN (STAT)

## 2024-02-01 PROCEDURE — 99999 PR PBB SHADOW E&M-EST. PATIENT-LVL III: CPT | Mod: PBBFAC,,, | Performed by: OPHTHALMOLOGY

## 2024-02-01 PROCEDURE — 71000015 HC POSTOP RECOV 1ST HR: Performed by: OPHTHALMOLOGY

## 2024-02-01 PROCEDURE — 94761 N-INVAS EAR/PLS OXIMETRY MLT: CPT

## 2024-02-01 PROCEDURE — 36000706: Performed by: OPHTHALMOLOGY

## 2024-02-01 PROCEDURE — 37000008 HC ANESTHESIA 1ST 15 MINUTES: Performed by: OPHTHALMOLOGY

## 2024-02-01 PROCEDURE — V2632 POST CHMBR INTRAOCULAR LENS: HCPCS | Performed by: OPHTHALMOLOGY

## 2024-02-01 PROCEDURE — 66984 XCAPSL CTRC RMVL W/O ECP: CPT | Mod: RT,,, | Performed by: OPHTHALMOLOGY

## 2024-02-01 PROCEDURE — 63600175 PHARM REV CODE 636 W HCPCS: Performed by: ANESTHESIOLOGY

## 2024-02-01 PROCEDURE — 99024 POSTOP FOLLOW-UP VISIT: CPT | Mod: S$GLB,,, | Performed by: OPHTHALMOLOGY

## 2024-02-01 PROCEDURE — 37000009 HC ANESTHESIA EA ADD 15 MINS: Performed by: OPHTHALMOLOGY

## 2024-02-01 DEVICE — IMPLANTABLE DEVICE: Type: IMPLANTABLE DEVICE | Site: EYE | Status: FUNCTIONAL

## 2024-02-01 RX ORDER — CYCLOP/TROP/PROPA/PHEN/KET/WAT 1-1-0.1%
1 DROPS (EA) OPHTHALMIC (EYE)
Status: COMPLETED | OUTPATIENT
Start: 2024-02-01 | End: 2024-02-01

## 2024-02-01 RX ORDER — MOXIFLOXACIN 5 MG/ML
SOLUTION/ DROPS OPHTHALMIC
Status: DISCONTINUED | OUTPATIENT
Start: 2024-02-01 | End: 2024-02-01 | Stop reason: HOSPADM

## 2024-02-01 RX ORDER — MOXIFLOXACIN 5 MG/ML
1 SOLUTION/ DROPS OPHTHALMIC
Status: DISCONTINUED | OUTPATIENT
Start: 2024-02-01 | End: 2024-02-01 | Stop reason: HOSPADM

## 2024-02-01 RX ORDER — PROPARACAINE HYDROCHLORIDE 5 MG/ML
1 SOLUTION/ DROPS OPHTHALMIC
Status: DISCONTINUED | OUTPATIENT
Start: 2024-02-01 | End: 2024-02-01 | Stop reason: HOSPADM

## 2024-02-01 RX ORDER — CETIRIZINE HYDROCHLORIDE 10 MG/1
10 TABLET ORAL DAILY
COMMUNITY

## 2024-02-01 RX ORDER — PHENYLEPHRINE HYDROCHLORIDE 100 MG/ML
1 SOLUTION/ DROPS OPHTHALMIC
Status: DISCONTINUED | OUTPATIENT
Start: 2024-02-01 | End: 2024-02-01 | Stop reason: HOSPADM

## 2024-02-01 RX ORDER — PROPARACAINE HYDROCHLORIDE 5 MG/ML
SOLUTION/ DROPS OPHTHALMIC
Status: DISCONTINUED | OUTPATIENT
Start: 2024-02-01 | End: 2024-02-01 | Stop reason: HOSPADM

## 2024-02-01 RX ORDER — LIDOCAINE HYDROCHLORIDE 40 MG/ML
INJECTION, SOLUTION RETROBULBAR
Status: DISCONTINUED | OUTPATIENT
Start: 2024-02-01 | End: 2024-02-01 | Stop reason: HOSPADM

## 2024-02-01 RX ORDER — MIDAZOLAM HYDROCHLORIDE 1 MG/ML
INJECTION INTRAMUSCULAR; INTRAVENOUS
Status: DISCONTINUED | OUTPATIENT
Start: 2024-02-01 | End: 2024-02-01

## 2024-02-01 RX ORDER — ACETAMINOPHEN 325 MG/1
650 TABLET ORAL EVERY 4 HOURS PRN
Status: DISCONTINUED | OUTPATIENT
Start: 2024-02-01 | End: 2024-02-01 | Stop reason: HOSPADM

## 2024-02-01 RX ADMIN — MOXIFLOXACIN OPHTHALMIC 1 DROP: 5 SOLUTION/ DROPS OPHTHALMIC at 08:02

## 2024-02-01 RX ADMIN — MOXIFLOXACIN 1 DROP: 5 SOLUTION/ DROPS OPHTHALMIC at 09:02

## 2024-02-01 RX ADMIN — Medication 1 DROP: at 08:02

## 2024-02-01 RX ADMIN — MIDAZOLAM HYDROCHLORIDE 2 MG: 1 INJECTION INTRAMUSCULAR; INTRAVENOUS at 09:02

## 2024-02-01 NOTE — ANESTHESIA PREPROCEDURE EVALUATION
02/01/2024  Marilee Mckee is a 63 y.o., female.       Patient Active Problem List   Diagnosis    DJD (degenerative joint disease) of knee    Asthma    GERD (gastroesophageal reflux disease)    Environmental allergies    Dyslipidemia    Completed stroke    Familial tremor    History of skin cancer    Distal radius fracture, left    Left wrist pain    Decreased ROM of wrist    Impaired mobility and ADLs    Multiple thyroid nodules    Primary osteoarthritis of first carpometacarpal joint of right hand    Decreased strength of trunk and back    Impaired strength of hip muscles    Facet arthropathy, lumbar    Chronic right hip pain       Past Medical History:   Diagnosis Date    Abnormal Pap smear     Asthma     Cataract     DJD (degenerative joint disease) of knee     Fibrocystic breast     GERD (gastroesophageal reflux disease)     Stroke 2015    some memory loss and weakness in right arm       ECHO: No results found for this or any previous visit.      There is no height or weight on file to calculate BMI.    Tobacco Use: Medium Risk (2/1/2024)    Patient History     Smoking Tobacco Use: Former     Smokeless Tobacco Use: Never     Passive Exposure: Not on file       Social History     Substance and Sexual Activity   Drug Use No        Alcohol Use: Heavy Drinker (9/15/2023)    AUDIT-C     Frequency of Alcohol Consumption: 4 or more times a week     Average Number of Drinks: 1 or 2     Frequency of Binge Drinking: Less than monthly       Review of patient's allergies indicates:   Allergen Reactions    Tetracyclines Dermatitis    Ceclor [cefaclor] Other (See Comments)     Canker sores    Keflex [cephalexin] Other (See Comments)     Canker sores    Cat dander     Coconut Hives    Shrimp Hives    Tree and shrub pollen          Airway:  No value filed.      Pre-op Assessment    I have reviewed the Patient  Summary Reports.     I have reviewed the Nursing Notes. I have reviewed the NPO Status.   I have reviewed the Medications.     Review of Systems  Anesthesia Hx:  No problems with previous Anesthesia             Denies Family Hx of Anesthesia complications.    Denies Personal Hx of Anesthesia complications.                    Hematology/Oncology:  Hematology Normal   Oncology Normal                                   EENT/Dental:  EENT/Dental Normal           Cardiovascular:  Cardiovascular Normal Exercise tolerance: good                 ECG has been reviewed.                          Pulmonary:    Asthma mild                   Renal/:  Renal/ Normal                 Hepatic/GI:     GERD             Musculoskeletal:  Arthritis               Neurological:   CVA                                    Endocrine:  Endocrine Normal            Dermatological:  Skin Normal    Psych:  Psychiatric Normal                    Physical Exam  General: Well nourished    Airway:  Mallampati: II   Mouth Opening: Normal  TM Distance: Normal  Tongue: Normal  Neck ROM: Normal ROM    Dental:  Intact        Anesthesia Plan  Type of Anesthesia, risks & benefits discussed:    Anesthesia Type: MAC  Intra-op Monitoring Plan: Standard ASA Monitors  Post Op Pain Control Plan: multimodal analgesia and IV/PO Opioids PRN  Induction:  IV  Informed Consent: Informed consent signed with the Patient and all parties understand the risks and agree with anesthesia plan.  All questions answered. Patient consented to blood products? No  ASA Score: 3  Day of Surgery Review of History & Physical: H&P Update referred to the surgeon/provider.I have interviewed and examined the patient. I have reviewed the patient's H&P dated: There are no significant changes.     Ready For Surgery From Anesthesia Perspective.     .

## 2024-02-01 NOTE — DISCHARGE INSTRUCTIONS
CATARACT SURGERY    POST-OPERATIVE INSTRUCTIONS    · Apply drops THREE times a day into operative eye for 30 days.    · DO NOT rub your eye    · Wear protective sunglasses during the day    · Resume moderate activity    · Bathe/shower/wash face normally    · DO NOT apply makeup around the operative eye for 1 week.         You should expect    - Blurry vision and halos for 24-48 hours    - Dilated pupil for 24-48 hours    - Scratchy feeling in the eye for 1-2 days    - Curved shadow in your peripheral vision for 2-3 weeks    - Occasional flickering of lights for up to 1 week    -If you experience severe pain or nausea, please call Dr Shook or the on-call doctor at [phone]    - Plan to see Dr Shook tomorrow .      [facility]    [address] [facility] [address]    ** Most patients can drive the next day, but if you do not feel comfortable driving, please arrange for transportation.

## 2024-02-01 NOTE — DISCHARGE SUMMARY
Outcome: Successful outpatient ophthalmic surgical procedure  Preprinted Instructions given to patient.  Regular diet.  Activity: No restrictions  Meds: see Med Rec  Condition: stable  Follow up: 1 day with Dr Shook  Disposition: Home  Diagnosis: s/p eye surgery  Date of discharge: 02/01/2024

## 2024-02-01 NOTE — PROGRESS NOTES
Assessment /Plan     For exam results, see Encounter Report.    Post-operative state    Nuclear sclerotic cataract of right eye      Slit lamp exam:  L/L: nl  K: clear, wound sealed  AC: 1+ cell  Lens: IOL centered and stable    POD1 s/p Phaco/IOL  Appropriate precautions and post op medications reviewed.  Patient instructed to call or come in if symptoms of redness, decreased vision, or pain are experienced.

## 2024-02-01 NOTE — OP NOTE
SURGEON:  Gisel Shook M.D.    PREOPERATIVE DIAGNOSIS:    Nuclear Sclerotic Cataract Right Eye    POSTOPERATIVE DIAGNOSIS:    Nuclear Sclerotic Cataract Right Eye    PROCEDURES:    Phacoemulsification with  intraocular lens, Right eye (34269)    DATE OF SURGERY: 02/01/2024    IMPLANT: cna0t0 10.5    ANESTHESIA:  MAC with topical Lidocaine    COMPLICATIONS:  None    ESTIMATED BLOOD LOSS: None    SPECIMENS: None    INDICATIONS:    The patient has a history of painless progressive visual loss and difficulty with activities of daily living, which specifically include difficult driving at night due to glare and difficulty reading small print, secondary to cataract formation.  After a thorough discussion of the risks, benefits, and alternatives to cataract surgery, including, but not limited to, the rare risks of infection, retinal detachment, hemorrhage, need for additional surgery, loss of vision, and even loss of the eye, the patient voices understanding and desires to proceed.    DESCRIPTION OF PROCEDURE:    The patients IOL calculations were reviewed, and the lens selection confirmed.   After verification and marking of the proper eye in the preop holding area, the patient was brought to the operating room in supine position where the eye was prepped and draped in standard sterile fashion with 5% Betadine and a lid speculum placed in the eye.   Topical 4% Lidocaine was used in addition to the preoperative anesthesia and the procedure was begun by the creation of a paracentesis incision through which viscoelastic was used to fill the anterior chamber.  Next, a keratome blade was used to create a triplanar temporal clear corneal incision and a cystotome and Utrata forceps used to fashion a continuous curvilinear capsulorrhexis.  Hydrodissection was carried out using the Thomas hydrodissection cannula and the nucleus was found to be mobile.  Phacoemulsification of the nucleus was carried out using a quick chop technique,  and all remaining epinuclear and cortical material was removed.  The eye was then reformed with Viscoelastic and the  intraocular lens was implanted into the capsular bag.  All remaining viscoelastics were removed from the eye and at the end of the case the pupil was round, the lens was well-centered within the capsular bag and all wounds were found to be water tight.  Drops of Vigamox and Pred Forte were instilled and a shield was placed over the eye. The patient will follow up with Dr. Shook in the morning.

## 2024-02-01 NOTE — TRANSFER OF CARE
Anesthesia Transfer of Care Note    Patient: Marilee Mckee    Procedure(s) Performed: Procedure(s) (LRB):  EXTRACTION, CATARACT, WITH IOL INSERTION (Right)    Patient location: PACU    Anesthesia Type: MAC    Transport from OR: Transported from OR on room air with adequate spontaneous ventilation    Post pain: adequate analgesia    Post assessment: no apparent anesthetic complications    Post vital signs: stable    Level of consciousness: awake    Nausea/Vomiting: no nausea/vomiting    Complications: none    Transfer of care protocol was followed      Last vitals: Visit Vitals  BP (!) 140/66 (BP Location: Right arm, Patient Position: Sitting)   Pulse 71   Temp 36.5 °C (97.7 °F) (Skin)   Resp 16   LMP 04/21/2012   SpO2 99%   Breastfeeding No

## 2024-02-01 NOTE — H&P
Pre-Operative History & Physical  Ophthalmology      SUBJECTIVE:     History of Present Illness:  Patient is a 63 y.o. female presents with Nuclear sclerotic cataract of right eye [H25.11]  Nuclear sclerosis, bilateral [H25.13].    MEDICATIONS:   PTA Medications   Medication Sig    aspirin 81 MG Chew Take 81 mg by mouth once daily.    atorvastatin (LIPITOR) 20 MG tablet TAKE 1 TABLET BY MOUTH EVERY DAY    cetirizine (ZYRTEC) 10 MG tablet Take 10 mg by mouth once daily.    desloratadine (CLARINEX) 5 mg tablet Take 1 tablet (5 mg total) by mouth once daily.    famotidine (PEPCID) 20 MG tablet     montelukast (SINGULAIR) 10 mg tablet Take 1 tablet (10 mg total) by mouth every evening.    prednisolon/gatiflox/bromfenac (PREDNISOL ACE-GATIFLOX-BROMFEN) 1-0.5-0.075 % DrpS Apply 1 drop to eye 3 (three) times daily. in operative eye for 1 month after surgery    albuterol (PROVENTIL/VENTOLIN HFA) 90 mcg/actuation inhaler albuterol sulfate HFA 90 mcg/actuation aerosol inhaler    [DISCONTINUED] methocarbamoL (ROBAXIN) 500 MG Tab Take 1 tablet (500 mg total) by mouth 3 (three) times daily. W meals for 3-10 days for muscle spasms (Patient not taking: Reported on 3/9/2023)       ALLERGIES:   Review of patient's allergies indicates:   Allergen Reactions    Tetracyclines Dermatitis    Ceclor [cefaclor] Other (See Comments)     Canker sores    Keflex [cephalexin] Other (See Comments)     Canker sores    Cat dander     Coconut Hives    Shrimp Hives    Tree and shrub pollen        PAST MEDICAL HISTORY:   Past Medical History:   Diagnosis Date    Abnormal Pap smear     Asthma     Cataract     DJD (degenerative joint disease) of knee     Fibrocystic breast     GERD (gastroesophageal reflux disease)     Stroke 2015    some memory loss and weakness in right arm     PAST SURGICAL HISTORY:   Past Surgical History:   Procedure Laterality Date    BREAST BIOPSY Right 1990    benign    BREAST BIOPSY Left 2001    benign    BREAST SURGERY  1990  and 2001    right side and left side/begin fibroid tumor    CERVIX SURGERY      COLONOSCOPY N/A 9/12/2023    Procedure: COLONOSCOPY;  Surgeon: Niles Ruiz MD;  Location: [facility] ([address] [place] [address]);  Service: Endoscopy;  Laterality: N/A;  sutab, instructions portal- LW  2/1/23-precall completed. pt requesting to reschedule,  notified-    9/5/23 pre call complete -helen    nikolai 2002      radial plate       skin cancers      TONSILLECTOMY      childhood    TONSILLECTOMY, ADENOIDECTOMY      childhood     PAST FAMILY HISTORY:   Family History   Problem Relation Age of Onset    Stroke Mother     Cataracts Mother     Dementia Mother     Stroke Father     Dementia Father     Diabetes Maternal Aunt     Cancer Maternal Aunt         thyroid cancer    Melanoma Maternal Aunt     Cataracts Maternal Aunt     Heart disease Maternal Uncle     Cataracts Maternal Uncle     Diabetes Maternal Uncle     Heart disease Paternal Uncle     Cancer Paternal Uncle         prostate cancer    Diabetes Paternal Uncle     Heart disease Maternal Grandmother     Diabetes Maternal Grandmother     Heart disease Maternal Grandfather     Heart disease Paternal Grandmother     Heart disease Paternal Grandfather     Cancer Paternal Grandfather         bone cancer    Cancer Paternal Aunt         stomach cancer    Diabetes Paternal Aunt     Glaucoma Sister     Strabismus Sister     Amblyopia Sister     Breast cancer Neg Hx     Colon cancer Neg Hx     Ovarian cancer Neg Hx     Macular degeneration Neg Hx     Retinal detachment Neg Hx      SOCIAL HISTORY:   Social History     Tobacco Use    Smoking status: Former    Smokeless tobacco: Never    Tobacco comments:     quit >34 years ago   Substance Use Topics    Alcohol use: Yes     Alcohol/week: 10.0 standard drinks of alcohol     Types: 10 Glasses of wine per week     Comment: has at least one drink daily/10-12 per week    Drug use: No        MENTAL STATUS: Alert    REVIEW OF SYSTEMS:  Negative    OBJECTIVE:     Vital Signs (Most Recent)  Temp: 97.7 °F (36.5 °C) (02/01/24 0835)  Pulse: 71 (02/01/24 0835)  Resp: 16 (02/01/24 0835)  BP: (!) 140/66 (02/01/24 0835)  SpO2: 99 % (02/01/24 0835)    Physical Exam:  General: NAD  HEENT: cataract OD  Lungs: Adequate respirations, symmetrical movements, non-labored  Heart: Intact distal pulses  Abdomen: Soft, nondistended, nontender    ASSESSMENT/PLAN:     Patient is a 63 y.o. female with Nuclear sclerotic cataract of right eye [H25.11]  Nuclear sclerosis, bilateral [H25.13].    - Plan for surgical correction with CEIOL .   - Allergies reviewed:   Review of patient's allergies indicates:   Allergen Reactions    Tetracyclines Dermatitis    Ceclor [cefaclor] Other (See Comments)     Canker sores    Keflex [cephalexin] Other (See Comments)     Canker sores    Cat dander     Coconut Hives    Shrimp Hives    Tree and shrub pollen      - Has not taken blood thinners (includes ASA, NSAIDS, BC Powder, Goody's Powder, Excedrine, Eliquis, Xarelto, Pradaxa, etc.) for over 5 days.   - Risks/benefits/alternatives of the procedure including, but not limited to scarring, bleeding, infection, loss or decreased vision, and/or need for possible repeat surgery discussed with the patient and family.  - Informed consent obtained prior to surgery and the patient/family voiced good understanding.    Elizabeth Ochoa MD  [employer] [facility], PGY-3

## 2024-02-01 NOTE — ANESTHESIA POSTPROCEDURE EVALUATION
Anesthesia Post Evaluation    Patient: Marilee Mckee    Procedure(s) Performed: Procedure(s) (LRB):  EXTRACTION, CATARACT, WITH IOL INSERTION (Right)    Final Anesthesia Type: MAC      Patient location during evaluation: PACU  Patient participation: Yes- Able to Participate  Level of consciousness: awake and alert  Post-procedure vital signs: reviewed and stable  Pain management: adequate  Airway patency: patent    PONV status at discharge: No PONV  Anesthetic complications: no      Cardiovascular status: stable  Respiratory status: spontaneous ventilation  Hydration status: euvolemic  Follow-up not needed.              Vitals Value Taken Time   /68 02/01/24 0942   Temp 36.7 °C (98.1 °F) 02/01/24 0933   Pulse 70 02/01/24 0945   Resp 20 02/01/24 0942   SpO2 96 % 02/01/24 0945   Vitals shown include unvalidated device data.      No case tracking events are documented in the log.      Pain/Karie Score: Karie Score: 10 (2/1/2024  9:33 AM)

## 2024-02-06 ENCOUNTER — PATIENT MESSAGE (OUTPATIENT)
Dept: OBSTETRICS AND GYNECOLOGY | Facility: CLINIC | Age: 64
End: 2024-02-06
Payer: COMMERCIAL

## 2024-02-06 DIAGNOSIS — Z12.31 SCREENING MAMMOGRAM, ENCOUNTER FOR: Primary | ICD-10-CM

## 2024-02-09 ENCOUNTER — OFFICE VISIT (OUTPATIENT)
Dept: OPHTHALMOLOGY | Facility: CLINIC | Age: 64
End: 2024-02-09
Payer: COMMERCIAL

## 2024-02-09 DIAGNOSIS — Z98.890 POST-OPERATIVE STATE: Primary | ICD-10-CM

## 2024-02-09 DIAGNOSIS — H25.11 NUCLEAR SCLEROSIS OF RIGHT EYE: ICD-10-CM

## 2024-02-09 PROCEDURE — 99024 POSTOP FOLLOW-UP VISIT: CPT | Mod: S$GLB,,, | Performed by: OPHTHALMOLOGY

## 2024-02-09 PROCEDURE — 99999 PR PBB SHADOW E&M-EST. PATIENT-LVL III: CPT | Mod: PBBFAC,,, | Performed by: OPHTHALMOLOGY

## 2024-02-09 RX ORDER — SODIUM CHLORIDE 0.9 % (FLUSH) 0.9 %
10 SYRINGE (ML) INJECTION
Status: SHIPPED | OUTPATIENT
Start: 2024-02-09

## 2024-02-09 RX ORDER — PHENYLEPHRINE HYDROCHLORIDE 100 MG/ML
1 SOLUTION/ DROPS OPHTHALMIC
Status: CANCELLED | OUTPATIENT
Start: 2024-02-09

## 2024-02-09 RX ORDER — MOXIFLOXACIN 5 MG/ML
1 SOLUTION/ DROPS OPHTHALMIC
Status: CANCELLED | OUTPATIENT
Start: 2024-02-09

## 2024-02-09 RX ORDER — CYCLOP/TROP/PROPA/PHEN/KET/WAT 1-1-0.1%
1 DROPS (EA) OPHTHALMIC (EYE)
Status: CANCELLED | OUTPATIENT
Start: 2024-02-09

## 2024-02-09 NOTE — PROGRESS NOTES
HPI    02/01/2024 IMPLANT: [email or web address] 10.5 OD    Patient is here today for 1 week phaco OD. Vision looks good. No pain or   discomfort. Eyes feels tired at the end of the day.    PGB TID OD        Last edited by Liane Lopez on 2/9/2024 10:12 AM.            Assessment /Plan     For exam results, see Encounter Report.    Post-operative state    Nuclear sclerosis of right eye      Slit lamp exam:  L/L: nl  K: clear, wound sealed  AC: trace cell  Iris/Lens: IOL centered and stable    POW1 s/p phaco: Surgery healing well with no signs of infection or abnormal inflammation.    Patient wishes to proceed with surgery in the second eye. Risks, benefits, alternatives reviewed. IOL selection reviewed.     Left eye  IOL: CNA0T0 10.0      HIGH MYOPE,   After discussion of refractive cataract surgery options, patient has chosen traditional manual surgery and is satisfied with wearing bifocal glasses if necessary after surgery.

## 2024-02-09 NOTE — H&P (VIEW-ONLY)
HPI    02/01/2024 IMPLANT: [email or web address] 10.5 OD    Patient is here today for 1 week phaco OD. Vision looks good. No pain or   discomfort. Eyes feels tired at the end of the day.    PGB TID OD        Last edited by Liane Lopez on 2/9/2024 10:12 AM.            Assessment /Plan     For exam results, see Encounter Report.    Post-operative state    Nuclear sclerosis of right eye      Slit lamp exam:  L/L: nl  K: clear, wound sealed  AC: trace cell  Iris/Lens: IOL centered and stable    POW1 s/p phaco: Surgery healing well with no signs of infection or abnormal inflammation.    Patient wishes to proceed with surgery in the second eye. Risks, benefits, alternatives reviewed. IOL selection reviewed.     Left eye  IOL: CNA0T0 10.0      HIGH MYOPE,   After discussion of refractive cataract surgery options, patient has chosen traditional manual surgery and is satisfied with wearing bifocal glasses if necessary after surgery.                   
OR 2

## 2024-02-19 ENCOUNTER — TELEPHONE (OUTPATIENT)
Dept: OPHTHALMOLOGY | Facility: CLINIC | Age: 64
End: 2024-02-19
Payer: COMMERCIAL

## 2024-02-19 NOTE — TELEPHONE ENCOUNTER
Patient given arrival time of 11:00 am on Thursday February 22. Nothing to eat or drink after 11:59 pm. Start drops into the operative eye today. [facility]

## 2024-02-21 ENCOUNTER — PATIENT MESSAGE (OUTPATIENT)
Dept: PREADMISSION TESTING | Facility: HOSPITAL | Age: 64
End: 2024-02-21
Payer: COMMERCIAL

## 2024-02-22 ENCOUNTER — ANESTHESIA (OUTPATIENT)
Dept: SURGERY | Facility: HOSPITAL | Age: 64
End: 2024-02-22
Payer: COMMERCIAL

## 2024-02-22 ENCOUNTER — HOSPITAL ENCOUNTER (OUTPATIENT)
Facility: HOSPITAL | Age: 64
Discharge: HOME | End: 2024-02-22
Attending: OPHTHALMOLOGY | Admitting: OPHTHALMOLOGY
Payer: COMMERCIAL

## 2024-02-22 ENCOUNTER — ANESTHESIA EVENT (OUTPATIENT)
Dept: SURGERY | Facility: HOSPITAL | Age: 64
End: 2024-02-22
Payer: COMMERCIAL

## 2024-02-22 VITALS
HEIGHT: 64 IN | WEIGHT: 160 LBS | RESPIRATION RATE: 18 BRPM | OXYGEN SATURATION: 97 % | HEART RATE: 80 BPM | TEMPERATURE: 98 F | DIASTOLIC BLOOD PRESSURE: 60 MMHG | SYSTOLIC BLOOD PRESSURE: 110 MMHG | BODY MASS INDEX: 27.31 KG/M2

## 2024-02-22 DIAGNOSIS — H25.11 NUCLEAR SCLEROSIS OF RIGHT EYE: ICD-10-CM

## 2024-02-22 DIAGNOSIS — H25.12 NUCLEAR SCLEROTIC CATARACT OF LEFT EYE: Primary | ICD-10-CM

## 2024-02-22 DIAGNOSIS — Z98.890 POST-OPERATIVE STATE: ICD-10-CM

## 2024-02-22 PROCEDURE — 71000015 HC POSTOP RECOV 1ST HR: Performed by: OPHTHALMOLOGY

## 2024-02-22 PROCEDURE — 36000706: Performed by: OPHTHALMOLOGY

## 2024-02-22 PROCEDURE — 94761 N-INVAS EAR/PLS OXIMETRY MLT: CPT

## 2024-02-22 PROCEDURE — 99900035 HC TECH TIME PER 15 MIN (STAT)

## 2024-02-22 PROCEDURE — 63600175 PHARM REV CODE 636 W HCPCS: Performed by: NURSE ANESTHETIST, CERTIFIED REGISTERED

## 2024-02-22 PROCEDURE — V2632 POST CHMBR INTRAOCULAR LENS: HCPCS | Performed by: OPHTHALMOLOGY

## 2024-02-22 PROCEDURE — 25000003 PHARM REV CODE 250: Performed by: OPHTHALMOLOGY

## 2024-02-22 PROCEDURE — 36000707: Performed by: OPHTHALMOLOGY

## 2024-02-22 PROCEDURE — D9220A PRA ANESTHESIA: Mod: ,,, | Performed by: NURSE ANESTHETIST, CERTIFIED REGISTERED

## 2024-02-22 PROCEDURE — 37000009 HC ANESTHESIA EA ADD 15 MINS: Performed by: OPHTHALMOLOGY

## 2024-02-22 PROCEDURE — 66984 XCAPSL CTRC RMVL W/O ECP: CPT | Mod: 79,LT,, | Performed by: OPHTHALMOLOGY

## 2024-02-22 PROCEDURE — 37000008 HC ANESTHESIA 1ST 15 MINUTES: Performed by: OPHTHALMOLOGY

## 2024-02-22 DEVICE — IMPLANTABLE DEVICE: Type: IMPLANTABLE DEVICE | Site: EYE | Status: FUNCTIONAL

## 2024-02-22 RX ORDER — MOXIFLOXACIN 5 MG/ML
1 SOLUTION/ DROPS OPHTHALMIC
Status: COMPLETED | OUTPATIENT
Start: 2024-02-22 | End: 2024-02-22

## 2024-02-22 RX ORDER — PHENYLEPHRINE HYDROCHLORIDE 100 MG/ML
1 SOLUTION/ DROPS OPHTHALMIC
Status: DISCONTINUED | OUTPATIENT
Start: 2024-02-22 | End: 2024-02-22 | Stop reason: HOSPADM

## 2024-02-22 RX ORDER — CYCLOP/TROP/PROPA/PHEN/KET/WAT 1-1-0.1%
1 DROPS (EA) OPHTHALMIC (EYE)
Status: COMPLETED | OUTPATIENT
Start: 2024-02-22 | End: 2024-02-22

## 2024-02-22 RX ORDER — MOXIFLOXACIN 5 MG/ML
SOLUTION/ DROPS OPHTHALMIC
Status: DISCONTINUED | OUTPATIENT
Start: 2024-02-22 | End: 2024-02-22 | Stop reason: HOSPADM

## 2024-02-22 RX ORDER — PROPARACAINE HYDROCHLORIDE 5 MG/ML
SOLUTION/ DROPS OPHTHALMIC
Status: DISCONTINUED | OUTPATIENT
Start: 2024-02-22 | End: 2024-02-22 | Stop reason: HOSPADM

## 2024-02-22 RX ORDER — LIDOCAINE HYDROCHLORIDE 40 MG/ML
INJECTION, SOLUTION RETROBULBAR
Status: DISCONTINUED | OUTPATIENT
Start: 2024-02-22 | End: 2024-02-22 | Stop reason: HOSPADM

## 2024-02-22 RX ORDER — PROPARACAINE HYDROCHLORIDE 5 MG/ML
1 SOLUTION/ DROPS OPHTHALMIC
Status: DISCONTINUED | OUTPATIENT
Start: 2024-02-22 | End: 2024-02-22 | Stop reason: HOSPADM

## 2024-02-22 RX ORDER — ACETAMINOPHEN 325 MG/1
650 TABLET ORAL EVERY 4 HOURS PRN
Status: DISCONTINUED | OUTPATIENT
Start: 2024-02-22 | End: 2024-02-22 | Stop reason: HOSPADM

## 2024-02-22 RX ORDER — MIDAZOLAM HYDROCHLORIDE 1 MG/ML
INJECTION, SOLUTION INTRAMUSCULAR; INTRAVENOUS
Status: DISCONTINUED | OUTPATIENT
Start: 2024-02-22 | End: 2024-02-22

## 2024-02-22 RX ADMIN — MIDAZOLAM HYDROCHLORIDE 1 MG: 1 INJECTION, SOLUTION INTRAMUSCULAR; INTRAVENOUS at 12:02

## 2024-02-22 RX ADMIN — Medication 1 DROP: at 11:02

## 2024-02-22 RX ADMIN — MOXIFLOXACIN 1 DROP: 5 SOLUTION/ DROPS OPHTHALMIC at 01:02

## 2024-02-22 RX ADMIN — MOXIFLOXACIN OPHTHALMIC 1 DROP: 5 SOLUTION/ DROPS OPHTHALMIC at 11:02

## 2024-02-22 NOTE — ANESTHESIA POSTPROCEDURE EVALUATION
Anesthesia Post Evaluation    Patient: Marilee Mckee    Procedure(s) Performed: Procedure(s) (LRB):  EXTRACTION, CATARACT, WITH IOL INSERTION (Left)    Final Anesthesia Type: MAC      Patient location during evaluation: PACU  Patient participation: Yes- Able to Participate  Level of consciousness: awake and alert  Post-procedure vital signs: reviewed and stable  Pain management: adequate  Airway patency: patent    PONV status at discharge: No PONV  Anesthetic complications: no      Cardiovascular status: stable  Respiratory status: spontaneous ventilation  Hydration status: euvolemic  Follow-up not needed.              Vitals Value Taken Time   /60 02/22/24 1329   Temp 36.4 °C (97.5 °F) 02/22/24 1306   Pulse 80 02/22/24 1329   Resp 18 02/22/24 1329   SpO2 97 % 02/22/24 1329         No case tracking events are documented in the log.      Pain/Karie Score: Karie Score: 10 (2/22/2024  1:06 PM)

## 2024-02-22 NOTE — OP NOTE
SURGEON:  Gisel Shook M.D.    PREOPERATIVE DIAGNOSIS:    Nuclear Sclerotic Cataract Left Eye    POSTOPERATIVE DIAGNOSIS:    Nuclear Sclerotic Cataract Left Eye    PROCEDURES:    Phacoemulsification with  intraocular lens, Left eye (45001)    DATE OF SURGERY: 02/22/2024    IMPLANT: CNA0T0 10.0    ANESTHESIA:  MAC with topical Lidocaine    COMPLICATIONS:  None    ESTIMATED BLOOD LOSS: None    SPECIMENS: None    INDICATIONS:    The patient has a history of painless progressive visual loss and difficulty with activities of daily living, which specifically include difficult driving at night due to glare and difficulty reading small print, secondary to cataract formation.  After a thorough discussion of the risks, benefits, and alternatives to cataract surgery, including, but not limited to, the rare risks of infection, retinal detachment, hemorrhage, need for additional surgery, loss of vision, and even loss of the eye, the patient voices understanding and desires to proceed.    DESCRIPTION OF PROCEDURE:    The patients IOL calculations were reviewed, and the lens selection confirmed.   After verification and marking of the proper eye in the preop holding area, the patient was brought to the operating room in supine position where the eye was prepped and draped in standard sterile fashion with 5% Betadine and a lid speculum placed in the eye.   Topical 4% Lidocaine was used in addition to the preoperative anesthesia and the procedure was begun by the creation of a paracentesis incision through which viscoelastic was used to fill the anterior chamber.  Next, a keratome blade was used to create a triplanar temporal clear corneal incision and a cystotome and Utrata forceps used to fashion a continuous curvilinear capsulorrhexis.  Hydrodissection was carried out using the Thomas hydrodissection cannula and the nucleus was found to be mobile.  Phacoemulsification of the nucleus was carried out using a quick chop technique,  and all remaining epinuclear and cortical material was removed.  The eye was then reformed with Viscoelastic and the  intraocular lens was implanted into the capsular bag.  All remaining viscoelastics were removed from the eye and at the end of the case the pupil was round, the lens was well-centered within the capsular bag and all wounds were found to be water tight.  Drops of Vigamox and Pred Forte were instilled and a shield was placed over the eye. The patient will follow up with Dr. Shook in the morning.

## 2024-02-22 NOTE — DISCHARGE INSTRUCTIONS
CATARACT SURGERY    POST-OPERATIVE INSTRUCTIONS    · Apply drops THREE times a day into operative eye for 30 days.    · DO NOT rub your eye    · Wear protective sunglasses during the day    · Resume moderate activity    · Bathe/shower/wash face normally    · DO NOT apply makeup around the operative eye for 1 week.         You should expect    - Blurry vision and halos for 24-48 hours    - Dilated pupil for 24-48 hours    - Scratchy feeling in the eye for 1-2 days    - Curved shadow in your peripheral vision for 2-3 weeks    - Occasional flickering of lights for up to 1 week    -If you experience severe pain or nausea, please call Dr Shook or the on-call doctor at [phone]    - Plan to see Dr Shook tomorrow .      [facility]    [address] [facility] [address]    ** Most patients can drive the next day, but if you do not feel comfortable driving, please arrange for transportation.

## 2024-02-22 NOTE — TRANSFER OF CARE
"Anesthesia Transfer of Care Note    Patient: Marilee Mckee    Procedure(s) Performed: Procedure(s) (LRB):  EXTRACTION, CATARACT, WITH IOL INSERTION (Left)    Patient location: PACU    Anesthesia Type: MAC    Transport from OR: Transported from OR on room air with adequate spontaneous ventilation    Post pain: adequate analgesia    Post assessment: no apparent anesthetic complications and tolerated procedure well    Post vital signs: stable    Level of consciousness: awake, alert and oriented    Nausea/Vomiting: no nausea/vomiting    Complications: none    Transfer of care protocol was followed      Last vitals: Visit Vitals  /71 (BP Location: Right arm, Patient Position: Lying)   Pulse 66   Temp 36.6 °C (97.8 °F) (Tympanic)   Resp 18   Ht 5' 4" (1.626 m)   Wt 72.6 kg (160 lb)   LMP 04/21/2012   SpO2 100%   Breastfeeding No   BMI 27.46 kg/m²     "

## 2024-02-22 NOTE — DISCHARGE SUMMARY
Outcome: Successful outpatient ophthalmic surgical procedure  Preprinted Instructions given to patient.  Regular diet.  Activity: No restrictions  Meds: see Med Rec  Condition: stable  Follow up: 1 day with Dr Shook  Disposition: Home  Diagnosis: s/p eye surgery  Date of discharge: 02/22/2024

## 2024-02-22 NOTE — ANESTHESIA PREPROCEDURE EVALUATION
02/22/2024  Marilee Mckee is a 63 y.o., female.      Pre-op Assessment    I have reviewed the Patient Summary Reports.     I have reviewed the Nursing Notes.    I have reviewed the Medications.     Review of Systems  Anesthesia Hx:             Denies Family Hx of Anesthesia complications.    Denies Personal Hx of Anesthesia complications.                       Patient Active Problem List   Diagnosis    DJD (degenerative joint disease) of knee    Asthma    GERD (gastroesophageal reflux disease)    Environmental allergies    Dyslipidemia    Completed stroke    Familial tremor    History of skin cancer    Distal radius fracture, left    Left wrist pain    Decreased ROM of wrist    Impaired mobility and ADLs    Multiple thyroid nodules    Primary osteoarthritis of first carpometacarpal joint of right hand    Decreased strength of trunk and back    Impaired strength of hip muscles    Facet arthropathy, lumbar    Chronic right hip pain    Nuclear sclerotic cataract of right eye       Past Medical History:   Diagnosis Date    Abnormal Pap smear     Asthma     Cataract     DJD (degenerative joint disease) of knee     Fibrocystic breast     GERD (gastroesophageal reflux disease)     Stroke 2015    some memory loss and weakness in right arm       ECHO: No results found for this or any previous visit.      There is no height or weight on file to calculate BMI.    Tobacco Use: Medium Risk (2/22/2024)    Patient History     Smoking Tobacco Use: Former     Smokeless Tobacco Use: Never     Passive Exposure: Not on file       Social History     Substance and Sexual Activity   Drug Use No        Alcohol Use: Heavy Drinker (9/15/2023)    AUDIT-C     Frequency of Alcohol Consumption: 4 or more times a week     Average Number of Drinks: 1 or 2     Frequency of Binge Drinking: Less than monthly       Review of patient's  allergies indicates:   Allergen Reactions    Tetracyclines Dermatitis    Ceclor [cefaclor] Other (See Comments)     Canker sores    Keflex [cephalexin] Other (See Comments)     Canker sores    Cat dander     Coconut Hives    Shrimp Hives    Tree and shrub pollen          Airway:  No value filed.     Physical Exam  General: Well nourished    Airway:  Mallampati: II   Mouth Opening: Normal  TM Distance: Normal    Chest/Lungs:  Normal Respiratory Rate        Anesthesia Plan  Type of Anesthesia, risks & benefits discussed:    Anesthesia Type: MAC  Intra-op Monitoring Plan: Standard ASA Monitors  Induction:  IV  Informed Consent: Informed consent signed with the Patient and all parties understand the risks and agree with anesthesia plan.  All questions answered.   ASA Score: 2  Day of Surgery Review of History & Physical: H&P Update referred to the surgeon/provider.    Ready For Surgery From Anesthesia Perspective.     .

## 2024-02-23 ENCOUNTER — OFFICE VISIT (OUTPATIENT)
Dept: OPHTHALMOLOGY | Facility: CLINIC | Age: 64
End: 2024-02-23
Payer: COMMERCIAL

## 2024-02-23 DIAGNOSIS — Z98.890 POST-OPERATIVE STATE: Primary | ICD-10-CM

## 2024-02-23 DIAGNOSIS — H25.12 NUCLEAR SCLEROTIC CATARACT OF LEFT EYE: ICD-10-CM

## 2024-02-23 PROCEDURE — 99999 PR PBB SHADOW E&M-EST. PATIENT-LVL III: CPT | Mod: PBBFAC,,, | Performed by: OPHTHALMOLOGY

## 2024-02-23 PROCEDURE — 99024 POSTOP FOLLOW-UP VISIT: CPT | Mod: S$GLB,,, | Performed by: OPHTHALMOLOGY

## 2024-02-23 NOTE — PROGRESS NOTES
HPI    DATE OF SURGERY: 02/22/2024   IMPLANT: CNA0T0 10.0    Patient present today for 1 day Post Op OS  Pt state no complaints at this time   Denies f/f     PGB TID OS     Last edited by Valentina Daley on 2/23/2024  7:59 AM.            Assessment /Plan     For exam results, see Encounter Report.    Post-operative state    Nuclear sclerotic cataract of left eye      Slit lamp exam:  L/L: nl  K: clear, wound sealed  AC: 1+ cell  Lens: IOL centered and stable    POD1 s/p Phaco/IOL  Appropriate precautions and post op medications reviewed.  Patient instructed to call or come in if symptoms of redness, decreased vision, or pain are experienced.

## 2024-03-05 ENCOUNTER — OFFICE VISIT (OUTPATIENT)
Dept: OPTOMETRY | Facility: CLINIC | Age: 64
End: 2024-03-05
Payer: COMMERCIAL

## 2024-03-05 DIAGNOSIS — H52.203 MYOPIA OF BOTH EYES WITH ASTIGMATISM AND PRESBYOPIA: ICD-10-CM

## 2024-03-05 DIAGNOSIS — H52.13 MYOPIA OF BOTH EYES WITH ASTIGMATISM AND PRESBYOPIA: ICD-10-CM

## 2024-03-05 DIAGNOSIS — Z98.890 POST-OPERATIVE STATE: Primary | ICD-10-CM

## 2024-03-05 DIAGNOSIS — H52.4 MYOPIA OF BOTH EYES WITH ASTIGMATISM AND PRESBYOPIA: ICD-10-CM

## 2024-03-05 PROCEDURE — 99024 POSTOP FOLLOW-UP VISIT: CPT | Mod: S$GLB,,, | Performed by: OPTOMETRIST

## 2024-03-05 PROCEDURE — 99999 PR PBB SHADOW E&M-EST. PATIENT-LVL II: CPT | Mod: PBBFAC,,, | Performed by: OPTOMETRIST

## 2024-03-05 PROCEDURE — 92015 DETERMINE REFRACTIVE STATE: CPT | Mod: S$GLB,,, | Performed by: OPTOMETRIST

## 2024-03-05 NOTE — PROGRESS NOTES
HPI    1 mo PO   DLS- 02/23/2024 Dr. Shook     Pt sts intermediate vision is greatly improved vision is not in focus for   distance or near.   Denies any eye pain     PCIOL 02/01/2024 OD Dr. Shook   PCIOL 02/22/2024 OS Dr. Shook     EYEMEDS:  PGB TID OS   Refresh PRN   Last edited by Tomeka Caputo on 3/5/2024 10:58 AM.            Assessment /Plan     For exam results, see Encounter Report.    Post-operative state      Doing well post op cataract, New [employer] Rx given, discussed different options for glasses. RTC 1 year routine eye exam.

## 2024-04-09 DIAGNOSIS — Z00.00 ANNUAL PHYSICAL EXAM: Primary | ICD-10-CM

## 2024-04-09 RX ORDER — ATORVASTATIN CALCIUM 20 MG/1
20 TABLET, FILM COATED ORAL DAILY
Qty: 90 TABLET | Refills: 3 | OUTPATIENT
Start: 2024-04-09

## 2024-04-09 RX ORDER — ATORVASTATIN CALCIUM 20 MG/1
20 TABLET, FILM COATED ORAL DAILY
Qty: 90 TABLET | Refills: 3 | Status: SHIPPED | OUTPATIENT
Start: 2024-04-09

## 2024-04-09 NOTE — TELEPHONE ENCOUNTER
Refill Routing Note   Medication(s) are not appropriate for processing by [facility] for the following reason(s):        Non-participating provider    [facility] action(s):  Route               Appointments  past 12m or future 3m with PCP    Date Provider   Last Visit   6/20/2023 Tamy Vera, DO   Next Visit   Visit date not found Tamy Vera, DO   ED visits in past 90 days: 0        Note composed:2:16 AM 04/09/2024

## 2024-04-14 RX ORDER — MONTELUKAST SODIUM 10 MG/1
10 TABLET ORAL NIGHTLY
Qty: 90 TABLET | Refills: 0 | Status: SHIPPED | OUTPATIENT
Start: 2024-04-14

## 2024-04-14 RX ORDER — DESLORATADINE 5 MG/1
5 TABLET ORAL
Qty: 90 TABLET | Refills: 0 | Status: SHIPPED | OUTPATIENT
Start: 2024-04-14

## 2024-04-22 ENCOUNTER — PATIENT MESSAGE (OUTPATIENT)
Dept: OBSTETRICS AND GYNECOLOGY | Facility: CLINIC | Age: 64
End: 2024-04-22
Payer: COMMERCIAL

## 2024-04-24 ENCOUNTER — HOSPITAL ENCOUNTER (OUTPATIENT)
Dept: RADIOLOGY | Facility: HOSPITAL | Age: 64
Discharge: HOME | End: 2024-04-24
Attending: OBSTETRICS & GYNECOLOGY
Payer: COMMERCIAL

## 2024-04-24 VITALS — HEIGHT: 64 IN | BODY MASS INDEX: 27.31 KG/M2 | WEIGHT: 160 LBS

## 2024-04-24 DIAGNOSIS — Z12.31 SCREENING MAMMOGRAM, ENCOUNTER FOR: ICD-10-CM

## 2024-04-24 PROCEDURE — 77067 SCR MAMMO BI INCL CAD: CPT | Mod: 26,,, | Performed by: RADIOLOGY

## 2024-04-24 PROCEDURE — 77063 BREAST TOMOSYNTHESIS BI: CPT | Mod: 26,,, | Performed by: RADIOLOGY

## 2024-04-24 PROCEDURE — 77063 BREAST TOMOSYNTHESIS BI: CPT | Mod: TC

## 2024-05-16 ENCOUNTER — OFFICE VISIT (OUTPATIENT)
Dept: OBSTETRICS AND GYNECOLOGY | Facility: CLINIC | Age: 64
End: 2024-05-16
Payer: COMMERCIAL

## 2024-05-16 VITALS
DIASTOLIC BLOOD PRESSURE: 70 MMHG | SYSTOLIC BLOOD PRESSURE: 116 MMHG | BODY MASS INDEX: 28.24 KG/M2 | HEIGHT: 64 IN | WEIGHT: 165.38 LBS

## 2024-05-16 DIAGNOSIS — Z78.0 POSTMENOPAUSAL: ICD-10-CM

## 2024-05-16 DIAGNOSIS — Z01.419 ENCOUNTER FOR GYNECOLOGICAL EXAMINATION WITHOUT ABNORMAL FINDING: Primary | ICD-10-CM

## 2024-05-16 PROCEDURE — 99999 PR PBB SHADOW E&M-EST. PATIENT-LVL III: CPT | Mod: PBBFAC,,, | Performed by: OBSTETRICS & GYNECOLOGY

## 2024-05-16 PROCEDURE — 99396 PREV VISIT EST AGE 40-64: CPT | Mod: S$GLB,,, | Performed by: OBSTETRICS & GYNECOLOGY

## 2024-05-16 NOTE — PROGRESS NOTES
CC: Well woman exam    Marilee Mckee is a 63 y.o. female  presents for a well woman exam.  LMP: Patient's last menstrual period was 2012..  No issues, problems, or complaints.  She is copoing with the loss of her  after struggling with pancreatic cancer    Past Medical History:   Diagnosis Date    Abnormal Pap smear     Asthma     Cataract     DJD (degenerative joint disease) of knee     Fibrocystic breast     GERD (gastroesophageal reflux disease)     Stroke     some memory loss and weakness in right arm     Past Surgical History:   Procedure Laterality Date    BREAST BIOPSY Right     benign    BREAST BIOPSY Left     benign    BREAST SURGERY   and     right side and left side/begin fibroid tumor    CATARACT EXTRACTION W/  INTRAOCULAR LENS IMPLANT Right 2024    Procedure: EXTRACTION, CATARACT, WITH IOL INSERTION;  Surgeon: Gisel Shook MD;  Location: [facility] OR;  Service: Ophthalmology;  Laterality: Right;    CATARACT EXTRACTION W/  INTRAOCULAR LENS IMPLANT Left 2024    Procedure: EXTRACTION, CATARACT, WITH IOL INSERTION;  Surgeon: Gisel Shook MD;  Location: [facility] OR;  Service: Ophthalmology;  Laterality: Left;    CERVIX SURGERY      COLONOSCOPY N/A 2023    Procedure: COLONOSCOPY;  Surgeon: Niles Ruiz MD;  [facility]  Service: Endoscopy;  Laterality: N/A;  sutab, instructions portal- LW  23-precall completed. pt requesting to reschedule,  notified-    23 pre call complete -[facility]    colpo 2002      radial plate       skin cancers      TONSILLECTOMY      childhood    TONSILLECTOMY, ADENOIDECTOMY      childhood     Social History     Socioeconomic History    Marital status:    Occupational History    Occupation:    Tobacco Use    Smoking status: Former    Smokeless tobacco: Never    Tobacco comments:     quit >34 years ago   Substance and Sexual Activity    Alcohol use: Yes      Alcohol/week: 10.0 standard drinks of alcohol     Types: 10 Glasses of wine per week     Comment: has at least one drink daily/10-12 per week    Drug use: No    Sexual activity: Yes     Partners: Male     Birth control/protection: Post-menopausal   Social History Narrative    Retired from     Now working on novel writing and blogger     Social Determinants of Health     Financial Resource Strain: Low Risk  (3/2/2024)    Overall Financial Resource Strain (CARDIA)     Difficulty of Paying Living Expenses: Not hard at all   Food Insecurity: No Food Insecurity (3/2/2024)    Hunger Vital Sign     Worried About Running Out of Food in the Last Year: Never true     Ran Out of Food in the Last Year: Never true   Transportation Needs: No Transportation Needs (3/2/2024)    PRAPARE - Transportation     Lack of Transportation (Medical): No     Lack of Transportation (Non-Medical): No   Physical Activity: Sufficiently Active (3/2/2024)    Exercise Vital Sign     Days of Exercise per Week: 5 days     Minutes of Exercise per Session: 40 min   Stress: Stress Concern Present (3/2/2024)    [employer] - Occupational Stress Questionnaire     Feeling of Stress : To some extent   Housing Stability: Low Risk  (3/2/2024)    Housing Stability Vital Sign     Unable to Pay for Housing in the Last Year: No     Number of Places Lived in the Last Year: 1     Unstable Housing in the Last Year: No     Family History   Problem Relation Name Age of Onset    Heart disease Paternal Grandfather      Cancer Paternal Grandfather          bone cancer    Heart disease Paternal Grandmother      Heart disease Maternal Grandmother      Diabetes Maternal Grandmother      Heart disease Maternal Grandfather      Stroke Father      Dementia Father      Stroke Mother      Cataracts Mother      Dementia Mother      Glaucoma Sister      Strabismus Sister      Amblyopia Sister      Diabetes Maternal Aunt      Cancer  "Maternal Aunt          thyroid cancer    Melanoma Maternal Aunt      Cataracts Maternal Aunt      Heart disease Maternal Uncle      Cataracts Maternal Uncle      Diabetes Maternal Uncle      Cancer Paternal Aunt          stomach cancer    Diabetes Paternal Aunt      Heart disease Paternal Uncle      Cancer Paternal Uncle          prostate cancer    Diabetes Paternal Uncle      Breast cancer Neg Hx      Colon cancer Neg Hx      Ovarian cancer Neg Hx      Macular degeneration Neg Hx      Retinal detachment Neg Hx      Cervical cancer Neg Hx      Uterine cancer Neg Hx       OB History          1    Para   1    Term   0            AB        Living   1         SAB        IAB        Ectopic        Multiple        Live Births   1                 /70   Ht 5' 4" (1.626 m)   Wt 75 kg (165 lb 5.5 oz)   LMP 2012   BMI 28.38 kg/m²       ROS:    ROS:  GENERAL: Denies weight gain or weight loss. Feeling well overall.   SKIN: Denies rash or lesions.   HEAD: Denies head injury or headache.   NODES: Denies enlarged lymph nodes.   CHEST: Denies chest pain or shortness of breath.   CARDIOVASCULAR: Denies palpitations or left sided chest pain.   ABDOMEN: No abdominal pain, constipation, diarrhea, nausea, vomiting or rectal bleeding.   URINARY: No frequency, dysuria, hematuria, or burning on urination.  REPRODUCTIVE: See HPI.   BREASTS: The patient performs breast self-examination and denies pain, lumps, or nipple discharge.   HEMATOLOGIC: No easy bruisability or excessive bleeding.   MUSCULOSKELETAL: Denies joint pain or swelling.   NEUROLOGIC: Denies syncope or weakness.   PSYCHIATRIC: Denies depression, anxiety or mood swings.    PHYSICAL EXAM:    APPEARANCE: Well nourished, well developed, in no acute distress.  AFFECT: WNL, alert and oriented x 3  SKIN: No acne or hirsutism  NECK: Neck symmetric without masses or thyromegaly  NODES: No inguinal, cervical, axillary, or femoral lymph node " enlargement  CHEST: Good respiratory effect  ABDOMEN: Soft.  No tenderness or masses.  No hepatosplenomegaly.  No hernias.  BREASTS: Symmetrical, no skin changes or visible lesions.  No palpable masses, nipple discharge bilaterally.  PELVIC: Normal external genitalia without lesions.  Normal hair distribution.  Adequate perineal body, normal urethral meatus.  Vagina moist and well rugated without lesions or discharge.  Cervix pink, without lesions, discharge or tenderness.  No significant cystocele or rectocele.  Bimanual exam shows uterus to be normal size, regular, mobile and nontender.  Adnexa without masses or tenderness.    RECTAL: Rectovaginal exam confirms above with normal sphincter tone, no masses.  EXTREMITIES: No edema.    Diagnosis      ICD-10-CM ICD-9-CM    1. Encounter for gynecological examination without abnormal finding  Z01.419 V72.31       2. Postmenopausal  Z78.0 V49.81             Patient was counseled today on A.C.S. Pap guidelines and recommendations for yearly pelvic exams, mammograms and monthly self breast exams; to see her PCP for other health maintenance.     Follow up in about 1 year (around 5/16/2025), or if symptoms worsen or fail to improve.

## 2024-06-26 ENCOUNTER — LAB VISIT (OUTPATIENT)
Dept: LAB | Facility: HOSPITAL | Age: 64
End: 2024-06-26
Payer: COMMERCIAL

## 2024-06-26 DIAGNOSIS — Z00.00 ANNUAL PHYSICAL EXAM: ICD-10-CM

## 2024-06-26 LAB
ALBUMIN SERPL BCP-MCNC: 4 G/DL (ref 3.5–5.2)
ALP SERPL-CCNC: 65 U/L (ref 55–135)
ALT SERPL W/O P-5'-P-CCNC: 19 U/L (ref 10–44)
ANION GAP SERPL CALC-SCNC: 9 MMOL/L (ref 8–16)
AST SERPL-CCNC: 20 U/L (ref 10–40)
BASOPHILS # BLD AUTO: 0.03 K/UL (ref 0–0.2)
BASOPHILS NFR BLD: 0.5 % (ref 0–1.9)
BILIRUB SERPL-MCNC: 0.5 MG/DL (ref 0.1–1)
BUN SERPL-MCNC: 17 MG/DL (ref 8–23)
CALCIUM SERPL-MCNC: 9.3 MG/DL (ref 8.7–10.5)
CHLORIDE SERPL-SCNC: 108 MMOL/L (ref 95–110)
CHOLEST SERPL-MCNC: 158 MG/DL (ref 120–199)
CHOLEST/HDLC SERPL: 2.5 {RATIO} (ref 2–5)
CO2 SERPL-SCNC: 25 MMOL/L (ref 23–29)
CREAT SERPL-MCNC: 0.8 MG/DL (ref 0.5–1.4)
DIFFERENTIAL METHOD BLD: ABNORMAL
EOSINOPHIL # BLD AUTO: 0.1 K/UL (ref 0–0.5)
EOSINOPHIL NFR BLD: 2.3 % (ref 0–8)
ERYTHROCYTE [DISTWIDTH] IN BLOOD BY AUTOMATED COUNT: 12.6 % (ref 11.5–14.5)
EST. GFR  (NO RACE VARIABLE): >60 ML/MIN/1.73 M^2
ESTIMATED AVG GLUCOSE: 114 MG/DL (ref 68–131)
GLUCOSE SERPL-MCNC: 112 MG/DL (ref 70–110)
HBA1C MFR BLD: 5.6 % (ref 4–5.6)
HCT VFR BLD AUTO: 46 % (ref 37–48.5)
HDLC SERPL-MCNC: 64 MG/DL (ref 40–75)
HDLC SERPL: 40.5 % (ref 20–50)
HGB BLD-MCNC: 15.1 G/DL (ref 12–16)
IMM GRANULOCYTES # BLD AUTO: 0.01 K/UL (ref 0–0.04)
IMM GRANULOCYTES NFR BLD AUTO: 0.2 % (ref 0–0.5)
LDLC SERPL CALC-MCNC: 79.6 MG/DL (ref 63–159)
LYMPHOCYTES # BLD AUTO: 1.6 K/UL (ref 1–4.8)
LYMPHOCYTES NFR BLD: 29 % (ref 18–48)
MCH RBC QN AUTO: 32.1 PG (ref 27–31)
MCHC RBC AUTO-ENTMCNC: 32.8 G/DL (ref 32–36)
MCV RBC AUTO: 98 FL (ref 82–98)
MONOCYTES # BLD AUTO: 0.5 K/UL (ref 0.3–1)
MONOCYTES NFR BLD: 8.4 % (ref 4–15)
NEUTROPHILS # BLD AUTO: 3.4 K/UL (ref 1.8–7.7)
NEUTROPHILS NFR BLD: 59.6 % (ref 38–73)
NONHDLC SERPL-MCNC: 94 MG/DL
NRBC BLD-RTO: 0 /100 WBC
PLATELET # BLD AUTO: 269 K/UL (ref 150–450)
PMV BLD AUTO: 10.5 FL (ref 9.2–12.9)
POTASSIUM SERPL-SCNC: 4.1 MMOL/L (ref 3.5–5.1)
PROT SERPL-MCNC: 6.8 G/DL (ref 6–8.4)
RBC # BLD AUTO: 4.7 M/UL (ref 4–5.4)
SODIUM SERPL-SCNC: 142 MMOL/L (ref 136–145)
TRIGL SERPL-MCNC: 72 MG/DL (ref 30–150)
TSH SERPL DL<=0.005 MIU/L-ACNC: 1.87 UIU/ML (ref 0.4–4)
WBC # BLD AUTO: 5.62 K/UL (ref 3.9–12.7)

## 2024-06-26 PROCEDURE — 85025 COMPLETE CBC W/AUTO DIFF WBC: CPT | Performed by: INTERNAL MEDICINE

## 2024-06-26 PROCEDURE — 80061 LIPID PANEL: CPT | Performed by: INTERNAL MEDICINE

## 2024-06-26 PROCEDURE — 84443 ASSAY THYROID STIM HORMONE: CPT | Performed by: INTERNAL MEDICINE

## 2024-06-26 PROCEDURE — 36415 COLL VENOUS BLD VENIPUNCTURE: CPT | Mod: PO | Performed by: INTERNAL MEDICINE

## 2024-06-26 PROCEDURE — 83036 HEMOGLOBIN GLYCOSYLATED A1C: CPT | Performed by: INTERNAL MEDICINE

## 2024-06-26 PROCEDURE — 80053 COMPREHEN METABOLIC PANEL: CPT | Performed by: INTERNAL MEDICINE

## 2024-06-28 ENCOUNTER — OFFICE VISIT (OUTPATIENT)
Dept: INTERNAL MEDICINE | Facility: CLINIC | Age: 64
End: 2024-06-28
Payer: COMMERCIAL

## 2024-06-28 VITALS
HEIGHT: 64 IN | RESPIRATION RATE: 18 BRPM | TEMPERATURE: 98 F | WEIGHT: 166.56 LBS | SYSTOLIC BLOOD PRESSURE: 130 MMHG | BODY MASS INDEX: 28.44 KG/M2 | OXYGEN SATURATION: 98 % | HEART RATE: 67 BPM | DIASTOLIC BLOOD PRESSURE: 76 MMHG

## 2024-06-28 DIAGNOSIS — S33.5XXA LOW BACK SPRAIN, INITIAL ENCOUNTER: ICD-10-CM

## 2024-06-28 DIAGNOSIS — Z00.00 ANNUAL PHYSICAL EXAM: Primary | ICD-10-CM

## 2024-06-28 DIAGNOSIS — E04.1 THYROID NODULE: ICD-10-CM

## 2024-06-28 PROCEDURE — 99999 PR PBB SHADOW E&M-EST. PATIENT-LVL III: CPT | Mod: PBBFAC,,, | Performed by: INTERNAL MEDICINE

## 2024-06-28 PROCEDURE — 90471 IMMUNIZATION ADMIN: CPT | Mod: S$GLB,,, | Performed by: INTERNAL MEDICINE

## 2024-06-28 PROCEDURE — 99396 PREV VISIT EST AGE 40-64: CPT | Mod: 25,S$GLB,, | Performed by: INTERNAL MEDICINE

## 2024-06-28 PROCEDURE — 90677 PCV20 VACCINE IM: CPT | Mod: S$GLB,,, | Performed by: INTERNAL MEDICINE

## 2024-06-28 RX ORDER — METHOCARBAMOL 750 MG/1
750 TABLET, FILM COATED ORAL 3 TIMES DAILY PRN
Qty: 60 TABLET | Refills: 0 | Status: SHIPPED | OUTPATIENT
Start: 2024-06-28

## 2024-06-28 RX ORDER — METHYLPREDNISOLONE 4 MG/1
TABLET ORAL
Qty: 1 EACH | Refills: 0 | Status: SHIPPED | OUTPATIENT
Start: 2024-06-28

## 2024-06-28 NOTE — PROGRESS NOTES
Subjective     Patient ID: Marilee Mckee is a 64 y.o. female.    Chief Complaint: Annual Exam and Back Pain    HPI  64 y.o. Female here for annual exam.     Vaccines: Influenza (2023); Tetanus (2019); PNA (current); Shingrix (2021)  Eye exam: current  Mammogram: 4/24  Gyn exam: current  Colonoscopy: 9/23  DEXA: 5/23    Exercise: walks  Diet: regular    Past Medical History:  No date: Abnormal Pap smear  No date: Asthma  No date: Cataract  No date: DJD (degenerative joint disease) of knee  No date: Fibrocystic breast  No date: GERD (gastroesophageal reflux disease)  2015: Stroke      Comment:  some memory loss and weakness in right arm  Past Surgical History:  1990: BREAST BIOPSY; Right      Comment:  benign  2001: BREAST BIOPSY; Left      Comment:  benign  1990 and 2001: BREAST SURGERY      Comment:  right side and left side/begin fibroid tumor  2/1/2024: CATARACT EXTRACTION W/  INTRAOCULAR LENS IMPLANT; Right      Comment:  Procedure: EXTRACTION, CATARACT, WITH IOL INSERTION;                 Surgeon: Gisel Shook MD;  Location: [facility] OR;                 Service: Ophthalmology;  Laterality: Right;  2/22/2024: CATARACT EXTRACTION W/  INTRAOCULAR LENS IMPLANT; Left      Comment:  Procedure: EXTRACTION, CATARACT, WITH IOL INSERTION;                 Surgeon: Gisel Shook MD;  Location: [facility] at [facility] OR;                 Service: Ophthalmology;  Laterality: Left;  No date: CERVIX SURGERY  9/12/2023: COLONOSCOPY; N/A      Comment:  Procedure: COLONOSCOPY;  Surgeon: Niles Ruiz MD;  Location: [address]);  Service: Endoscopy;                Laterality: N/A;  sutab, instructions portal-                2/1/23-precall completed. pt requesting to                reschedule,  notified-MH9/5/23 pre call                complete -egh  No date: colpo 2002  No date: radial plate   No date: skin cancers  No date: TONSILLECTOMY      Comment:  childhood  No date: TONSILLECTOMY, ADENOIDECTOMY       Comment:  childhood  Social History    Socioeconomic History      Marital status:     Occupational History      Occupation:     Tobacco Use      Smoking status: Former      Smokeless tobacco: Never      Tobacco comments: quit >34 years ago    Substance and Sexual Activity      Alcohol use: Yes        Alcohol/week: 10.0 standard drinks of alcohol        Types: 10 Glasses of wine per week        Comment: has at least one drink daily/10-12 per week      Drug use: No      Sexual activity: Yes        Partners: Male        Birth control/protection: Post-menopausal    Social History Narrative      Retired from       Now working on novel writing and blogger    Social Determinants of Health  Financial Resource Strain: Low Risk  (6/24/2024)      Overall Financial Resource Strain (CARDIA)          Difficulty of Paying Living Expenses: Not hard at all  Food Insecurity: No Food Insecurity (6/24/2024)      Hunger Vital Sign          Worried About Running Out of Food in the Last Year: Never true          Ran Out of Food in the Last Year: Never true  Transportation Needs: No Transportation Needs (3/2/2024)      PRAPARE - Transportation          Lack of Transportation (Medical): No          Lack of Transportation (Non-Medical): No  Physical Activity: Sufficiently Active (6/24/2024)      Exercise Vital Sign          Days of Exercise per Week: 5 days          Minutes of Exercise per Session: 50 min  Stress: Stress Concern Present (6/24/2024)      [employer] - Occupational Stress Questionnaire          Feeling of Stress : To some extent  Housing Stability: Low Risk  (3/2/2024)      Housing Stability Vital Sign          Unable to Pay for Housing in the Last Year: No          Number of Places Lived in the Last Year: 1          Unstable Housing in the Last Year: No  Review of patient's allergies indicates:   -- Tetracyclines -- Dermatitis   -- Cefaclor -- Other  (See Comments)    --  Canker sores   -- Cephalexin -- Other (See Comments)    --  Canker sores   -- Cat dander -- Other (See Comments)   -- Coconut -- Hives and Other (See Comments)   -- Shrimp -- Hives and Other (See Comments)   -- Tetracycline -- Other (See Comments)   -- Tree and shrub pollen -- Other (See Comments)  Marilee MJenny Mckee had no medications administered during this visit.  Review of Systems   Constitutional:  Negative for activity change, appetite change, chills, diaphoresis, fatigue, fever and unexpected weight change.   HENT:  Negative for nasal congestion, mouth sores, postnasal drip, rhinorrhea, sinus pressure/congestion, sneezing, sore throat, trouble swallowing and voice change.    Eyes:  Negative for pain, discharge and visual disturbance.   Respiratory:  Negative for cough, shortness of breath and wheezing.    Cardiovascular:  Negative for chest pain, palpitations and leg swelling.   Gastrointestinal:  Negative for abdominal pain, blood in stool, constipation, diarrhea, nausea and vomiting.   Endocrine: Negative for cold intolerance and heat intolerance.   Genitourinary:  Negative for difficulty urinating, dysuria, frequency, hematuria and urgency.   Musculoskeletal:  Positive for back pain. Negative for arthralgias and myalgias.   Integumentary:  Negative for rash and wound.   Allergic/Immunologic: Negative for environmental allergies and food allergies.   Neurological:  Negative for dizziness, tremors, seizures, syncope, weakness, light-headedness and headaches.   Hematological:  Negative for adenopathy. Does not bruise/bleed easily.   Psychiatric/Behavioral:  Negative for confusion and sleep disturbance. The patient is not nervous/anxious.           Objective     Physical Exam  Vitals and nursing note reviewed.   Constitutional:       General: She is not in acute distress.     Appearance: Normal appearance. She is well-developed. She is not diaphoretic.   HENT:      Head: Normocephalic  and atraumatic.      Right Ear: External ear normal.      Left Ear: External ear normal.      Nose: Nose normal.      Mouth/Throat:      Pharynx: No oropharyngeal exudate.   Eyes:      General: No scleral icterus.        Right eye: No discharge.         Left eye: No discharge.      Conjunctiva/sclera: Conjunctivae normal.      Pupils: Pupils are equal, round, and reactive to light.   Neck:      Thyroid: No thyromegaly.      Vascular: No JVD.   Cardiovascular:      Rate and Rhythm: Normal rate and regular rhythm.      Pulses: Normal pulses.      Heart sounds: Normal heart sounds. No murmur heard.  Pulmonary:      Effort: Pulmonary effort is normal. No respiratory distress.      Breath sounds: Normal breath sounds. No wheezing, rhonchi or rales.   Chest:      Chest wall: No tenderness.   Abdominal:      General: Bowel sounds are normal. There is no distension.      Palpations: Abdomen is soft.      Tenderness: There is no abdominal tenderness. There is no guarding or rebound.   Musculoskeletal:      Cervical back: Neck supple.      Lumbar back: Tenderness present. No bony tenderness.      Right lower leg: No edema.      Left lower leg: No edema.   Lymphadenopathy:      Cervical: No cervical adenopathy.   Skin:     General: Skin is warm and dry.      Coloration: Skin is not pale.      Findings: No rash.   Neurological:      General: No focal deficit present.      Mental Status: She is alert and oriented to person, place, and time.      Gait: Gait normal.   Psychiatric:         Behavior: Behavior normal.         Thought Content: Thought content normal.         Judgment: Judgment normal.            Assessment and Plan     1. Annual physical exam  -     pneumoc 20-adonay conj-dip cr(PF) (PREVNAR-20 (PF)) injection Syrg 0.5 mL    2. Thyroid nodule  -     US Soft Tissue Head Neck; Future; Expected date: 06/28/2024    3. Low back sprain, initial encounter  -     methylPREDNISolone (MEDROL, CRYSTAL,) 4 mg tablet; use as directed   Dispense: 1 each; Refill: 0  -     methocarbamoL (ROBAXIN) 750 MG Tab; Take 1 tablet (750 mg total) by mouth 3 (three) times daily as needed (back spasms).  Dispense: 60 tablet; Refill: 0        Blood work reviewed with pt    HLD- stable on statin    Low back sprain- Rx Medrol pack and Robaxin PRN    Thyroid nodules- hx of neg Bx   -repeat US    F/u in 1 yr

## 2024-07-05 ENCOUNTER — HOSPITAL ENCOUNTER (OUTPATIENT)
Dept: RADIOLOGY | Facility: HOSPITAL | Age: 64
Discharge: HOME | End: 2024-07-05
Attending: INTERNAL MEDICINE
Payer: COMMERCIAL

## 2024-07-05 DIAGNOSIS — E04.1 THYROID NODULE: ICD-10-CM

## 2024-07-05 PROCEDURE — 76536 US EXAM OF HEAD AND NECK: CPT | Mod: 26,,, | Performed by: RADIOLOGY

## 2024-07-05 PROCEDURE — 76536 US EXAM OF HEAD AND NECK: CPT | Mod: TC

## 2024-07-05 RX ORDER — DESLORATADINE 5 MG/1
5 TABLET ORAL DAILY
Qty: 90 TABLET | Refills: 3 | Status: SHIPPED | OUTPATIENT
Start: 2024-07-05

## 2024-07-05 RX ORDER — MONTELUKAST SODIUM 10 MG/1
10 TABLET ORAL NIGHTLY
Qty: 90 TABLET | Refills: 3 | Status: SHIPPED | OUTPATIENT
Start: 2024-07-05

## 2024-07-05 NOTE — TELEPHONE ENCOUNTER
No care due was identified.  [facility] Embedded Care Due Messages. Reference number: [id number].   7/05/2024 11:33:00 AM CDT

## 2024-07-05 NOTE — TELEPHONE ENCOUNTER
Refill Routing Note   Medication(s) are not appropriate for processing by [facility] for the following reason(s):        No active prescription written by provider: not yet signed by current PCP    [facility] action(s):  Defer      Medication Therapy Plan:         Appointments  past 12m or future 3m with PCP    Date Provider   Last Visit   6/28/2024 Willi Estevez, DO   Next Visit   Visit date not found Willi Estevez, DO   ED visits in past 90 days: 0        Note composed:11:33 AM 07/05/2024

## 2024-10-01 ENCOUNTER — PATIENT MESSAGE (OUTPATIENT)
Dept: INTERNAL MEDICINE | Facility: CLINIC | Age: 64
End: 2024-10-01
Payer: COMMERCIAL

## 2024-10-24 ENCOUNTER — OFFICE VISIT (OUTPATIENT)
Dept: INTERNAL MEDICINE | Facility: CLINIC | Age: 64
End: 2024-10-24
Payer: COMMERCIAL

## 2024-10-24 ENCOUNTER — PATIENT MESSAGE (OUTPATIENT)
Dept: INTERNAL MEDICINE | Facility: CLINIC | Age: 64
End: 2024-10-24

## 2024-10-24 VITALS
HEART RATE: 72 BPM | SYSTOLIC BLOOD PRESSURE: 114 MMHG | OXYGEN SATURATION: 98 % | WEIGHT: 169.88 LBS | BODY MASS INDEX: 29 KG/M2 | DIASTOLIC BLOOD PRESSURE: 64 MMHG | HEIGHT: 64 IN | TEMPERATURE: 98 F

## 2024-10-24 DIAGNOSIS — K21.9 GASTROESOPHAGEAL REFLUX DISEASE WITHOUT ESOPHAGITIS: ICD-10-CM

## 2024-10-24 DIAGNOSIS — R05.9 COUGH, UNSPECIFIED TYPE: ICD-10-CM

## 2024-10-24 DIAGNOSIS — J45.909 ASTHMA, UNSPECIFIED ASTHMA SEVERITY, UNSPECIFIED WHETHER COMPLICATED, UNSPECIFIED WHETHER PERSISTENT: Chronic | ICD-10-CM

## 2024-10-24 DIAGNOSIS — J32.9 SINUSITIS, UNSPECIFIED CHRONICITY, UNSPECIFIED LOCATION: Primary | ICD-10-CM

## 2024-10-24 DIAGNOSIS — Z91.09 ENVIRONMENTAL ALLERGIES: Chronic | ICD-10-CM

## 2024-10-24 PROCEDURE — 99999 PR PBB SHADOW E&M-EST. PATIENT-LVL IV: CPT | Mod: PBBFAC,,,

## 2024-10-24 RX ORDER — PROMETHAZINE HYDROCHLORIDE AND DEXTROMETHORPHAN HYDROBROMIDE 6.25; 15 MG/5ML; MG/5ML
5 SYRUP ORAL NIGHTLY PRN
Qty: 50 ML | Refills: 0 | Status: SHIPPED | OUTPATIENT
Start: 2024-10-24 | End: 2024-11-03

## 2024-10-24 RX ORDER — ALBUTEROL SULFATE 90 UG/1
INHALANT RESPIRATORY (INHALATION)
Qty: 18 G | Refills: 11 | Status: SHIPPED | OUTPATIENT
Start: 2024-10-24

## 2024-10-24 RX ORDER — BENZONATATE 100 MG/1
100 CAPSULE ORAL 3 TIMES DAILY PRN
Qty: 30 CAPSULE | Refills: 0 | Status: SHIPPED | OUTPATIENT
Start: 2024-10-24 | End: 2024-11-03

## 2024-10-24 NOTE — PROGRESS NOTES
"Subjective:       Patient ID: Marilee Clark is a 64 y.o. female.    Chief Complaint: Cough and Nasal Congestion    Cough      History of Present Illness    CHIEF COMPLAINT:  Ms. Clark presents today for cough and nasal congestion.    HISTORY OF PRESENT ILLNESS:  She reports symptoms starting two weeks ago, including persistent cough, nasal congestion, runny nose, and significant postnasal drip. She denies fever, nausea, or vomiting, but reports coughing hard enough to induce gagging. She expresses concern about sleep disturbance due to coughing. She mentions this is her "bad time of year" for allergies, suggesting seasonal exacerbation of symptoms.    ALLERGIES:  She reports allergies to tetracyclines, cefaclor, cephalexin, cat dander, coconut shrimp, tree and shrub pollen. She mentions being tested for 41 allergens and reacting to 38 of them, indicating a broad spectrum of allergies.    MEDICATIONS:  She takes Zyrtec and Claritin daily (one in the morning, one at night), Singulair at night, famotidine for reflux-triggered asthma, and Mucinex twice daily. She reports her albuterol inhaler has . She takes aspirin 81mg, reduced from a full dose to help diminish the severity of nosebleeds following a stroke in 2015.    MEDICAL HISTORY:  She has a history of asthma (with reflux as a trigger), stroke in 2015, chronic sinus problems, and elevated intraocular pressure. She is unable to use Flonase due to elevated intraocular pressure and reports chronic nosebleeds with Astelin use. She denies tolerating any nasal sprays.    LIFESTYLE:  She trains for half marathons, using a treadmill due to poor outdoor air quality.      ROS:  Constitutional: -fevers  ENT: +nasal congestion, +post nasal drip  Respiratory: +cough  Gastrointestinal: -nausea, -vomiting         Review of Systems   Respiratory:  Positive for cough.              Objective:      Physical Exam  Constitutional:       General: She is not in acute " distress.     Appearance: Normal appearance. She is not ill-appearing, toxic-appearing or diaphoretic.   HENT:      Head: Normocephalic and atraumatic.      Right Ear: Tympanic membrane, ear canal and external ear normal.      Left Ear: Tympanic membrane, ear canal and external ear normal.      Nose: Congestion and rhinorrhea present.      Right Turbinates: Swollen and pale.      Left Turbinates: Swollen and pale.      Right Sinus: No maxillary sinus tenderness or frontal sinus tenderness.      Left Sinus: No maxillary sinus tenderness or frontal sinus tenderness.      Mouth/Throat:      Pharynx: Postnasal drip present. No pharyngeal swelling, oropharyngeal exudate or posterior oropharyngeal erythema.   Eyes:      General: No scleral icterus.     Conjunctiva/sclera: Conjunctivae normal.      Pupils: Pupils are equal, round, and reactive to light.   Cardiovascular:      Rate and Rhythm: Normal rate and regular rhythm.      Pulses: Normal pulses.      Heart sounds: Normal heart sounds. No murmur heard.     No friction rub. No gallop.   Pulmonary:      Effort: Pulmonary effort is normal. No respiratory distress.      Breath sounds: Normal breath sounds. No wheezing, rhonchi or rales.   Abdominal:      General: There is no distension.      Palpations: Abdomen is soft. There is no mass.      Tenderness: There is no abdominal tenderness. There is no guarding.   Musculoskeletal:         General: Normal range of motion.      Cervical back: Normal range of motion and neck supple.   Lymphadenopathy:      Cervical: No cervical adenopathy.   Skin:     General: Skin is dry.      Capillary Refill: Capillary refill takes less than 2 seconds.   Neurological:      Mental Status: She is alert and oriented to person, place, and time. Mental status is at baseline.      GCS: GCS eye subscore is 4. GCS verbal subscore is 5. GCS motor subscore is 6.   Psychiatric:         Behavior: Behavior normal.           Physical Exam             Assessment:       1. Sinusitis, unspecified chronicity, unspecified location  - promethazine-dextromethorphan (PROMETHAZINE-DM) 6.25-15 mg/5 mL Syrp; Take 5 mLs by mouth nightly as needed (Nighttime cough suppression).  Dispense: 50 mL; Refill: 0  - benzonatate (TESSALON PERLES) 100 MG capsule; Take 1 capsule (100 mg total) by mouth 3 (three) times daily as needed for Cough.  Dispense: 30 capsule; Refill: 0    2. Asthma, unspecified asthma severity, unspecified whether complicated, unspecified whether persistent  - albuterol (PROVENTIL/VENTOLIN HFA) 90 mcg/actuation inhaler; albuterol sulfate HFA 90 mcg/actuation aerosol inhaler  Dispense: 18 g; Refill: 11    3. Gastroesophageal reflux disease without esophagitis    4. Environmental allergies    5. Cough, unspecified type  - promethazine-dextromethorphan (PROMETHAZINE-DM) 6.25-15 mg/5 mL Syrp; Take 5 mLs by mouth nightly as needed (Nighttime cough suppression).  Dispense: 50 mL; Refill: 0  - benzonatate (TESSALON PERLES) 100 MG capsule; Take 1 capsule (100 mg total) by mouth 3 (three) times daily as needed for Cough.  Dispense: 30 capsule; Refill: 0      Plan:     Assessment & Plan    SINUSITIS:  - Provided information on sinusitis and supportive care measures for review at home.  - Emphasized the importance of consistent medication use, particularly Mucinex, for symptom management.  - Ms. Amber to use cool mist humidifier in room at night.  - Ms. Amber to conduct nasal saline rinses.  - Continued Mucinex 1200 mg twice daily.    COUGH:  - Started promethazine DM for nighttime cough suppression (take before bed due to drowsiness).  - Started Tessalon Pearls for daytime cough suppression.    ALLERGIES:  - Continued Zyrtec and Claritin (1 in the morning, 1 at night).  - Continued Singulair at night.    GASTROESOPHAGEAL REFLUX DISEASE (GERD):  - Continued famotidine.    ASTHMA:  - Refilled albuterol.    SUPPORTIVE CARE:  - Discussed the relationship between sleep,  immune function, and recovery.  - Ms. Amber to drink hot tea with honey.  - Ms. Amber to perform warm salt water gargles.    FOLLOW UP:  - Follow up if symptoms worsen, develop fever, or other concerns arise.          This note was generated with the assistance of ambient listening technology. Verbal consent was obtained by the patient and accompanying visitor(s) for the recording of patient appointment to facilitate this note. I attest to having reviewed and edited the generated note for accuracy, though some syntax or spelling errors may persist. Please contact the author of this note for any clarification.

## 2024-10-24 NOTE — PATIENT INSTRUCTIONS
Supportive care as discussed:  -Drink plenty of fluids  -Rest as much as possible  -Can use a humidifier or can take a steamy shower  -You can also use saline nose drops/wash to relieve stuffiness.  -Use nasal spray as prescribed  -Use OTC allergy medication daily (Xyzal, Claritin, Zyrtec, etc)  -Use OTC lozenges for sore throat as needed  -Tylenol 325 to 650 mg every 4 to 6 hours as needed or 1 g every 6 hours as needed for pain and/or fever; maximum dose: 4 g/day   -Ibuprofen 200 to 400 mg every 4 to 6 hours as needed or 600 to 800 mg every 6 to 8 hours as needed for pain and/or fever; maximum dose: 3.2 g/day   -If you decide to take over-the-counter cough or cold medicines, follow the directions on the label carefully. Be sure you do not take more than 1 medicine that contains acetaminophen.   -Practice good hand hygiene to reduce the spread of infection  -Return to clinic or follow up with you PCP if symptoms worsen or do not improve with treatment

## 2024-10-29 ENCOUNTER — PATIENT MESSAGE (OUTPATIENT)
Dept: INTERNAL MEDICINE | Facility: CLINIC | Age: 64
End: 2024-10-29
Payer: COMMERCIAL

## 2025-02-24 ENCOUNTER — OFFICE VISIT (OUTPATIENT)
Dept: INTERNAL MEDICINE | Facility: CLINIC | Age: 65
End: 2025-02-24
Payer: COMMERCIAL

## 2025-02-24 ENCOUNTER — LAB VISIT (OUTPATIENT)
Dept: LAB | Facility: HOSPITAL | Age: 65
End: 2025-02-24
Payer: COMMERCIAL

## 2025-02-24 VITALS
DIASTOLIC BLOOD PRESSURE: 82 MMHG | WEIGHT: 165.38 LBS | SYSTOLIC BLOOD PRESSURE: 124 MMHG | TEMPERATURE: 98 F | HEART RATE: 76 BPM | BODY MASS INDEX: 28.24 KG/M2 | OXYGEN SATURATION: 97 % | HEIGHT: 64 IN

## 2025-02-24 DIAGNOSIS — R07.9 CHEST PAIN, UNSPECIFIED TYPE: ICD-10-CM

## 2025-02-24 DIAGNOSIS — R07.9 ACUTE CHEST PAIN: ICD-10-CM

## 2025-02-24 DIAGNOSIS — R05.1 ACUTE COUGH: ICD-10-CM

## 2025-02-24 DIAGNOSIS — M79.10 MYALGIA: ICD-10-CM

## 2025-02-24 DIAGNOSIS — R09.82 POST-NASAL DRIP: ICD-10-CM

## 2025-02-24 DIAGNOSIS — R06.02 SHORTNESS OF BREATH: ICD-10-CM

## 2025-02-24 DIAGNOSIS — R50.9 FEVER, UNSPECIFIED FEVER CAUSE: ICD-10-CM

## 2025-02-24 DIAGNOSIS — J06.9 UPPER RESPIRATORY TRACT INFECTION, UNSPECIFIED TYPE: Primary | ICD-10-CM

## 2025-02-24 PROCEDURE — 99214 OFFICE O/P EST MOD 30 MIN: CPT | Mod: S$GLB,,, | Performed by: NURSE PRACTITIONER

## 2025-02-24 PROCEDURE — 80048 BASIC METABOLIC PNL TOTAL CA: CPT | Performed by: NURSE PRACTITIONER

## 2025-02-24 PROCEDURE — 99999 PR PBB SHADOW E&M-EST. PATIENT-LVL IV: CPT | Mod: PBBFAC,,, | Performed by: NURSE PRACTITIONER

## 2025-02-24 PROCEDURE — 36415 COLL VENOUS BLD VENIPUNCTURE: CPT | Mod: PO | Performed by: NURSE PRACTITIONER

## 2025-02-24 RX ORDER — OSELTAMIVIR PHOSPHATE 75 MG/1
75 CAPSULE ORAL 2 TIMES DAILY
Qty: 10 CAPSULE | Refills: 0 | Status: SHIPPED | OUTPATIENT
Start: 2025-02-24 | End: 2025-03-01

## 2025-02-24 NOTE — PROGRESS NOTES
Subjective:       Patient ID: Marilee Mckee is a 64 y.o. female.    Chief Complaint: Chills, Headache, Cough, Fatigue, and Fever    History of Present Illness    CHIEF COMPLAINT:  Patient presents today with body aches and cough.    HISTORY OF PRESENT ILLNESS:  She reports symptoms began on April 21st with a productive deep cough with thick yellow sputum. Chest pain and shortness of breath developed the following day. She notes popping and crackling sounds when lying down and nocturnal coughing. She recently returned from a [facility] to [place] on April 20th after 22 hours of travel. Though exhausted upon return, she denies feeling ill initially. She reports improvement with Advil dual action for body aches and fever, and Delsym for cough.     MEDICAL HISTORY:  She has a history of bronchitis and elevated intraocular pressure.    MEDICATIONS:  She continues her regular allergy and asthma medications. She uses albuterol inhaler as needed but reports minimal recent usage.    ALLERGIES:  She reports allergies to iodine and reports history of nosebleeds with Astelin use.    ROS:  Constitutional: +fevers  Respiratory: +shortness of breath, +cough  Cardiovascular: +chest pain     Objective:      Physical Exam  Vitals reviewed.   Constitutional:       Appearance: Normal appearance.   HENT:      Head: Normocephalic and atraumatic.      Nose: Congestion present.      Mouth/Throat:      Pharynx: Posterior oropharyngeal erythema present.   Eyes:      Conjunctiva/sclera: Conjunctivae normal.      Pupils: Pupils are equal, round, and reactive to light.   Cardiovascular:      Rate and Rhythm: Normal rate and regular rhythm.   Pulmonary:      Effort: Pulmonary effort is normal.      Breath sounds: Normal breath sounds.   Abdominal:      General: Bowel sounds are normal.      Palpations: Abdomen is soft.   Musculoskeletal:         General: Normal range of motion.      Cervical back: Normal range of motion and neck supple.    Skin:     General: Skin is warm.   Neurological:      General: No focal deficit present.   Psychiatric:         Mood and Affect: Mood normal.         Assessment:       1. Upper respiratory tract infection, unspecified type  - POCT Influenza A/B Molecular  - SARS Coronavirus 2 Antigen, POCT Manual Read    2. Myalgia    3. Acute cough    4. Post-nasal drip    5. Fever, unspecified fever cause    6. Chest pain, unspecified type    7. Acute chest pain  - CTA Chest Non-Coronary (PE Studies); Future    8. Shortness of breath  - Basic Metabolic Panel; Future      Plan:       Assessment & Plan    IMPRESSION:  Positive flu test explains patient's symptoms  Ordered CT to rule out pulmonary embolism due to recent transatlantic flight, chest pain, and shortness of breath  Requested kidney function test prior to CT    INFLUENZA:  - Performed flu swab test, which returned positive results.  - Diagnosed the patient with influenza, acknowledging its current prevalence compared to COVID-19.  - Prescribed Tamiflu for flu treatment.  - Advised continuation of Advil Dual Action for body aches and fever management.  - Recommend continued use of Delsym for cough suppression.    SUSPECTED PULMONARY EMBOLISM:  - Ordered CT of chest to rule out pulmonary embolism.  - Assessed risk factors for pulmonary embolism, including recent transatlantic flight and sedentary behavior.  - Noted patient's reports of chest pain and dyspnea since Saturday, following long-distance travel.    KIDNEY FUNCTION:  - Requested kidney function blood test.    ASTHMA:  - Advised continuation of albuterol inhaler use as needed.  - Noted patient's report of regular asthma medication use and albuterol inhaler use as needed.  - Documented patient's experience of popping and crackling sounds in chest at night.  - Inquired about frequency of albuterol inhaler use.    ELEVATED INTRAOCULAR PRESSURE:  - Noted patient's report of elevated intraocular pressure.  - Avoided  prescribing Flonase due to patient's elevated intraocular pressure.    ALLERGIES:  - Recommend continuation of saline nasal irrigation.  - Advised continued use of regular allergy medications, including Montelukast and Clarinex twice daily.  - Noted patient's report of using regular allergy medications.    FOLLOW UP:  - Use patient portal for communication.       This note was generated with the assistance of ambient listening technology. Verbal consent was obtained by the patient and accompanying visitor(s) for the recording of patient appointment to facilitate this note. I attest to having reviewed and edited the generated note for accuracy, though some syntax or spelling errors may persist. Please contact the author of this note for any clarification.       Answers submitted by the patient for this visit:  Cough Questionnaire (Submitted on 2/23/2025)  Chief Complaint: Cough  Chronicity: new  Onset: in the past 7 days  Progression since onset: gradually worsening  Frequency: every few minutes  Cough characteristics: productive of purulent sputum  chest pain: Yes  chills: Yes  fever: Yes  heartburn: Yes  myalgias: Yes  sore throat: Yes  wheezing: Yes  Risk factors for lung disease: travel  asthma: Yes  bronchitis: Yes  environmental allergies: Yes  Treatments tried: OTC cough suppressant, leukotriene antagonists  Improvement on treatment: mild

## 2025-02-25 LAB
ANION GAP SERPL CALC-SCNC: 9 MMOL/L (ref 8–16)
BUN SERPL-MCNC: 10 MG/DL (ref 8–23)
CALCIUM SERPL-MCNC: 9.2 MG/DL (ref 8.7–10.5)
CHLORIDE SERPL-SCNC: 109 MMOL/L (ref 95–110)
CO2 SERPL-SCNC: 23 MMOL/L (ref 23–29)
CREAT SERPL-MCNC: 0.7 MG/DL (ref 0.5–1.4)
EST. GFR  (NO RACE VARIABLE): >60 ML/MIN/1.73 M^2
GLUCOSE SERPL-MCNC: 88 MG/DL (ref 70–110)
POTASSIUM SERPL-SCNC: 4.1 MMOL/L (ref 3.5–5.1)
SODIUM SERPL-SCNC: 141 MMOL/L (ref 136–145)

## 2025-02-26 ENCOUNTER — RESULTS FOLLOW-UP (OUTPATIENT)
Dept: INTERNAL MEDICINE | Facility: CLINIC | Age: 65
End: 2025-02-26

## 2025-02-27 ENCOUNTER — HOSPITAL ENCOUNTER (OUTPATIENT)
Dept: RADIOLOGY | Facility: HOSPITAL | Age: 65
Discharge: HOME | End: 2025-02-27
Attending: NURSE PRACTITIONER
Payer: COMMERCIAL

## 2025-02-27 DIAGNOSIS — R07.9 ACUTE CHEST PAIN: ICD-10-CM

## 2025-02-27 PROCEDURE — 71275 CT ANGIOGRAPHY CHEST: CPT | Mod: TC

## 2025-02-27 PROCEDURE — 71275 CT ANGIOGRAPHY CHEST: CPT | Mod: 26,,, | Performed by: RADIOLOGY

## 2025-02-27 PROCEDURE — 25500020 PHARM REV CODE 255: Performed by: NURSE PRACTITIONER

## 2025-02-27 RX ADMIN — IOHEXOL 75 ML: 350 INJECTION, SOLUTION INTRAVENOUS at 02:02

## 2025-03-24 RX ORDER — ATORVASTATIN CALCIUM 20 MG/1
20 TABLET, FILM COATED ORAL
Qty: 90 TABLET | Refills: 3 | Status: SHIPPED | OUTPATIENT
Start: 2025-03-24

## 2025-04-09 ENCOUNTER — PATIENT MESSAGE (OUTPATIENT)
Dept: OBSTETRICS AND GYNECOLOGY | Facility: CLINIC | Age: 65
End: 2025-04-09
Payer: COMMERCIAL

## 2025-04-09 DIAGNOSIS — Z12.31 BREAST CANCER SCREENING BY MAMMOGRAM: Primary | ICD-10-CM

## 2025-04-25 ENCOUNTER — HOSPITAL ENCOUNTER (OUTPATIENT)
Dept: RADIOLOGY | Facility: HOSPITAL | Age: 65
Discharge: HOME | End: 2025-04-25
Attending: OBSTETRICS & GYNECOLOGY
Payer: COMMERCIAL

## 2025-04-25 VITALS — BODY MASS INDEX: 28 KG/M2 | HEIGHT: 64 IN | WEIGHT: 164 LBS

## 2025-04-25 DIAGNOSIS — Z12.31 BREAST CANCER SCREENING BY MAMMOGRAM: ICD-10-CM

## 2025-04-25 PROCEDURE — 77063 BREAST TOMOSYNTHESIS BI: CPT | Mod: 26,,, | Performed by: RADIOLOGY

## 2025-04-25 PROCEDURE — 77067 SCR MAMMO BI INCL CAD: CPT | Mod: TC

## 2025-04-25 PROCEDURE — 77067 SCR MAMMO BI INCL CAD: CPT | Mod: 26,,, | Performed by: RADIOLOGY

## 2025-05-20 ENCOUNTER — OFFICE VISIT (OUTPATIENT)
Dept: OBSTETRICS AND GYNECOLOGY | Facility: CLINIC | Age: 65
End: 2025-05-20
Payer: MEDICARE

## 2025-05-20 VITALS
WEIGHT: 165.38 LBS | BODY MASS INDEX: 28.38 KG/M2 | DIASTOLIC BLOOD PRESSURE: 84 MMHG | SYSTOLIC BLOOD PRESSURE: 118 MMHG

## 2025-05-20 DIAGNOSIS — Z78.0 POSTMENOPAUSAL: ICD-10-CM

## 2025-05-20 DIAGNOSIS — Z01.419 ENCOUNTER FOR GYNECOLOGICAL EXAMINATION WITHOUT ABNORMAL FINDING: Primary | ICD-10-CM

## 2025-05-20 PROCEDURE — 99999 PR PBB SHADOW E&M-EST. PATIENT-LVL III: CPT | Mod: PBBFAC,,, | Performed by: OBSTETRICS & GYNECOLOGY

## 2025-05-20 PROCEDURE — 99397 PER PM REEVAL EST PAT 65+ YR: CPT | Mod: S$GLB,,, | Performed by: OBSTETRICS & GYNECOLOGY

## 2025-05-20 PROCEDURE — 99213 OFFICE O/P EST LOW 20 MIN: CPT | Mod: PBBFAC,PN | Performed by: OBSTETRICS & GYNECOLOGY

## 2025-05-20 NOTE — PROGRESS NOTES
CC: Well woman exam    Marilee Mckee is a 65 y.o. female  presents for a well woman exam.  LMP: Patient's last menstrual period was 2012..      Past Medical History:   Diagnosis Date    Abnormal Pap smear     Asthma     Cataract     DJD (degenerative joint disease) of knee     Fibrocystic breast     GERD (gastroesophageal reflux disease)     Stroke     some memory loss and weakness in right arm     Past Surgical History:   Procedure Laterality Date    BREAST BIOPSY Right     benign    BREAST BIOPSY Left     benign    BREAST SURGERY   and     right side and left side/begin fibroid tumor    CATARACT EXTRACTION W/  INTRAOCULAR LENS IMPLANT Right 2024    Procedure: EXTRACTION, CATARACT, WITH IOL INSERTION;  Surgeon: Gisel Shook MD;  Location: [facility] OR;  Service: Ophthalmology;  Laterality: Right;    CATARACT EXTRACTION W/  INTRAOCULAR LENS IMPLANT Left 2024    Procedure: EXTRACTION, CATARACT, WITH IOL INSERTION;  Surgeon: Gisel Shook MD;  Location: [facility] OR;  Service: Ophthalmology;  Laterality: Left;    CERVIX SURGERY      COLONOSCOPY N/A 2023    Procedure: COLONOSCOPY;  Surgeon: Niles Ruiz MD;  Location: [facility] ENDO ([facility] FLR);  Service: Endoscopy;  Laterality: N/A;  sutab, instructions portal- LW  23-precall completed. pt requesting to reschedule,  notified-    23 pre call complete -[facility]    colpo 2002      radial plate       skin cancers      TONSILLECTOMY      childhood    TONSILLECTOMY, ADENOIDECTOMY      childhood     Social History     Socioeconomic History    Marital status:    Occupational History    Occupation:    Tobacco Use    Smoking status: Former    Smokeless tobacco: Never    Tobacco comments:     quit >34 years ago   Substance and Sexual Activity    Alcohol use: Yes     Alcohol/week: 10.0 standard drinks of alcohol     Types: 10 Glasses of wine per week     Comment: has at least one drink  daily/10-12 per week    Drug use: No    Sexual activity: Yes     Partners: Male     Birth control/protection: Post-menopausal   Social History Narrative    Retired from     Now working on novel writing and blogger     Social Drivers of Health     Financial Resource Strain: Low Risk  (7/9/2025)    Overall Financial Resource Strain (CARDIA)     Difficulty of Paying Living Expenses: Not hard at all   Food Insecurity: No Food Insecurity (7/9/2025)    Hunger Vital Sign     Worried About Running Out of Food in the Last Year: Never true     Ran Out of Food in the Last Year: Never true   Transportation Needs: No Transportation Needs (7/9/2025)    PRAPARE - Transportation     Lack of Transportation (Medical): No     Lack of Transportation (Non-Medical): No   Physical Activity: Sufficiently Active (7/9/2025)    Exercise Vital Sign     Days of Exercise per Week: 5 days     Minutes of Exercise per Session: 50 min   Stress: Stress Concern Present (7/9/2025)    [employer] [place] [employer] - Occupational Stress Questionnaire     Feeling of Stress : Rather much   Housing Stability: Low Risk  (7/9/2025)    Housing Stability Vital Sign     Unable to Pay for Housing in the Last Year: No     Number of Times Moved in the Last Year: 0     Homeless in the Last Year: No     Family History   Problem Relation Name Age of Onset    Heart disease Paternal Grandfather      Cancer Paternal Grandfather          bone cancer    Heart disease Paternal Grandmother      Heart disease Maternal Grandmother      Diabetes Maternal Grandmother      Heart disease Maternal Grandfather      Stroke Father      Dementia Father      Stroke Mother      Cataracts Mother      Dementia Mother      Glaucoma Sister      Strabismus Sister      Amblyopia Sister      Diabetes Maternal Aunt      Cancer Maternal Aunt          thyroid cancer    Melanoma Maternal Aunt      Cataracts Maternal Aunt      Heart disease Maternal Uncle      Cataracts  Maternal Uncle      Diabetes Maternal Uncle      Cancer Paternal Aunt          stomach cancer    Diabetes Paternal Aunt      Heart disease Paternal Uncle      Cancer Paternal Uncle          prostate cancer    Diabetes Paternal Uncle      Breast cancer Neg Hx      Colon cancer Neg Hx      Ovarian cancer Neg Hx      Macular degeneration Neg Hx      Retinal detachment Neg Hx      Cervical cancer Neg Hx      Uterine cancer Neg Hx       OB History          1    Para   1    Term   0            AB        Living   1         SAB        IAB        Ectopic        Multiple        Live Births   1                 /84 (Patient Position: Sitting)   Wt 75 kg (165 lb 5.5 oz)   LMP 2012   BMI 28.38 kg/m²       ROS:    ROS:  GENERAL: Denies weight gain or weight loss. Feeling well overall.   SKIN: Denies rash or lesions.   HEAD: Denies head injury or headache.   NODES: Denies enlarged lymph nodes.   CHEST: Denies chest pain or shortness of breath.   CARDIOVASCULAR: Denies palpitations or left sided chest pain.   ABDOMEN: No abdominal pain, constipation, diarrhea, nausea, vomiting or rectal bleeding.   URINARY: No frequency, dysuria, hematuria, or burning on urination.  REPRODUCTIVE: See HPI.   BREASTS: The patient performs breast self-examination and denies pain, lumps, or nipple discharge.   HEMATOLOGIC: No easy bruisability or excessive bleeding.   MUSCULOSKELETAL: Denies joint pain or swelling.   NEUROLOGIC: Denies syncope or weakness.   PSYCHIATRIC: Denies depression, anxiety or mood swings.    PHYSICAL EXAM:    APPEARANCE: Well nourished, well developed, in no acute distress.  AFFECT: WNL, alert and oriented x 3  SKIN: No acne or hirsutism  NECK: Neck symmetric without masses or thyromegaly  NODES: No inguinal, cervical, axillary, or femoral lymph node enlargement  CHEST: Good respiratory effect  ABDOMEN: Soft.  No tenderness or masses.  No hepatosplenomegaly.  No hernias.  BREASTS: Symmetrical, no  skin changes or visible lesions.  No palpable masses, nipple discharge bilaterally.  PELVIC: Normal external genitalia without lesions.  Normal hair distribution.  Adequate perineal body, normal urethral meatus.  Vagina moist and well rugated without lesions or discharge.  Cervix pink, without lesions, discharge or tenderness.  No significant cystocele or rectocele.  Bimanual exam shows uterus to be normal size, regular, mobile and nontender.  Adnexa without masses or tenderness.    RECTAL: Rectovaginal exam confirms above with normal sphincter tone, no masses.  EXTREMITIES: No edema.    Diagnosis      ICD-10-CM ICD-9-CM    1. Encounter for gynecological examination without abnormal finding  Z01.419 V72.31       2. Postmenopausal  Z78.0 V49.81         A full discussion of the benefit-risk ratio of hormonal replacement therapy was carried out. Improvement in vasomotor and other climacteric symptoms is discussed, including possible improvements in sleep and mood. Reduction of risk for osteoporosis was explained. We discussed the study data showing increased risk of thrombo-embolic events such as myocardial infarction, stroke and also possibly breast cancer with estrogen replacement, and how this might affect her. The range of side effects such as breast tenderness, weight gain and including possible increases in lifetime risk of breast cancer and possible thrombotic complications was discussed. We also discussed ACOG's recommendation to use hormone replacement therapy for the relief of hot flashes alone and to be on the lowest dose possible for the shortest amount of time.  Alternative such as herbal and soy-based products were reviewed. All of her questions about this therapy were answered.        Patient was counseled today on A.C.S. Pap guidelines and recommendations for yearly pelvic exams, mammograms and monthly self breast exams; to see her PCP for other health maintenance.     Follow up in about 1 year (around  5/20/2026), or if symptoms worsen or fail to improve.

## 2025-07-06 ENCOUNTER — PATIENT MESSAGE (OUTPATIENT)
Dept: INTERNAL MEDICINE | Facility: CLINIC | Age: 65
End: 2025-07-06
Payer: MEDICARE

## 2025-07-06 DIAGNOSIS — Z00.00 ANNUAL PHYSICAL EXAM: ICD-10-CM

## 2025-07-07 NOTE — TELEPHONE ENCOUNTER
Labs last drawn 6/26/2024, pt has appt scheduled on 7/8 for annual exam; seeking orders for labs to be drawn prior to visit    Pended reorder of previous labs in encounter for review

## 2025-07-08 ENCOUNTER — OFFICE VISIT (OUTPATIENT)
Dept: INTERNAL MEDICINE | Facility: CLINIC | Age: 65
End: 2025-07-08
Payer: MEDICARE

## 2025-07-08 VITALS
HEART RATE: 95 BPM | DIASTOLIC BLOOD PRESSURE: 70 MMHG | WEIGHT: 168.31 LBS | SYSTOLIC BLOOD PRESSURE: 110 MMHG | RESPIRATION RATE: 18 BRPM | HEIGHT: 64 IN | TEMPERATURE: 97 F | BODY MASS INDEX: 28.73 KG/M2 | OXYGEN SATURATION: 95 %

## 2025-07-08 DIAGNOSIS — E04.2 MULTIPLE THYROID NODULES: ICD-10-CM

## 2025-07-08 DIAGNOSIS — E78.5 DYSLIPIDEMIA: ICD-10-CM

## 2025-07-08 DIAGNOSIS — Z00.00 ANNUAL PHYSICAL EXAM: Primary | ICD-10-CM

## 2025-07-08 DIAGNOSIS — Z78.0 ASYMPTOMATIC MENOPAUSAL STATE: ICD-10-CM

## 2025-07-08 DIAGNOSIS — M85.80 OSTEOPENIA, UNSPECIFIED LOCATION: ICD-10-CM

## 2025-07-08 PROBLEM — R05.1 ACUTE COUGH: Status: RESOLVED | Noted: 2025-02-24 | Resolved: 2025-07-08

## 2025-07-08 PROBLEM — R50.9 FEVER: Status: RESOLVED | Noted: 2025-02-24 | Resolved: 2025-07-08

## 2025-07-08 PROBLEM — J06.9 UPPER RESPIRATORY TRACT INFECTION: Status: RESOLVED | Noted: 2025-02-24 | Resolved: 2025-07-08

## 2025-07-08 PROBLEM — R09.82 POST-NASAL DRIP: Status: RESOLVED | Noted: 2025-02-24 | Resolved: 2025-07-08

## 2025-07-08 PROCEDURE — 1159F MED LIST DOCD IN RCRD: CPT | Mod: CPTII,S$GLB,, | Performed by: INTERNAL MEDICINE

## 2025-07-08 PROCEDURE — 99999 PR PBB SHADOW E&M-EST. PATIENT-LVL IV: CPT | Mod: PBBFAC,,, | Performed by: INTERNAL MEDICINE

## 2025-07-08 PROCEDURE — 3078F DIAST BP <80 MM HG: CPT | Mod: CPTII,S$GLB,, | Performed by: INTERNAL MEDICINE

## 2025-07-08 PROCEDURE — 3008F BODY MASS INDEX DOCD: CPT | Mod: CPTII,S$GLB,, | Performed by: INTERNAL MEDICINE

## 2025-07-08 PROCEDURE — 1101F PT FALLS ASSESS-DOCD LE1/YR: CPT | Mod: CPTII,S$GLB,, | Performed by: INTERNAL MEDICINE

## 2025-07-08 PROCEDURE — 3288F FALL RISK ASSESSMENT DOCD: CPT | Mod: CPTII,S$GLB,, | Performed by: INTERNAL MEDICINE

## 2025-07-08 PROCEDURE — 3074F SYST BP LT 130 MM HG: CPT | Mod: CPTII,S$GLB,, | Performed by: INTERNAL MEDICINE

## 2025-07-08 PROCEDURE — 99397 PER PM REEVAL EST PAT 65+ YR: CPT | Mod: S$GLB,,, | Performed by: INTERNAL MEDICINE

## 2025-07-08 NOTE — PROGRESS NOTES
Subjective     Patient ID: Marilee Mckee is a 65 y.o. female.    Chief Complaint: Annual Exam    HPI  65 y.o. Female here for annual exam.      Vaccines: Influenza (2023); Tetanus (2019); PNA (current); Shingrix (2021)  Eye exam: current  Mammogram: 4/25  Gyn exam: 5/25  Colonoscopy: 9/23  DEXA: 5/23     Exercise: walks  Diet: regular     Past Medical History:  No date: Abnormal Pap smear  No date: Asthma  No date: Cataract  No date: DJD (degenerative joint disease) of knee  No date: Fibrocystic breast  No date: GERD (gastroesophageal reflux disease)  2015: Stroke      Comment:  some memory loss and weakness in right arm  Past Surgical History:  1990: BREAST BIOPSY; Right      Comment:  benign  2001: BREAST BIOPSY; Left      Comment:  benign  1990 and 2001: BREAST SURGERY      Comment:  right side and left side/begin fibroid tumor  2/1/2024: CATARACT EXTRACTION W/  INTRAOCULAR LENS IMPLANT; Right      Comment:  Procedure: EXTRACTION, CATARACT, WITH IOL INSERTION;                 Surgeon: Gisel Shook MD;  [facility]                 Service: Ophthalmology;  Laterality: Right;  2/22/2024: CATARACT EXTRACTION W/  INTRAOCULAR LENS IMPLANT; Left      Comment:  Procedure: EXTRACTION, CATARACT, WITH IOL INSERTION;                 Surgeon: Gisel Shook MD;  [facility];                 Service: Ophthalmology;  Laterality: Left;  No date: CERVIX SURGERY  9/12/2023: COLONOSCOPY; N/A      Comment:  Procedure: COLONOSCOPY;  Surgeon: Niles Ruiz MD;  Location: [address]);  Service: Endoscopy;                Laterality: N/A;  sutab, instructions portal-                2/1/23-precall completed. pt requesting to                reschedule,  notified-9/5/23 pre call                complete -[facility]  No date: colpo 2002  No date: radial plate   No date: skin cancers  No date: TONSILLECTOMY      Comment:  childhood  No date: TONSILLECTOMY, ADENOIDECTOMY      Comment:   childhood  Social History    Socioeconomic History      Marital status:     Occupational History      Occupation:     Tobacco Use      Smoking status: Former      Smokeless tobacco: Never      Tobacco comments: quit >34 years ago    Substance and Sexual Activity      Alcohol use: Yes        Alcohol/week: 10.0 standard drinks of alcohol        Types: 10 Glasses of wine per week        Comment: has at least one drink daily/10-12 per week      Drug use: No      Sexual activity: Yes        Partners: Male        Birth control/protection: Post-menopausal    Social History Narrative      Retired from       Now working on novel writing and blogger    Social Drivers of Health  Financial Resource Strain: Low Risk  (6/24/2024)      Overall Financial Resource Strain (CARDIA)          Difficulty of Paying Living Expenses: Not hard at all  Food Insecurity: No Food Insecurity (6/24/2024)      Hunger Vital Sign          Worried About Running Out of Food in the Last Year: Never true          Ran Out of Food in the Last Year: Never true  Transportation Needs: No Transportation Needs (3/2/2024)      PRAPARE - Transportation          Lack of Transportation (Medical): No          Lack of Transportation (Non-Medical): No  Physical Activity: Sufficiently Active (6/24/2024)      Exercise Vital Sign          Days of Exercise per Week: 5 days          Minutes of Exercise per Session: 50 min  Stress: Stress Concern Present (6/24/2024)      [employer] [place] [employer] - Occupational Stress Questionnaire          Feeling of Stress : To some extent  Housing Stability: Low Risk  (3/2/2024)      Housing Stability Vital Sign          Unable to Pay for Housing in the Last Year: No          Number of Places Lived in the Last Year: 1          Unstable Housing in the Last Year: No  Review of patient's allergies indicates:   -- Tetracyclines -- Dermatitis   -- Cefaclor -- Other (See Comments)     --  Canker sores   -- Cephalexin -- Other (See Comments)    --  Canker sores   -- Cat dander -- Other (See Comments)   -- Coconut -- Hives and Other (See Comments)   -- Shrimp -- Hives and Other (See Comments)   -- Tetracycline -- Other (See Comments)   -- Tree and shrub pollen -- Other (See Comments)  Marilee Mckee had no medications administered during this visit.  Review of Systems   Constitutional:  Negative for activity change, appetite change, chills, diaphoresis, fatigue, fever and unexpected weight change.   HENT:  Negative for nasal congestion, mouth sores, postnasal drip, rhinorrhea, sinus pressure/congestion, sneezing, sore throat, trouble swallowing and voice change.    Eyes:  Negative for pain, discharge and visual disturbance.   Respiratory:  Negative for cough, shortness of breath and wheezing.    Cardiovascular:  Negative for chest pain, palpitations and leg swelling.   Gastrointestinal:  Negative for abdominal pain, blood in stool, constipation, diarrhea, nausea and vomiting.   Endocrine: Negative for cold intolerance and heat intolerance.   Genitourinary:  Negative for difficulty urinating, dysuria, frequency, hematuria and urgency.   Musculoskeletal:  Negative for arthralgias and myalgias.   Integumentary:  Negative for rash and wound.   Allergic/Immunologic: Negative for environmental allergies and food allergies.   Neurological:  Negative for dizziness, tremors, seizures, syncope, weakness, light-headedness and headaches.   Hematological:  Negative for adenopathy. Does not bruise/bleed easily.   Psychiatric/Behavioral:  Negative for confusion and sleep disturbance. The patient is not nervous/anxious.           Objective     Physical Exam  Vitals and nursing note reviewed.   Constitutional:       General: She is not in acute distress.     Appearance: Normal appearance. She is well-developed. She is not diaphoretic.   HENT:      Head: Normocephalic and atraumatic.      Right Ear: External  ear normal.      Left Ear: External ear normal.      Nose: Nose normal.      Mouth/Throat:      Pharynx: No oropharyngeal exudate.   Eyes:      General: No scleral icterus.        Right eye: No discharge.         Left eye: No discharge.      Conjunctiva/sclera: Conjunctivae normal.      Pupils: Pupils are equal, round, and reactive to light.   Neck:      Thyroid: No thyromegaly.      Vascular: No JVD.   Cardiovascular:      Rate and Rhythm: Normal rate and regular rhythm.      Pulses: Normal pulses.      Heart sounds: Normal heart sounds. No murmur heard.  Pulmonary:      Effort: Pulmonary effort is normal. No respiratory distress.      Breath sounds: Normal breath sounds. No wheezing, rhonchi or rales.   Chest:      Chest wall: No tenderness.   Abdominal:      General: Bowel sounds are normal. There is no distension.      Palpations: Abdomen is soft.      Tenderness: There is no abdominal tenderness. There is no guarding or rebound.   Musculoskeletal:      Cervical back: Neck supple.      Right lower leg: No edema.      Left lower leg: No edema.   Lymphadenopathy:      Cervical: No cervical adenopathy.   Skin:     General: Skin is warm and dry.      Coloration: Skin is not pale.      Findings: No rash.   Neurological:      General: No focal deficit present.      Mental Status: She is alert and oriented to person, place, and time.      Gait: Gait normal.   Psychiatric:         Behavior: Behavior normal.         Thought Content: Thought content normal.         Judgment: Judgment normal.            Assessment and Plan     1. Annual physical exam    2. Dyslipidemia    3. Multiple thyroid nodules    4. Osteopenia, unspecified location  -     DXA Bone Density Axial Skeleton 1 or more sites; Future; Expected date: 07/08/2025    5. Asymptomatic menopausal state  -     DXA Bone Density Axial Skeleton 1 or more sites; Future; Expected date: 07/08/2025        Blood work ordered      HLD- stable on statin     Thyroid nodules-  hx of neg Bx   -last US(7/24)    Osteopenia- stable   -repeat DEXA     F/u in 1 yr

## 2025-07-10 ENCOUNTER — APPOINTMENT (OUTPATIENT)
Dept: RADIOLOGY | Facility: CLINIC | Age: 65
End: 2025-07-10
Attending: INTERNAL MEDICINE
Payer: MEDICARE

## 2025-07-10 DIAGNOSIS — Z78.0 ASYMPTOMATIC MENOPAUSAL STATE: ICD-10-CM

## 2025-07-10 DIAGNOSIS — M85.80 OSTEOPENIA, UNSPECIFIED LOCATION: ICD-10-CM

## 2025-07-10 PROCEDURE — 77080 DXA BONE DENSITY AXIAL: CPT | Mod: TC,FY,PO

## 2025-07-10 PROCEDURE — 77080 DXA BONE DENSITY AXIAL: CPT | Mod: 26,,, | Performed by: INTERNAL MEDICINE

## (undated) DEVICE — SEE MEDLINE ITEM 157131

## (undated) DEVICE — BIT DRILL 2.8MM DIA

## (undated) DEVICE — GLOVE SURG BIOGEL LATEX SZ 7.5

## (undated) DEVICE — GLASSES EYE PROTECTIVE

## (undated) DEVICE — SLING ARM MEDIUM

## (undated) DEVICE — SOL 9P NACL IRR PIC IL

## (undated) DEVICE — BLADE SURG STAINLESS STEEL #15

## (undated) DEVICE — GLOVE SENSICARE PI SURG 7.5

## (undated) DEVICE — BIT DRILL QUICK RELEASE 2.8MM

## (undated) DEVICE — NDL HYPO REG 25G X 1 1/2

## (undated) DEVICE — SUT VICRYL 3-0 27 SH

## (undated) DEVICE — CAST PLSTR SPLINT X-FAST 3X15

## (undated) DEVICE — Device

## (undated) DEVICE — ADHESIVE MASTISOL VIAL 48/BX

## (undated) DEVICE — SOL BETADINE 5%

## (undated) DEVICE — BUCKET PLASTER DISPOSABLE

## (undated) DEVICE — SUT ETHILON 5-0 PS-2 18IN

## (undated) DEVICE — PACK UPPER EXTREMITY BAPTIST

## (undated) DEVICE — DRAPE C-ARM MINI DISP

## (undated) DEVICE — SOL POVIDONE SCRUB IODINE 4 OZ

## (undated) DEVICE — DRESSING XEROFORM FOIL PK 1X8

## (undated) DEVICE — TOURNIQUET SB QC SP 18X4IN

## (undated) DEVICE — CRADLE POSITIONER FOAM DISP

## (undated) DEVICE — BIT DRILL QUICK RELEASE 2.0MM

## (undated) DEVICE — PAD UNDERPAD 30X30

## (undated) DEVICE — PADDING CAST 2IN DELTA ROLL

## (undated) DEVICE — SEE MEDLINE ITEM 152487

## (undated) DEVICE — PAD CAST 2 IN X 4YDS STERILE

## (undated) DEVICE — SCREW BONE 2.3 X 22MM: Type: IMPLANTABLE DEVICE | Site: WRIST | Status: NON-FUNCTIONAL

## (undated) DEVICE — SUT MCRYL PLUS 4-0 PS2 27IN

## (undated) DEVICE — SCREW BONE LOK CONG 2.3X22MM: Type: IMPLANTABLE DEVICE | Site: WRIST | Status: NON-FUNCTIONAL

## (undated) DEVICE — GAUZE SPONGE 4X4 12PLY

## (undated) DEVICE — BANDAGE ACE NON LATEX 3IN

## (undated) DEVICE — BANDAGE ACE NON LATEX 2IN

## (undated) DEVICE — BANDAGE ELASTIC 2X5 VELCRO ST

## (undated) DEVICE — BLADE SCALP OPHTL BEVEL STR

## (undated) DEVICE — APPLICATOR CHLORAPREP ORN 26ML

## (undated) DEVICE — BANDAGE ELASTIC 3X5 VELCRO ST

## (undated) DEVICE — PADDING CAST SPECIALIST 3X4YD

## (undated) DEVICE — SPLINT CAST ROLL 3IN X 15FT

## (undated) DEVICE — SUT FIBERWIRE 2-0 18

## (undated) DEVICE — CLOSURE SKIN STERI STRIP 1/2X4

## (undated) DEVICE — SYR B-D DISP CONTROL 10CC100/C

## (undated) DEVICE — SEE MEDLINE ITEM 146347